# Patient Record
Sex: MALE | Race: WHITE | Employment: OTHER | URBAN - NONMETROPOLITAN AREA
[De-identification: names, ages, dates, MRNs, and addresses within clinical notes are randomized per-mention and may not be internally consistent; named-entity substitution may affect disease eponyms.]

---

## 2017-02-28 ENCOUNTER — OFFICE VISIT (OUTPATIENT)
Dept: CARDIOLOGY | Age: 72
End: 2017-02-28
Payer: MEDICARE

## 2017-02-28 VITALS
DIASTOLIC BLOOD PRESSURE: 74 MMHG | SYSTOLIC BLOOD PRESSURE: 134 MMHG | HEIGHT: 73 IN | HEART RATE: 68 BPM | BODY MASS INDEX: 31.14 KG/M2 | WEIGHT: 235 LBS

## 2017-02-28 DIAGNOSIS — I48.0 PAF (PAROXYSMAL ATRIAL FIBRILLATION) (HCC): ICD-10-CM

## 2017-02-28 DIAGNOSIS — R55 NEAR SYNCOPE: ICD-10-CM

## 2017-02-28 DIAGNOSIS — R07.9 CHEST PAIN, UNSPECIFIED TYPE: ICD-10-CM

## 2017-02-28 DIAGNOSIS — I49.5 SICK SINUS SYNDROME (HCC): ICD-10-CM

## 2017-02-28 DIAGNOSIS — I10 ESSENTIAL HYPERTENSION: Primary | ICD-10-CM

## 2017-02-28 DIAGNOSIS — Z95.0 PACEMAKER: ICD-10-CM

## 2017-02-28 PROCEDURE — G8419 CALC BMI OUT NRM PARAM NOF/U: HCPCS | Performed by: INTERNAL MEDICINE

## 2017-02-28 PROCEDURE — 99212 OFFICE O/P EST SF 10 MIN: CPT | Performed by: INTERNAL MEDICINE

## 2017-02-28 PROCEDURE — 1036F TOBACCO NON-USER: CPT | Performed by: INTERNAL MEDICINE

## 2017-02-28 PROCEDURE — 93280 PM DEVICE PROGR EVAL DUAL: CPT | Performed by: INTERNAL MEDICINE

## 2017-02-28 PROCEDURE — 3017F COLORECTAL CA SCREEN DOC REV: CPT | Performed by: INTERNAL MEDICINE

## 2017-02-28 PROCEDURE — G8598 ASA/ANTIPLAT THER USED: HCPCS | Performed by: INTERNAL MEDICINE

## 2017-02-28 PROCEDURE — 1123F ACP DISCUSS/DSCN MKR DOCD: CPT | Performed by: INTERNAL MEDICINE

## 2017-02-28 PROCEDURE — G8427 DOCREV CUR MEDS BY ELIG CLIN: HCPCS | Performed by: INTERNAL MEDICINE

## 2017-02-28 PROCEDURE — 4040F PNEUMOC VAC/ADMIN/RCVD: CPT | Performed by: INTERNAL MEDICINE

## 2017-02-28 PROCEDURE — G8484 FLU IMMUNIZE NO ADMIN: HCPCS | Performed by: INTERNAL MEDICINE

## 2017-03-07 ENCOUNTER — HOSPITAL ENCOUNTER (OUTPATIENT)
Dept: NON INVASIVE DIAGNOSTICS | Age: 72
Discharge: HOME | End: 2017-03-07
Payer: MEDICARE

## 2017-03-07 ENCOUNTER — HOSPITAL ENCOUNTER (OUTPATIENT)
Dept: NUCLEAR MEDICINE | Age: 72
Discharge: HOME | End: 2017-03-07
Payer: MEDICARE

## 2017-03-07 DIAGNOSIS — R07.9 CHEST PAIN, UNSPECIFIED TYPE: ICD-10-CM

## 2017-03-07 PROCEDURE — 6360000002 HC RX W HCPCS: Performed by: INTERNAL MEDICINE

## 2017-03-07 PROCEDURE — 93017 CV STRESS TEST TRACING ONLY: CPT

## 2017-03-07 PROCEDURE — 3430000000 HC RX DIAGNOSTIC RADIOPHARMACEUTICAL: Performed by: INTERNAL MEDICINE

## 2017-03-07 PROCEDURE — 78452 HT MUSCLE IMAGE SPECT MULT: CPT

## 2017-03-07 PROCEDURE — A9500 TC99M SESTAMIBI: HCPCS | Performed by: INTERNAL MEDICINE

## 2017-03-07 RX ADMIN — TETRAKIS(2-METHOXYISOBUTYLISOCYANIDE)COPPER(I) TETRAFLUOROBORATE 10 MILLICURIE: 1 INJECTION, POWDER, LYOPHILIZED, FOR SOLUTION INTRAVENOUS at 10:00

## 2017-03-07 RX ADMIN — TETRAKIS(2-METHOXYISOBUTYLISOCYANIDE)COPPER(I) TETRAFLUOROBORATE 30 MILLICURIE: 1 INJECTION, POWDER, LYOPHILIZED, FOR SOLUTION INTRAVENOUS at 10:00

## 2017-03-07 RX ADMIN — REGADENOSON 0.4 MG: 0.08 INJECTION, SOLUTION INTRAVENOUS at 10:00

## 2017-03-21 ENCOUNTER — OFFICE VISIT (OUTPATIENT)
Dept: CARDIOLOGY | Age: 72
End: 2017-03-21
Payer: MEDICARE

## 2017-03-21 VITALS
DIASTOLIC BLOOD PRESSURE: 88 MMHG | HEART RATE: 74 BPM | BODY MASS INDEX: 30.16 KG/M2 | HEIGHT: 74 IN | WEIGHT: 235 LBS | SYSTOLIC BLOOD PRESSURE: 138 MMHG

## 2017-03-21 DIAGNOSIS — I10 ESSENTIAL HYPERTENSION: Primary | ICD-10-CM

## 2017-03-21 DIAGNOSIS — R55 NEAR SYNCOPE: ICD-10-CM

## 2017-03-21 DIAGNOSIS — I48.0 PAF (PAROXYSMAL ATRIAL FIBRILLATION) (HCC): ICD-10-CM

## 2017-03-21 DIAGNOSIS — I49.5 SICK SINUS SYNDROME (HCC): ICD-10-CM

## 2017-03-21 PROCEDURE — G8484 FLU IMMUNIZE NO ADMIN: HCPCS | Performed by: INTERNAL MEDICINE

## 2017-03-21 PROCEDURE — 4040F PNEUMOC VAC/ADMIN/RCVD: CPT | Performed by: INTERNAL MEDICINE

## 2017-03-21 PROCEDURE — G8417 CALC BMI ABV UP PARAM F/U: HCPCS | Performed by: INTERNAL MEDICINE

## 2017-03-21 PROCEDURE — G8427 DOCREV CUR MEDS BY ELIG CLIN: HCPCS | Performed by: INTERNAL MEDICINE

## 2017-03-21 PROCEDURE — G8598 ASA/ANTIPLAT THER USED: HCPCS | Performed by: INTERNAL MEDICINE

## 2017-03-21 PROCEDURE — 3017F COLORECTAL CA SCREEN DOC REV: CPT | Performed by: INTERNAL MEDICINE

## 2017-03-21 PROCEDURE — 1123F ACP DISCUSS/DSCN MKR DOCD: CPT | Performed by: INTERNAL MEDICINE

## 2017-03-21 PROCEDURE — 99212 OFFICE O/P EST SF 10 MIN: CPT | Performed by: INTERNAL MEDICINE

## 2017-03-21 PROCEDURE — 1036F TOBACCO NON-USER: CPT | Performed by: INTERNAL MEDICINE

## 2017-05-31 ENCOUNTER — TELEPHONE (OUTPATIENT)
Dept: CARDIOLOGY | Age: 72
End: 2017-05-31

## 2017-05-31 PROCEDURE — 93294 REM INTERROG EVL PM/LDLS PM: CPT | Performed by: CLINICAL NURSE SPECIALIST

## 2017-05-31 PROCEDURE — 93296 REM INTERROG EVL PM/IDS: CPT | Performed by: CLINICAL NURSE SPECIALIST

## 2017-06-01 DIAGNOSIS — I49.5 SICK SINUS SYNDROME (HCC): ICD-10-CM

## 2017-06-01 DIAGNOSIS — Z95.0 PACEMAKER: Primary | ICD-10-CM

## 2017-07-10 DIAGNOSIS — Z95.0 PACEMAKER: ICD-10-CM

## 2017-07-10 DIAGNOSIS — I10 ESSENTIAL HYPERTENSION: ICD-10-CM

## 2017-07-10 DIAGNOSIS — I48.0 PAF (PAROXYSMAL ATRIAL FIBRILLATION) (HCC): ICD-10-CM

## 2017-07-10 RX ORDER — PROPAFENONE HYDROCHLORIDE 150 MG/1
TABLET, FILM COATED ORAL
Qty: 270 TABLET | Refills: 3 | Status: SHIPPED | OUTPATIENT
Start: 2017-07-10 | End: 2018-07-09 | Stop reason: SDUPTHER

## 2017-07-10 RX ORDER — LOSARTAN POTASSIUM 100 MG/1
TABLET ORAL
Qty: 90 TABLET | Refills: 3 | Status: SHIPPED | OUTPATIENT
Start: 2017-07-10 | End: 2018-10-23 | Stop reason: DRUGHIGH

## 2017-09-21 ENCOUNTER — OFFICE VISIT (OUTPATIENT)
Dept: CARDIOLOGY | Age: 72
End: 2017-09-21
Payer: MEDICARE

## 2017-09-21 VITALS
SYSTOLIC BLOOD PRESSURE: 136 MMHG | WEIGHT: 230 LBS | DIASTOLIC BLOOD PRESSURE: 78 MMHG | HEIGHT: 74 IN | HEART RATE: 68 BPM | BODY MASS INDEX: 29.52 KG/M2

## 2017-09-21 DIAGNOSIS — I25.10 CORONARY ARTERY DISEASE INVOLVING NATIVE CORONARY ARTERY OF NATIVE HEART WITHOUT ANGINA PECTORIS: Primary | ICD-10-CM

## 2017-09-21 DIAGNOSIS — I49.5 SICK SINUS SYNDROME (HCC): ICD-10-CM

## 2017-09-21 DIAGNOSIS — Z95.0 PACEMAKER: ICD-10-CM

## 2017-09-21 PROCEDURE — G8427 DOCREV CUR MEDS BY ELIG CLIN: HCPCS | Performed by: CLINICAL NURSE SPECIALIST

## 2017-09-21 PROCEDURE — G8417 CALC BMI ABV UP PARAM F/U: HCPCS | Performed by: CLINICAL NURSE SPECIALIST

## 2017-09-21 PROCEDURE — 1036F TOBACCO NON-USER: CPT | Performed by: CLINICAL NURSE SPECIALIST

## 2017-09-21 PROCEDURE — G8598 ASA/ANTIPLAT THER USED: HCPCS | Performed by: CLINICAL NURSE SPECIALIST

## 2017-09-21 PROCEDURE — 93280 PM DEVICE PROGR EVAL DUAL: CPT | Performed by: CLINICAL NURSE SPECIALIST

## 2017-09-21 PROCEDURE — 3017F COLORECTAL CA SCREEN DOC REV: CPT | Performed by: CLINICAL NURSE SPECIALIST

## 2017-09-21 PROCEDURE — 1123F ACP DISCUSS/DSCN MKR DOCD: CPT | Performed by: CLINICAL NURSE SPECIALIST

## 2017-09-21 PROCEDURE — 4040F PNEUMOC VAC/ADMIN/RCVD: CPT | Performed by: CLINICAL NURSE SPECIALIST

## 2017-09-21 PROCEDURE — 99213 OFFICE O/P EST LOW 20 MIN: CPT | Performed by: CLINICAL NURSE SPECIALIST

## 2017-12-21 ENCOUNTER — TELEPHONE (OUTPATIENT)
Dept: CARDIOLOGY | Age: 72
End: 2017-12-21

## 2017-12-22 DIAGNOSIS — Z95.0 PACEMAKER: Primary | ICD-10-CM

## 2017-12-22 DIAGNOSIS — I49.5 SICK SINUS SYNDROME (HCC): ICD-10-CM

## 2017-12-22 PROCEDURE — 93294 REM INTERROG EVL PM/LDLS PM: CPT | Performed by: INTERNAL MEDICINE

## 2017-12-22 PROCEDURE — 93296 REM INTERROG EVL PM/IDS: CPT | Performed by: INTERNAL MEDICINE

## 2018-03-29 ENCOUNTER — OFFICE VISIT (OUTPATIENT)
Dept: CARDIOLOGY | Age: 73
End: 2018-03-29
Payer: MEDICARE

## 2018-03-29 VITALS
HEIGHT: 74 IN | WEIGHT: 243 LBS | HEART RATE: 82 BPM | BODY MASS INDEX: 31.18 KG/M2 | DIASTOLIC BLOOD PRESSURE: 70 MMHG | SYSTOLIC BLOOD PRESSURE: 124 MMHG

## 2018-03-29 DIAGNOSIS — I49.5 SICK SINUS SYNDROME (HCC): ICD-10-CM

## 2018-03-29 DIAGNOSIS — Z95.0 PACEMAKER: ICD-10-CM

## 2018-03-29 DIAGNOSIS — I48.0 PAF (PAROXYSMAL ATRIAL FIBRILLATION) (HCC): Primary | ICD-10-CM

## 2018-03-29 DIAGNOSIS — I25.10 CORONARY ARTERY DISEASE INVOLVING NATIVE CORONARY ARTERY OF NATIVE HEART WITHOUT ANGINA PECTORIS: ICD-10-CM

## 2018-03-29 PROCEDURE — G8598 ASA/ANTIPLAT THER USED: HCPCS | Performed by: CLINICAL NURSE SPECIALIST

## 2018-03-29 PROCEDURE — G8417 CALC BMI ABV UP PARAM F/U: HCPCS | Performed by: CLINICAL NURSE SPECIALIST

## 2018-03-29 PROCEDURE — 93280 PM DEVICE PROGR EVAL DUAL: CPT | Performed by: CLINICAL NURSE SPECIALIST

## 2018-03-29 PROCEDURE — 3017F COLORECTAL CA SCREEN DOC REV: CPT | Performed by: CLINICAL NURSE SPECIALIST

## 2018-03-29 PROCEDURE — 1036F TOBACCO NON-USER: CPT | Performed by: CLINICAL NURSE SPECIALIST

## 2018-03-29 PROCEDURE — G8484 FLU IMMUNIZE NO ADMIN: HCPCS | Performed by: CLINICAL NURSE SPECIALIST

## 2018-03-29 PROCEDURE — G8427 DOCREV CUR MEDS BY ELIG CLIN: HCPCS | Performed by: CLINICAL NURSE SPECIALIST

## 2018-03-29 PROCEDURE — 99213 OFFICE O/P EST LOW 20 MIN: CPT | Performed by: CLINICAL NURSE SPECIALIST

## 2018-03-29 PROCEDURE — 1123F ACP DISCUSS/DSCN MKR DOCD: CPT | Performed by: CLINICAL NURSE SPECIALIST

## 2018-03-29 PROCEDURE — 4040F PNEUMOC VAC/ADMIN/RCVD: CPT | Performed by: CLINICAL NURSE SPECIALIST

## 2018-03-29 NOTE — PROGRESS NOTES
[facility] [address]  Phone: [phone]  Fax: [phone]    OFFICE VISIT:  3/29/2018    Sandra Clemons - : 1945    Reason For Visit:  Kimmy Regan is a 67 y.o. male who is here for 1 Year Follow Up (no cardiac symptoms) and Coronary Artery Disease  Casandra scan negative for ischemia 2017    Subjective  Kimmy Regan states he occasionally will have some chest tightness when he 1st begins walking. This is relieved after a few minutes. He denies exertional chest pain, shortness of breath, orthopnea, paroxysmal nocturnal dyspnea, syncope, presyncope, arrhythmia, edema and fatigue. The patient denies numbness or weakness to suggest cerebrovascular accident or transient ischemic attack. Joya Pemberton MD is PCP and follows labs including lipids.   Sandra Clemons has the following history as recorded in [facility]:    Patient Active Problem List    Diagnosis Date Noted    History of colon polyps 03/10/2016    Screening for colon cancer 03/10/2016    Heme positive stool 2014    Rectal bleeding 2014    CAD (coronary artery disease) 2012    PAF (paroxysmal atrial fibrillation) ([address] 10/30/2012    Hypertension     Pacemaker 2012    Sick sinus syndrome ([address] 2012    Near syncope 06/10/2012     Past Medical History:   Diagnosis Date    CAD (coronary artery disease) 2012    Hyperlipidemia     Hypertension     Near syncope 6/10/2012    Pacemaker 12    PAF (paroxysmal atrial fibrillation) ([address] [address] 10/30/2012     Past Surgical History:   Procedure Laterality Date    APPENDECTOMY      CARDIAC CATHETERIZATION  12   JDT    EF  60%  with angioplasty    CHOLECYSTECTOMY, LAPAROSCOPIC  11/2/15    COLONOSCOPY  2015    Nancy: mult polyps, 5 were tubular adenomas    COLONOSCOPY N/A 3/10/2016    Dr Don Rivas AP x 3, dysplasia (-), HP x 1, 3 yr recall    FRACTURE SURGERY      Nose    PACEMAKER PLACEMENT       Family History   Problem Relation Age of Onset    Heart Failure Mother       at age 80    Heart Disease Maternal Uncle     Colon Cancer Neg Hx     Colon Polyps Neg Hx     Esophageal Cancer Neg Hx     Liver Disease Neg Hx     Liver Cancer Neg Hx     Rectal Cancer Neg Hx     Stomach Cancer Neg Hx      Social History   Substance Use Topics    Smoking status: Former Smoker     Packs/day: 0.00     Types: Cigarettes    Smokeless tobacco: Never Used    Alcohol use No      Current Outpatient Prescriptions   Medication Sig Dispense Refill    losartan (COZAAR) 100 MG tablet Take 1 tablet by mouth  daily 90 tablet 3    propafenone (RYTHMOL) 150 MG tablet Take 1 tablet by mouth  every 8 hours 270 tablet 3    metoprolol tartrate (LOPRESSOR) 25 MG tablet Take one-half tablet by  mouth twice a day 90 tablet 3    pantoprazole (PROTONIX) 40 MG tablet Take 40 mg by mouth daily       aspirin 81 MG tablet Take 81 mg by mouth daily. No current facility-administered medications for this visit. Allergies: Lisinopril    Review of Systems  Constitutional  no significant activity change, appetite change, or unexpected weight change. No fever, chills or diaphoresis. No fatigue. HEENT  no significant rhinorrhea or epistaxis. No tinnitus or significant hearing loss. Eyes  no sudden vision change or amaurosis. Respiratory  no significant wheezing, stridor, apnea or cough. No dyspnea on exertion or shortness of breath. Cardiovascular  no exertional chest pain, orthopnea or PND. No sensation of arrhythmia or slow heart rate. No claudication or leg edema. Gastrointestinal  no abdominal swelling or pain. No blood in stool. No severe constipation, diarrhea, nausea, or vomiting. Genitourinary  no difficulty urinating, dysuria, frequency, or urgency. No flank pain or hematuria. Musculoskeletal  no back pain, gait disturbance, or myalgia. Skin  no color change or rash.   No

## 2018-06-29 DIAGNOSIS — I49.5 SICK SINUS SYNDROME (HCC): ICD-10-CM

## 2018-06-29 DIAGNOSIS — Z95.0 PACEMAKER: Primary | ICD-10-CM

## 2018-06-29 PROCEDURE — 93296 REM INTERROG EVL PM/IDS: CPT | Performed by: INTERNAL MEDICINE

## 2018-06-29 PROCEDURE — 93294 REM INTERROG EVL PM/LDLS PM: CPT | Performed by: INTERNAL MEDICINE

## 2018-07-02 ENCOUNTER — CLINICAL DOCUMENTATION (OUTPATIENT)
Dept: CARDIOLOGY | Age: 73
End: 2018-07-02

## 2018-07-09 DIAGNOSIS — Z95.0 PACEMAKER: ICD-10-CM

## 2018-07-09 DIAGNOSIS — I48.0 PAF (PAROXYSMAL ATRIAL FIBRILLATION) (HCC): ICD-10-CM

## 2018-07-09 DIAGNOSIS — I10 ESSENTIAL HYPERTENSION: ICD-10-CM

## 2018-07-09 RX ORDER — PROPAFENONE HYDROCHLORIDE 150 MG/1
TABLET, FILM COATED ORAL
Qty: 270 TABLET | Refills: 3 | Status: SHIPPED | OUTPATIENT
Start: 2018-07-09 | End: 2019-08-29 | Stop reason: SDUPTHER

## 2018-07-10 ENCOUNTER — OFFICE VISIT (OUTPATIENT)
Dept: CARDIOLOGY | Age: 73
End: 2018-07-10
Payer: MEDICARE

## 2018-07-10 ENCOUNTER — HOSPITAL ENCOUNTER (OUTPATIENT)
Dept: GENERAL RADIOLOGY | Age: 73
Discharge: HOME | End: 2018-07-10
Payer: MEDICARE

## 2018-07-10 VITALS
HEIGHT: 74 IN | DIASTOLIC BLOOD PRESSURE: 78 MMHG | HEART RATE: 74 BPM | SYSTOLIC BLOOD PRESSURE: 134 MMHG | WEIGHT: 225 LBS | BODY MASS INDEX: 28.88 KG/M2

## 2018-07-10 DIAGNOSIS — Z95.0 PACEMAKER: ICD-10-CM

## 2018-07-10 DIAGNOSIS — R06.02 SOB (SHORTNESS OF BREATH): Primary | ICD-10-CM

## 2018-07-10 DIAGNOSIS — R42 DIZZINESS: ICD-10-CM

## 2018-07-10 DIAGNOSIS — R06.02 SOB (SHORTNESS OF BREATH): ICD-10-CM

## 2018-07-10 DIAGNOSIS — I25.10 CORONARY ARTERY DISEASE INVOLVING NATIVE CORONARY ARTERY OF NATIVE HEART WITHOUT ANGINA PECTORIS: ICD-10-CM

## 2018-07-10 DIAGNOSIS — I49.5 SICK SINUS SYNDROME (HCC): ICD-10-CM

## 2018-07-10 DIAGNOSIS — I48.0 PAF (PAROXYSMAL ATRIAL FIBRILLATION) (HCC): ICD-10-CM

## 2018-07-10 LAB
ANION GAP SERPL CALCULATED.3IONS-SCNC: 11 MMOL/L (ref 7–19)
BUN BLDV-MCNC: 22 MG/DL (ref 8–23)
CALCIUM SERPL-MCNC: 9.6 MG/DL (ref 8.8–10.2)
CHLORIDE BLD-SCNC: 103 MMOL/L (ref 98–111)
CO2: 27 MMOL/L (ref 22–29)
CREAT SERPL-MCNC: 1.1 MG/DL (ref 0.5–1.2)
GFR NON-AFRICAN AMERICAN: >60
GLUCOSE BLD-MCNC: 85 MG/DL (ref 74–109)
HCT VFR BLD CALC: 44.9 % (ref 42–52)
HEMOGLOBIN: 14.4 G/DL (ref 14–18)
MCH RBC QN AUTO: 27.9 PG (ref 27–31)
MCHC RBC AUTO-ENTMCNC: 32.1 G/DL (ref 33–37)
MCV RBC AUTO: 86.8 FL (ref 80–94)
PDW BLD-RTO: 14.6 % (ref 11.5–14.5)
PLATELET # BLD: 206 K/UL (ref 130–400)
PMV BLD AUTO: 11.6 FL (ref 9.4–12.4)
POTASSIUM SERPL-SCNC: 4.3 MMOL/L (ref 3.5–5)
RBC # BLD: 5.17 M/UL (ref 4.7–6.1)
SODIUM BLD-SCNC: 141 MMOL/L (ref 136–145)
WBC # BLD: 7.4 K/UL (ref 4.8–10.8)

## 2018-07-10 PROCEDURE — 3017F COLORECTAL CA SCREEN DOC REV: CPT | Performed by: NURSE PRACTITIONER

## 2018-07-10 PROCEDURE — G8427 DOCREV CUR MEDS BY ELIG CLIN: HCPCS | Performed by: NURSE PRACTITIONER

## 2018-07-10 PROCEDURE — G8417 CALC BMI ABV UP PARAM F/U: HCPCS | Performed by: NURSE PRACTITIONER

## 2018-07-10 PROCEDURE — 1123F ACP DISCUSS/DSCN MKR DOCD: CPT | Performed by: NURSE PRACTITIONER

## 2018-07-10 PROCEDURE — 4040F PNEUMOC VAC/ADMIN/RCVD: CPT | Performed by: NURSE PRACTITIONER

## 2018-07-10 PROCEDURE — 71046 X-RAY EXAM CHEST 2 VIEWS: CPT

## 2018-07-10 PROCEDURE — 1036F TOBACCO NON-USER: CPT | Performed by: NURSE PRACTITIONER

## 2018-07-10 PROCEDURE — 1101F PT FALLS ASSESS-DOCD LE1/YR: CPT | Performed by: NURSE PRACTITIONER

## 2018-07-10 PROCEDURE — G8598 ASA/ANTIPLAT THER USED: HCPCS | Performed by: NURSE PRACTITIONER

## 2018-07-10 PROCEDURE — 99212 OFFICE O/P EST SF 10 MIN: CPT | Performed by: NURSE PRACTITIONER

## 2018-07-10 PROCEDURE — 93280 PM DEVICE PROGR EVAL DUAL: CPT | Performed by: NURSE PRACTITIONER

## 2018-07-10 NOTE — PATIENT INSTRUCTIONS
Try taking the losartan 100 mg 1/2 tab twice daily to see if that helps decrease dizziness. Continue other current medications as prescribed. Continue to follow up with primary care provider for non cardiac medical problems. Call the office with any problems, questions or concerns at [phone]. Follow up as scheduled with your cardiologist - as scheduled after cardiac catheterization. The following educational material has been included in this after visit summary for your review: heart health.     Additional instructions:  Go to first floor outpatient registration today for lab and chest x-ray. Coronary artery disease risk factors you can control: Smoking, high blood pressure, high cholesterol, diabetes, being overweight, lack of exercise and stress. Continue heart healthy diet. Take medications as directed. Exercise as tolerated. Strive for 15 minutes of exercise most days of the week. If asked to keep a blood pressure log, do so for 2 weeks. Call the office to report readings at [phone]. Blood pressure goal is 140/90 or less. If you are a diabetic, the goal is 130/80 or less. If you are taking cholesterol lowering medications, it is recommended that lab work be checked annually. Always keep a current medication list. Bring your medications to every office visit.      Echocardiogram -  No prep. [place] at the [address] [place] [address] and [address] [facility] located on the first floor of [place] [address] through hospital main entrance and turn immediately to your left. Date/Time:   Takes approximately 30 min. An echocardiogram uses sound waves to produce images of your heart. This commonly used test allows your doctor to see how your heart is beating and pumping blood. Your doctor can use the images from an echocardiogram to identify various abnormalities in the heart muscle and valves.   This test has 2 parts:   Ø You will be asked to disrobe from the waist up and given a gown to x-ray machine to assess the heart and its blood supply. Reasons for Procedure   It is used to find the cause of symptoms, like chest pain, that could mean heart problems. Cardiac catheterization helps doctors to: Identify narrowed or clogged arteries of the heart   Measure blood pressure within the heart   Evaluate how well the heart valves and chambers function   Check heart defects   Evaluate an enlarged heart   Decide on an appropriate treatment   Possible Complications   If you are planning to have a cardiac catheterization, your doctor will review a list of possible complications, which may include:   Bleeding at the point of the catheter insertion   Damage to arteries   Heart attack or arrhythmia (abnormal heart beats)   Allergic reaction to x-ray dye   Blood clot formation   Infection   Some factors that may increase the risk of complications include: Allergies to medicines or x-ray dye   Obesity   Smoking   Bleeding disorder   Age: 61 or older   Recent pneumonia   Recent heart attack   Diabetes   Kidney disease   What to Expect Prior to Procedure   Your doctor may order:   Blood and urine tests   Electrocardiogram (ECG, EKG)a test that records the heart's activity by measuring electrical currents through the heart muscle   Chest x-ray   Stress test   Talk to your doctor about your medicines. You may be asked to stop taking some medicines before the procedure, like:   Anti-inflammatory drugs (eg, ibuprofen )   Blood thinners, like or warfarin (Coumadin)   clopidogrel (Plavix)   Metformin (Glucophage) or glyburide and metformin (Glucovance)   Leading up to your procedure:   Arrange for a ride to and from the procedure. The night before, do not eat or drink anything after midnight. Anesthesia   Local anesthesia will be used at the insertion site. A mild sedative may be given one hour before or through IV during the procedure. This will help you relax.      Description of the Procedure   During the

## 2018-07-12 ENCOUNTER — HOSPITAL ENCOUNTER (OUTPATIENT)
Dept: NON INVASIVE DIAGNOSTICS | Age: 73
Discharge: HOME | End: 2018-07-12
Payer: MEDICARE

## 2018-07-12 LAB
LV EF: 55 %
LVEF MODALITY: NORMAL

## 2018-07-12 PROCEDURE — 93306 TTE W/DOPPLER COMPLETE: CPT

## 2018-07-12 PROCEDURE — 93306 TTE W/DOPPLER COMPLETE: CPT | Performed by: INTERNAL MEDICINE

## 2018-07-14 PROBLEM — R06.02 SOB (SHORTNESS OF BREATH): Status: ACTIVE | Noted: 2018-07-14

## 2018-07-14 PROBLEM — R42 DIZZINESS: Status: ACTIVE | Noted: 2018-07-14

## 2018-07-14 NOTE — PROGRESS NOTES
vision change or amaurosis. No corneal arcus, xantholasma, subconjunctival hemorrhage or discharge. Respiratory  no significant wheezing, stridor, apnea or cough. + severe RITTER over the past 3 months. Cardiovascular  no exertional chest pain to suggest myocardial ischemia. No orthopnea or PND. No sensation of sustained arrythmia. No occurrence of slow heart rate. No palpitations. No claudication. No leg edema. Gastrointestinal  no abdominal swelling or pain. No blood in stool. No severe constipation, diarrhea, nausea, or vomiting. Genitourinary  no dysuria, frequency, or urgency. No flank pain or hematuria. Musculoskeletal  no back pain or myalgia. No problems with gait. Extremities - no clubbing, cyanosis or edema. Skin  no color change or rash. No pallor. No new surgical incision. Neurologic  no speech difficulty, facial asymmetry or lateralizing weakness. No seizures, presyncope or syncope.  + dizziness with associated SOA. Hematologic  no easy bruising or excessive bleeding. Psychiatric  no severe anxiety or insomnia. No confusion. All other review of systems are negative. Objective  Vital Signs - /78   Pulse 74   Ht 6' 2\" (1.88 m)   Wt 225 lb (102.1 kg)   BMI 28.89 kg/m²   General - [place] is alert, cooperative, and pleasant. Well groomed. No acute distress. Body habitus - Body mass index is 28.89 kg/m². HEENT  Head is normocephalic. No circumoral cyanosis. Dentition is normal.  EYES -   Lids normal without ptosis. No discharge, edema or subconjunctival hemorrhage. Neck - Symmetrical without apparent mass or lymphadenopathy. Respiratory - Normal respiratory effort without use of accessory muscles. Ausculatation reveals vesicular breath sounds without crackles, wheezes, rub or rhonchi. Cardiovascular  No jugular venous distention. Auscultation reveals regular rate and rhythm. No audible clicks, gallop or rub. No murmur.   No lower extremity varicosities. No carotid bruits. Abdominal -  No visible distention, mass or pulsations. Extremities - No clubbing or cyanosis. No statis dermatitis or ulcers. No edema. Musculoskeletal -   No Osler's nodes. No kyphosis or scoliosis. Gait is even and regular without limp or shuffle. Ambulates without assistance. Skin -  Warm and dry; no rash or pallor. No new surgical wound. Neurological - No focal neurological deficits. Thought processes coherent. No apparent tremor. Oriented to person, place and time. Psychiatric -  Appropriate affect and mood. Assessment:     Diagnosis Orders   1. SOB (shortness of breath)  CBC    Basic Metabolic Panel    XR CHEST STANDARD (2 VW)    Echo complete   2. Dizziness  Echo complete   3. Coronary artery disease involving native coronary artery of native heart without angina pectoris  CBC    Basic Metabolic Panel    XR CHEST STANDARD (2 VW)    Echo complete   4. Pacemaker     5. PAF (paroxysmal atrial fibrillation) ([facility]     6. Sick sinus syndrome [facility])       Pacemaker check showed adequate battery status @ 2.93 V. Mode: AAIR-DDDR. [facility] Lead impedances are stable. Pacing:  AP-VS 96.3%. Reprogramming for sensitivity and threshold testing. Appropriate diagnostics and safety margins noted. Sustained arrythmia:  none. See device chart for in office interrogation results. Next Carelink remote transmission:  3 months. 3/7/17 Lexiscan report reviewed:  Findings: The resting ejection fraction was calculated to be 47   percent. There was no evidence of infarct or reversible ischemia. Conclusions: Aman Aggarwal without evidence of infarct or reversible   ischemia. Dictated on 3/7/2017 3:03 PM EST. Signed by Dr Anna Jose on   3/7/2017 3:04 PM EST   Signed by Dr Leyda Paez 03/07/2017 14:04     6/9/12 carotid ultrasound report reviewed:       IMPRESSION        1.  Bilateral atherosclerotic plaquing causing less than a 50% stenosis        in each internal carotid artery.         2. Bilateral antegrade vertebral artery flow.         Clinical correlation suggested.         Voice Record ID: [id number]                        Reported By: Mira Deleon MD, RVT     12/6/12 cath report reviewed:  LMCA:  Diffuse irregularity  LAD:  Diffuse irregularlity  LCx:  Diffuse irregularlity  RCA: 30% mid stenosis  EF 60%    Severe RITTER with dizziness possibly anginal equivalent. Normal Lexiscan 3/2017. Cath 2012 diffuse coronary irregularities with 30% mid RCA stenosis. Recommended cardiac catheterization. Procedure and risks explained. Patient opted to proceed. L&C scheduled for 7/16/18 at 10:30 am.    No hx iodinated contrast or shellfish allergy. On metformin, will hold 2 days prior to cath. Prework up completed today. Schedule 2D echo prior to cath to assess cardiac structure. BP well controlled on current regimen. Pacemaker interrogation showed adequate battery status, diagnostics and safety margins. No AF on pacer log. Continues on Rythmol. Not currently on oral anticoagulation therapy.   CHADS-VASC 4    BP Readings from Last 3 Encounters:   07/10/18 134/78   03/29/18 124/70   09/21/17 136/78    Pulse Readings from Last 3 Encounters:   07/10/18 74   03/29/18 82   09/21/17 68        Wt Readings from Last 3 Encounters:   07/10/18 225 lb (102.1 kg)   03/29/18 243 lb (110.2 kg)   09/21/17 230 lb (104.3 kg)       Recent Results (from the past 1008 hour(s))   CBC    Collection Time: 07/10/18  3:04 PM   Result Value Ref Range    WBC 7.4 4.8 - 10.8 K/uL    RBC 5.17 4.70 - 6.10 M/uL    Hemoglobin 14.4 14.0 - 18.0 g/dL    Hematocrit 44.9 42.0 - 52.0 %    MCV 86.8 80.0 - 94.0 fL    MCH 27.9 27.0 - 31.0 pg    MCHC 32.1 (L) 33.0 - 37.0 g/dL    RDW 14.6 (H) 11.5 - 14.5 %    Platelets 873 903 - 602 K/uL    MPV 11.6 9.4 - 12.4 fL   Basic Metabolic Panel    Collection Time: 07/10/18  3:04 PM   Result Value Ref Range    Sodium 141 136 - 145 mmol/L    Potassium 4.3 3.5 - 5.0 mmol/L appointment, please contact [facility] Registration, [phone], as soon as possible to reschedule.      Edi Monsivaisite, APRN

## 2018-07-16 PROBLEM — R06.09 DOE (DYSPNEA ON EXERTION): Status: ACTIVE | Noted: 2018-07-14

## 2018-07-23 ENCOUNTER — TELEPHONE (OUTPATIENT)
Dept: CARDIOLOGY | Age: 73
End: 2018-07-23

## 2018-10-23 ENCOUNTER — OFFICE VISIT (OUTPATIENT)
Dept: CARDIOLOGY | Age: 73
End: 2018-10-23
Payer: MEDICARE

## 2018-10-23 VITALS
WEIGHT: 211 LBS | BODY MASS INDEX: 27.08 KG/M2 | HEIGHT: 74 IN | DIASTOLIC BLOOD PRESSURE: 68 MMHG | HEART RATE: 68 BPM | SYSTOLIC BLOOD PRESSURE: 106 MMHG

## 2018-10-23 DIAGNOSIS — I48.0 PAF (PAROXYSMAL ATRIAL FIBRILLATION) (HCC): ICD-10-CM

## 2018-10-23 DIAGNOSIS — Z95.0 PACEMAKER: ICD-10-CM

## 2018-10-23 DIAGNOSIS — I10 ESSENTIAL HYPERTENSION: ICD-10-CM

## 2018-10-23 DIAGNOSIS — I25.10 CORONARY ARTERY DISEASE INVOLVING NATIVE CORONARY ARTERY OF NATIVE HEART WITHOUT ANGINA PECTORIS: Primary | ICD-10-CM

## 2018-10-23 DIAGNOSIS — I49.5 SICK SINUS SYNDROME (HCC): ICD-10-CM

## 2018-10-23 DIAGNOSIS — I42.8 NON-ISCHEMIC CARDIOMYOPATHY (HCC): ICD-10-CM

## 2018-10-23 PROCEDURE — 1101F PT FALLS ASSESS-DOCD LE1/YR: CPT | Performed by: NURSE PRACTITIONER

## 2018-10-23 PROCEDURE — 93280 PM DEVICE PROGR EVAL DUAL: CPT | Performed by: NURSE PRACTITIONER

## 2018-10-23 PROCEDURE — 99214 OFFICE O/P EST MOD 30 MIN: CPT | Performed by: NURSE PRACTITIONER

## 2018-10-23 PROCEDURE — G8484 FLU IMMUNIZE NO ADMIN: HCPCS | Performed by: NURSE PRACTITIONER

## 2018-10-23 PROCEDURE — G8427 DOCREV CUR MEDS BY ELIG CLIN: HCPCS | Performed by: NURSE PRACTITIONER

## 2018-10-23 PROCEDURE — 3017F COLORECTAL CA SCREEN DOC REV: CPT | Performed by: NURSE PRACTITIONER

## 2018-10-23 PROCEDURE — G8417 CALC BMI ABV UP PARAM F/U: HCPCS | Performed by: NURSE PRACTITIONER

## 2018-10-23 RX ORDER — LOSARTAN POTASSIUM 100 MG/1
TABLET ORAL
COMMUNITY

## 2018-10-23 NOTE — PROGRESS NOTES
air.  Cardiovascular - no exertional chest pain to suggest myocardial ischemia. No orthopnea or PND. No sensation of sustained arrythmia. No occurrence of slow heart rate. No palpitations. No claudication. No leg edema. Gastrointestinal - no abdominal swelling or pain. No blood in stool. No severe constipation, diarrhea, nausea, or vomiting. Genitourinary - no dysuria, frequency, or urgency. No flank pain or hematuria. Musculoskeletal - no back pain or myalgia. No problems with gait. Extremities - no clubbing, cyanosis or edema. Skin - no color change or rash. No pallor. No new surgical incision. Neurologic - no speech difficulty, facial asymmetry or lateralizing weakness. No seizures, presyncope or syncope. No significant dizziness. Hematologic - no easy bruising or excessive bleeding. Psychiatric - no severe anxiety or insomnia. No confusion. All other review of systems are negative. Objective  Vital Signs - /68   Pulse 68   Ht 6' 2\" (1.88 m)   Wt 211 lb (95.7 kg)   BMI 27.09 kg/m²   General - [place] is alert, cooperative, and pleasant. Well groomed. No acute distress. Body habitus - Body mass index is 27.09 kg/m². HEENT - Head is normocephalic. No circumoral cyanosis. Dentition is normal.  EYES -   Lids normal without ptosis. No discharge, edema or subconjunctival hemorrhage. Neck - Symmetrical without apparent mass or lymphadenopathy. Respiratory - Normal respiratory effort without use of accessory muscles. Ausculatation reveals vesicular breath sounds without crackles, wheezes, rub or rhonchi. Cardiovascular - No jugular venous distention. Auscultation reveals regular rate and rhythm. No audible clicks, gallop or rub. No murmur. No lower extremity varicosities. No carotid bruits. Abdominal -  No visible distention, mass or pulsations. Extremities - No clubbing or cyanosis. No statis dermatitis or ulcers. No edema.     Musculoskeletal -   No

## 2019-01-23 DIAGNOSIS — Z95.0 PACEMAKER: Primary | ICD-10-CM

## 2019-01-23 DIAGNOSIS — I49.5 SICK SINUS SYNDROME (HCC): ICD-10-CM

## 2019-01-23 PROCEDURE — 93294 REM INTERROG EVL PM/LDLS PM: CPT | Performed by: NURSE PRACTITIONER

## 2019-01-23 PROCEDURE — 93296 REM INTERROG EVL PM/IDS: CPT | Performed by: NURSE PRACTITIONER

## 2019-04-03 ASSESSMENT — ENCOUNTER SYMPTOMS
CONSTIPATION: 0
ABDOMINAL PAIN: 0
CHEST TIGHTNESS: 0
ABDOMINAL DISTENTION: 0
BACK PAIN: 0
COUGH: 0
BLOOD IN STOOL: 0
NAUSEA: 0
RECTAL PAIN: 0
SORE THROAT: 0
SHORTNESS OF BREATH: 0
DIARRHEA: 0
VOICE CHANGE: 0
VOMITING: 0

## 2019-04-03 NOTE — PROGRESS NOTES
Subjective:      Patient ID: Von Hammond is a 75 y.o. male  Keagan Veronica MD  No ref. provider found    HPI  Chief Complaint   Patient presents with    Colon Cancer Screening    Other     history of colon polyps     Patient with history of adenomatous colon polyps due for screening colonoscopy. Last c-scope      The patient denies abdominal or flank pain, anorexia, nausea or vomiting, dysphagia, change in bowel habits or black or bloody stools or weight loss.         Family History   Problem Relation Age of Onset    Heart Failure Mother          at age 80    Heart Disease Maternal Uncle     Colon Cancer Neg Hx     Colon Polyps Neg Hx     Esophageal Cancer Neg Hx     Liver Disease Neg Hx     Liver Cancer Neg Hx     Rectal Cancer Neg Hx     Stomach Cancer Neg Hx        Past Medical History:   Diagnosis Date    CAD (coronary artery disease) 2012    Colon polyp     Diabetes mellitus ([facility]     GERD (gastroesophageal reflux disease)     Hyperlipidemia     Hypertension     Near syncope 6/10/2012    Pacemaker 12    PAF (paroxysmal atrial fibrillation) ([facility] 10/30/2012       Past Surgical History:   Procedure Laterality Date    APPENDECTOMY      CARDIAC CATHETERIZATION  12   JDT    EF  60%  with angioplasty    CHOLECYSTECTOMY, LAPAROSCOPIC  11/2/15    COLONOSCOPY  2015    Nancy: mult polyps, 5 were tubular adenomas    COLONOSCOPY N/A 3/10/2016    Dr Crispin Henry AP x 3, dysplasia (-), HP x 1, 3 yr recall    EYE SURGERY Bilateral     CATARACT    FRACTURE SURGERY      Nose    PACEMAKER PLACEMENT         Current Outpatient Medications   Medication Sig Dispense Refill    famotidine (PEPCID) 20 MG tablet Take 20 mg by mouth every evening      losartan (COZAAR) 100 MG tablet Take 0.5 tablet twice daily      metFORMIN (GLUCOPHAGE) 500 MG tablet Take 500 mg by mouth 2 times daily (with meals)      propafenone (RYTHMOL) 150 MG tablet TAKE 1 TABLET BY MOUTH  EVERY 8 HOURS 270 tablet 3    metoprolol tartrate (LOPRESSOR) 25 MG tablet TAKE ONE-HALF TABLET BY  MOUTH TWICE A DAY 90 tablet 3    aspirin 81 MG tablet Take 81 mg by mouth daily. No current facility-administered medications for this visit. Allergies   Allergen Reactions    Lisinopril      cough        reports that he has quit smoking. His smoking use included cigarettes. He smoked 0.00 packs per day. He has never used smokeless tobacco. He reports that he does not drink alcohol or use drugs. Review of Systems   Constitutional: Negative for appetite change, fever and unexpected weight change. HENT: Negative for sore throat and voice change. Respiratory: Negative for cough, chest tightness and shortness of breath. Cardiovascular: Negative for chest pain, palpitations and leg swelling. Gastrointestinal: Negative for abdominal distention, abdominal pain, blood in stool, constipation, diarrhea, nausea, rectal pain and vomiting. Musculoskeletal: Negative for arthralgias, back pain and gait problem. Skin: Negative for pallor, rash and wound. Neurological: Negative for dizziness, weakness and light-headedness. Hematological: Negative for adenopathy. Does not bruise/bleed easily. All other systems reviewed and are negative. Objective:   Physical Exam   Constitutional: He is oriented to person, place, and time. He appears well-developed and well-nourished. No distress. /86   Pulse 71   Ht 6' 2\" (1.88 m)   Wt 223 lb 9.6 oz (101.4 kg)   SpO2 98%   BMI 28.71 kg/m²      Eyes: Conjunctivae are normal. No scleral icterus. Neck: No tracheal deviation present. Cardiovascular: Normal rate and regular rhythm. Exam reveals no gallop and no friction rub. No murmur heard. Pulmonary/Chest: Effort normal and breath sounds normal. No respiratory distress. He has no wheezes. He has no rhonchi. He has no rales. Abdominal: Soft.  Normal appearance and bowel sounds are normal. He exhibits no distension and no mass. There is no hepatomegaly. There is no tenderness. There is no rebound and no guarding. Musculoskeletal: He exhibits no edema. Neurological: He is alert and oriented to person, place, and time. He has normal strength. Skin: Skin is warm, dry and intact. No cyanosis. No pallor. Psychiatric: He has a normal mood and affect. His behavior is normal. Thought content normal. Cognition and memory are normal.       Assessment:      1. History of adenomatous polyp of colon    2. Screening for colon cancer          Plan:      Schedule colonoscopy screening    Instruct on bowel prep. Nothing to eat or drink after midnight the day of the exam.  Unable to drive for 24 hours after the procedure. No aspirin or nonsteroidal anti-inflammatories for 5 days before procedure. I have discussed the benefits, alternatives, and risks (including bleeding, perforation and death)  for pursuing Endoscopy (EGD/Colonscopy/EUS/ERCP) with the patient and they are willing to continue. We also discussed the need for anesthesia, IV access, proper dietary changes, medication changes if necessary, and need for bowel prep (if ordered) prior to their Endoscopic procedure. They are aware they must have someone accompany them to their scheduled procedure to drive them home - they agree to the above and are willing to continue.      Plan for anesthesia: MAC  no reported complications

## 2019-04-04 ENCOUNTER — OFFICE VISIT (OUTPATIENT)
Dept: GASTROENTEROLOGY | Age: 74
End: 2019-04-04

## 2019-04-04 VITALS
DIASTOLIC BLOOD PRESSURE: 86 MMHG | OXYGEN SATURATION: 98 % | BODY MASS INDEX: 28.7 KG/M2 | HEART RATE: 71 BPM | SYSTOLIC BLOOD PRESSURE: 132 MMHG | WEIGHT: 223.6 LBS | HEIGHT: 74 IN

## 2019-04-04 DIAGNOSIS — Z86.010 HISTORY OF ADENOMATOUS POLYP OF COLON: Primary | ICD-10-CM

## 2019-04-04 DIAGNOSIS — Z12.11 SCREENING FOR COLON CANCER: ICD-10-CM

## 2019-04-04 PROCEDURE — 99999 PR OFFICE/OUTPT VISIT,PROCEDURE ONLY: CPT | Performed by: NURSE PRACTITIONER

## 2019-04-04 RX ORDER — FAMOTIDINE 20 MG/1
20 TABLET, FILM COATED ORAL EVERY EVENING
COMMUNITY

## 2019-04-04 NOTE — PATIENT INSTRUCTIONS
Schedule colonoscopy. No aspirin, ibuprofen, naproxen, fish oil or vitamin E for 5 days before procedure. Do not eat or drink after midnight the day of the procedure. Allowed medications can be taken with a small sip of water. Please review your prep instructions for allowed medications. You will not be able to drive for 24 hours after the procedure due to sedation. Bring a  with you the day of the procedure. If you are on blood thinners, clearance from the prescribing physician will be obtained before your procedure is scheduled. If it is determined it is not safe to hold these medications for a short time an alternative procedure for evaluation may be recommended. Risks of colonoscopy include, but are not limited to, perforation, bleeding, and infection, Risk of perforation and bleeding are increased if there is a polyp removed. Anesthesia risks will be reviewed with you before the procedure by a member of the anesthesia department. Your physician may also schedule a follow up appointment with the nurse practitioner to discuss pathology, symptoms or to check if you have had any problems related to your procedure. If you prefer not to return to the office after your procedure please discuss this with your physician on the day of your colonoscopy. The physician will talk with you and/or your family after the procedure is completed. Final recommendations are based on the pathologist report if biopsies or specimens are taken. For Colonoscopy: You will be given specific directions regarding restrictions to diet and bowel prep instructions including laxatives. Please read these instructions one week prior to your scheduled procedure to ensure that you are prepared. If you have any questions regarding these instructions please call our office Mon through Fri from 8:00 am to 4:00 pm.     Follow prep instructions provided for bowel prep. Take all of the bowel prep as directed.  If you are having problems with nausea, stop your prep for 30-45 min to allow the nausea to subside before resuming your prep. It is important to drink plenty of fluids throughout the day before taking your laxatives. This will help to protect your kidneys, prevent dehydration and maximize the effect of the bowel prep. If polyps are removed during the procedure they will be sent to a pathologist for analysis. Unless you have a follow up appointment scheduled, you will be notified by mail of the pathology results within 4 weeks. If you have not received results after 4 weeks you may call the office to obtain this information. Your diet before a colonoscopy bowel preparation is very important to ensure a successful colon exam. It is recommended to consider certain changes to your diet three to four days prior to the procedure. Remember that your bowels need to be empty for the exam.    What foods are good to eat? Cut down on heavy solid foods three to four days before the procedure and start introducing lighter meals to your diet. The following food suggestions are a good part of your diet before a colonoscopy bowel preparation. Light meat that is easily digestible such as chicken (without the skin)   Potatoes without skin   Cheese   Eggs   A light meal of steamed white fish   Light clear soups    Foods and drinks to avoid  Avoid foods that contain too much fiber. Stay clear of dark colored beverages. They can stick to the walls of the digestive tract and make it difficult to differentiate from blood. Some of these foods are:  Red meat, rice, nuts and vegetables   Milk, other milk based fluids and cream   Most fruit and puddings   Whole grain pasta   Cereals, bran and seeds   Colored beverages, especially those that are red or purple in color   Red colored Jell-O   On the day before the colonoscopy, continue to drink plenty of clear fluids.  It is important   to keep yourself hydrated before the exam. Please follow all instructions as provided for cleansing the bowel. Failure to have an adequately prepped colon may cause you to have incomplete exam with further testing required.      [email or web address]

## 2019-04-24 DIAGNOSIS — Z95.0 PACEMAKER: Primary | ICD-10-CM

## 2019-04-24 DIAGNOSIS — I49.5 SICK SINUS SYNDROME (HCC): ICD-10-CM

## 2019-04-24 PROCEDURE — 93294 REM INTERROG EVL PM/LDLS PM: CPT | Performed by: NURSE PRACTITIONER

## 2019-04-24 PROCEDURE — 93296 REM INTERROG EVL PM/IDS: CPT | Performed by: NURSE PRACTITIONER

## 2019-06-11 ENCOUNTER — OFFICE VISIT (OUTPATIENT)
Dept: CARDIOLOGY | Age: 74
End: 2019-06-11
Payer: MEDICARE

## 2019-06-11 VITALS
BODY MASS INDEX: 29 KG/M2 | DIASTOLIC BLOOD PRESSURE: 72 MMHG | SYSTOLIC BLOOD PRESSURE: 120 MMHG | WEIGHT: 226 LBS | HEIGHT: 74 IN

## 2019-06-11 DIAGNOSIS — I49.5 SICK SINUS SYNDROME (HCC): ICD-10-CM

## 2019-06-11 DIAGNOSIS — I48.0 PAF (PAROXYSMAL ATRIAL FIBRILLATION) (HCC): ICD-10-CM

## 2019-06-11 DIAGNOSIS — Z95.0 PACEMAKER: ICD-10-CM

## 2019-06-11 DIAGNOSIS — I25.10 CORONARY ARTERY DISEASE INVOLVING NATIVE CORONARY ARTERY OF NATIVE HEART WITHOUT ANGINA PECTORIS: Primary | ICD-10-CM

## 2019-06-11 DIAGNOSIS — I42.8 NON-ISCHEMIC CARDIOMYOPATHY (HCC): ICD-10-CM

## 2019-06-11 DIAGNOSIS — I10 ESSENTIAL HYPERTENSION: ICD-10-CM

## 2019-06-11 PROCEDURE — 1036F TOBACCO NON-USER: CPT | Performed by: NURSE PRACTITIONER

## 2019-06-11 PROCEDURE — G8417 CALC BMI ABV UP PARAM F/U: HCPCS | Performed by: NURSE PRACTITIONER

## 2019-06-11 PROCEDURE — 99213 OFFICE O/P EST LOW 20 MIN: CPT | Performed by: NURSE PRACTITIONER

## 2019-06-11 PROCEDURE — G8427 DOCREV CUR MEDS BY ELIG CLIN: HCPCS | Performed by: NURSE PRACTITIONER

## 2019-06-11 PROCEDURE — 1123F ACP DISCUSS/DSCN MKR DOCD: CPT | Performed by: NURSE PRACTITIONER

## 2019-06-11 PROCEDURE — G8598 ASA/ANTIPLAT THER USED: HCPCS | Performed by: NURSE PRACTITIONER

## 2019-06-11 PROCEDURE — 3017F COLORECTAL CA SCREEN DOC REV: CPT | Performed by: NURSE PRACTITIONER

## 2019-06-11 PROCEDURE — 4040F PNEUMOC VAC/ADMIN/RCVD: CPT | Performed by: NURSE PRACTITIONER

## 2019-06-11 NOTE — PROGRESS NOTES
[facility], [place]. [facility] [address] [place] [address], [facility] [address] [facility]  [phone] office  [phone] fax      OFFICE VISIT:  2019    Jovany Santana - : 1945    Reason For Visit:  Zachery Johnson is a 75 y.o. male who is here for 6 month follow up and pacer check. No cardiac problems reported. Patient followed for:  Coronary artery disease involving native coronary artery of native heart without angina pectoris    PAF (paroxysmal atrial fibrillation) (HCC)    Essential hypertension    Non-ischemic cardiomyopathy ([facility] [address]    Sick sinus syndrome [facility])    Pacemaker      The patient presents today for cardiology follow up. Overall, the patient is doing well from a cardiac standpoint without symptoms to suggest myocardial ischemia. Reports some RITTER which is stable. No problem with fluid retention, edema or orthopnea. BP is well controlled on current regimen. No recurrent AF on Rythmol. The patient's PCP monitors cholesterol. Subjective  Glendal denies exertional chest pain, orthopnea, paroxysmal nocturnal dyspnea, syncope, presyncope, sustained arrythmia, edema and fatigue. The patient denies numbness or weakness to suggest cerebrovascular accident or transient ischemic attack. + stable RITTER.     Jovany Santana has the following history as recorded in [facility]:    Patient Active Problem List   Diagnosis Code    Near syncope R55    Pacemaker Z95.0    Hypertension I10    PAF (paroxysmal atrial fibrillation) (HCC) I48.0    CAD (coronary artery disease) I25.10    Heme positive stool R19.5    Rectal bleeding K62.5    History of adenomatous polyp of colon Z86.010    Sick sinus syndrome (HCC) I49.5    Dizziness R42    RITTER (dyspnea on exertion) R06.09    Non-ischemic cardiomyopathy (HCC) I42.8     Past Medical History:   Diagnosis Date    CAD (coronary artery disease) 2012    Colon polyp     Diabetes mellitus (HCC)     GERD (gastroesophageal reflux disease)     Hyperlipidemia     Hypertension     Near syncope 6/10/2012    Pacemaker 12    PAF (paroxysmal atrial fibrillation) ([facility] [address] 10/30/2012     Past Surgical History:   Procedure Laterality Date    APPENDECTOMY      CARDIAC CATHETERIZATION  12   JDT    EF  60%  with angioplasty    CHOLECYSTECTOMY, LAPAROSCOPIC  11/2/15    COLONOSCOPY  2015    Nancy: mult polyps, 5 were tubular adenomas    COLONOSCOPY N/A 3/10/2016    Dr Rukhsana Wright AP x 3, dysplasia (-), HP x 1, 3 yr recall    EYE SURGERY Bilateral     CATARACT    FRACTURE SURGERY      Nose    PACEMAKER PLACEMENT       Family History   Problem Relation Age of Onset    Heart Failure Mother          at age 80    Heart Disease Maternal Uncle     Colon Cancer Neg Hx     Colon Polyps Neg Hx     Esophageal Cancer Neg Hx     Liver Disease Neg Hx     Liver Cancer Neg Hx     Rectal Cancer Neg Hx     Stomach Cancer Neg Hx      Social History     Tobacco Use    Smoking status: Former Smoker     Packs/day: 0.00     Types: Cigarettes    Smokeless tobacco: Never Used   Substance Use Topics    Alcohol use: No      Current Outpatient Medications   Medication Sig Dispense Refill    famotidine (PEPCID) 20 MG tablet Take 20 mg by mouth every evening      losartan (COZAAR) 100 MG tablet Take 0.5 tablet twice daily      metFORMIN (GLUCOPHAGE) 500 MG tablet Take 500 mg by mouth 2 times daily (with meals)      propafenone (RYTHMOL) 150 MG tablet TAKE 1 TABLET BY MOUTH  EVERY 8 HOURS 270 tablet 3    metoprolol tartrate (LOPRESSOR) 25 MG tablet TAKE ONE-HALF TABLET BY  MOUTH TWICE A DAY 90 tablet 3    aspirin 81 MG tablet Take 81 mg by mouth daily. No current facility-administered medications for this visit. Allergies: Lisinopril    Review of Systems  Constitutional - no appetite change, or unexpected weight change. No fever, chills or diaphoresis.   No significant change in activity level or new onset of fatigue. HEENT - no significant rhinorrhea or epistaxis. No tinnitus or significant hearing loss. Eyes - no sudden vision change or amaurosis. No corneal arcus, xantholasma, subconjunctival hemorrhage or discharge. Respiratory - no significant wheezing, stridor, apnea or cough. + stable RITTER. Cardiovascular - no exertional chest pain to suggest myocardial ischemia. No orthopnea or PND. No sensation of sustained arrythmia. No occurrence of slow heart rate. No palpitations. No claudication. No leg edema. Gastrointestinal - no abdominal swelling or pain. No blood in stool. No severe constipation, diarrhea, nausea, or vomiting. Genitourinary - no dysuria, frequency, or urgency. No flank pain or hematuria. Musculoskeletal - no back pain or myalgia. No problems with gait. Extremities - no clubbing, cyanosis or edema. Skin - no color change or rash. No pallor. No new surgical incision. Neurologic - no speech difficulty, facial asymmetry or lateralizing weakness. No seizures, presyncope or syncope. No significant dizziness. Hematologic - no easy bruising or excessive bleeding. Psychiatric - no severe anxiety or insomnia. No confusion. All other review of systems are negative. Objective  Vital Signs - /72   Ht 6' 2\" (1.88 m)   Wt 226 lb (102.5 kg)   BMI 29.02 kg/m²   General - Glendal is alert, cooperative, and pleasant. Well groomed. No acute distress. Body habitus - Body mass index is 29.02 kg/m². HEENT - Head is normocephalic. No circumoral cyanosis. Dentition is normal.  EYES -   Lids normal without ptosis. No discharge, edema or subconjunctival hemorrhage. Neck - Symmetrical without apparent mass or lymphadenopathy. Respiratory - Normal respiratory effort without use of accessory muscles. Ausculatation reveals vesicular breath sounds without crackles, wheezes, rub or rhonchi. Cardiovascular - No jugular venous distention.   Auscultation reveals regular vessel.      Left Anterior Coronary Artery:  The LAD is a moderate sized vessel with several diagonal branches. There is mild diffuse disease throughout the entire length of the vessel.      Left Circumflex Coronary Artery:  The LCx is a moderate sized vessel with several marginal branches. There is mild diffuse disease throughout the entire length of the vessel.      Right Coronary Artery:  The RCA is a moderate sized dominant vessel which arises from the right sinus of Valsalva. There is mild diffuse disease throughout the entire length of the vessel.       Left Ventriculogram:  The left ventriculogram is obtained in the right oblique projection. There is mild global left ventricular hypokinesis. The estimated visual ejection fraction is > 45%. There is no mitral regurgitation nor is there a pull back gradient seen across the aortic valve.   Summary:     1. Successful femoral artery ultrasound  2. Successful femoral artery arteriogram  3. Mild coronary artery disease  4. Left ventricular function is normal  Electronically signed by Kassandra Parson MD on 7/16/18 7/12/18 echo   Conclusions    Summary   Left atrial enlargement (4.4 cm.) with remaining chambers normal size. LV   size and systolic function within normal limits EF 55%. Mild (Grade I) LV   diastolic dysfunction evidenced by E/A ratio of 0.7%. Mitral valve   structurally and functionally normal. Tricuspid aortic valve structurally   and functionally normal. Right ventricle size and function normal. Normal   RVSP estimate of circ 20 mmHg. No significant pericardial effusion.    Signature    ----------------------------------------------------------------   Electronically signed by Mary Frankel MD(Interpreting physician)  Kacy Cox 07/12/2018 08:07 AM   ----------------------------------------------------------------    Pacemaker check showed adequate battery status @ 2.92 V . Mode:  AAIR-DDDR. Lead impedances are stable.   Pacing:  AP-VS 98.4%. Reprogramming for sensitivity and threshold testing. Appropriate diagnostics and safety margins noted. A output 1.00 V at 0.40 ms: P wave 2.3 mV  V output 0.50 V at 0.40 ms; R wave 15.7 mV  Sustained arrythmia:  none. Next Carelink remote transmission:  9/16/19. Stable CV status without symptoms of overt heart failure, arrhythmia or angina. No recurrent AF on Ryhmol. BP well controlled. PCP follows lipids. Patient is compliant with medication regimen. BP Readings from Last 3 Encounters:   06/11/19 120/72   04/04/19 132/86   10/23/18 106/68    Pulse Readings from Last 3 Encounters:   04/04/19 71   10/23/18 68   07/16/18 62        Wt Readings from Last 3 Encounters:   06/11/19 226 lb (102.5 kg)   04/04/19 223 lb 9.6 oz (101.4 kg)   10/23/18 211 lb (95.7 kg)     Plan  Previous cardiac history and records reviewed. Continue current medications as prescribed. Continue to follow up with primary care provider for non cardiac medical problems. Call the office with any problems, questions or concerns at [phone]. Follow up as scheduled with your cardiologist. Dr. Jhonatan Mejias and pacer check 6 months. Next Carelink 9/16/19. The following educational material has been included in this after visit summary for your review: Life simple 7. Heart health.     Additional instructions: If you feel that your heart is out of rhythm and the irregularity lasts for more than 4 to 6 hours, notify the office. If the office is closed, go to the ER. Coronary artery disease risk factors you can control: Smoking, high blood pressure, high cholesterol, diabetes, being overweight, lack of exercise and stress. Continue heart healthy diet. Take medications as directed. Exercise as tolerated. Strive for 15 minutes of exercise most days of the week. If asked to keep a blood pressure log, do so for 2 weeks. Call the office to report readings at [phone]. Blood pressure goal  is less than 120/70.     Elevated blood pressure at 120-129/80 or less. High blood pressure at 130-139/80-89. If you are taking cholesterol lowering medications, it is recommended that lab work be checked annually. Always keep a current medication list. Bring your medications to every office visit.       Chandra Zuniga, APRN

## 2019-06-11 NOTE — PATIENT INSTRUCTIONS
Continue current medications as prescribed. Continue to follow up with primary care provider for non cardiac medical problems. Call the office with any problems, questions or concerns at [phone]. Follow up as scheduled with your cardiologist. Dr. Kiya Meraz and tesha check 6 months. The following educational material has been included in this after visit summary for your review: Life simple 7. Heart health.     Additional instructions: If you feel that your heart is out of rhythm and the irregularity lasts for more than 4 to 6 hours, notify the office. If the office is closed, go to the ER. Coronary artery disease risk factors you can control: Smoking, high blood pressure, high cholesterol, diabetes, being overweight, lack of exercise and stress. Continue heart healthy diet. Take medications as directed. Exercise as tolerated. Strive for 15 minutes of exercise most days of the week. If asked to keep a blood pressure log, do so for 2 weeks. Call the office to report readings at [phone]. Blood pressure goal  is less than 120/70. Elevated blood pressure at 120-129/80 or less. High blood pressure at 130-139/80-89. If you are taking cholesterol lowering medications, it is recommended that lab work be checked annually. Always keep a current medication list. Bring your medications to every office visit. Life simple 7  1) Manage blood pressure - high blood pressure is a major risk factor for heart disease and stroke. Keeping blood pressure in health range reduces strain on your heart, arteries and kidneys. 2) Control cholesterol - contributes to plaque, which can clog arteries and lead to heart disease and stroke. When you control your cholesterol you are giving your arteries their best chance to remain clear. 3) Reduce blood sugar - most of the food we eat is turning into glucose or blood sugar that our body uses for energy.   Over time, high levels of blood sugar can damage your heart, kidneys, eyes and nerves. 4) Get active - living an active life is one of the most rewarding gifts you can give yourself and those you love. Simply put, daily physical activity increases your length and quality of life. 5)  Eat better - A healthy diet is one of your best weapons for fighting cardiovascular disease. When you eat a heart healthy diet, you improve your chances for feeling good and staying healthy for life. 6)  Lose weight - when you shed extra fat an unnecessary pounds, you reduce the burden on your hear, lungs, blood vessels and skeleton. You give yourself the gift of active living, you lower your blood pressure and help yourself feel better. 7) Stop smoking - cigarette smokers have a higher risk of developing cardiovascular disease. If  You smoke, quitting is the best thing you can do for your health. Check American Heart Association on line for more information on Life's Simple 7 and tips for healthy living.     Carelink pacemaker transmission:  1)  Your next scheduled transmission from home is 9/16/19. The pacemaker nurse will call you after the report has been reviewed. Follow the steps for sending your first transmission every time you are scheduled to send information to the clinic. 2) If you have questions about your monitor, call the office during hours of operation at [phone]. You can also call [employer] [facility] at [phone], Monday - Friday 7AM-6PM Central Time. 3)  If you have a new pacemaker, you should receive the monitor within a few weeks. If you have not received the box within 30 days, notify the office. Instructions for use will be included in the box. Patient Education        A Healthy Heart: Care Instructions  Your Care Instructions    Heart disease occurs when a substance called plaque builds up in the vessels that supply oxygen-rich blood to your heart. This can narrow the blood vessels and reduce blood flow.  A heart attack happens when blood flow is completely blocked. A high-fat diet, smoking, and other factors increase the risk of heart disease. Your doctor has found that you have a chance of having heart disease. You can do lots of things to keep your heart healthy. It may not be easy, but you can change your diet, exercise more, and quit smoking. These steps really work to lower your chance of heart disease. Follow-up care is a key part of your treatment and safety. Be sure to make and go to all appointments, and call your doctor if you are having problems. It's also a good idea to know your test results and keep a list of the medicines you take. How can you care for yourself at home? Diet    · Use less salt when you cook and eat. This helps lower your blood pressure. Taste food before salting. Add only a little salt when you think you need it. With time, your taste buds will adjust to less salt.     · Eat fewer snack items, fast foods, canned soups, and other high-salt, high-fat, processed foods.     · Read food labels and try to avoid saturated and trans fats. They increase your risk of heart disease by raising cholesterol levels.     · Limit the amount of solid fat-butter, margarine, and shortening-you eat. Use olive, peanut, or canola oil when you cook. Bake, broil, and steam foods instead of frying them.     · Eating fish can lower your risk for heart disease. Eat at least 2 servings of fish a week. [place], mackerel, herring, sardines, and chunk light tuna are very good choices. These fish contain omega-3 fatty acids.     · Eat a variety of fruit and vegetables every day. Dark green, deep orange, red, or yellow fruits and vegetables are especially good for you. Examples include spinach, carrots, peaches, and berries.     · Foods high in fiber can reduce your cholesterol and provide important vitamins and minerals.  High-fiber foods include whole-grain cereals and breads, oatmeal, beans, brown rice, citrus fruits, and apples.     [email or web address] and sign in to your [employer] account. Enter D188 in the [facility] box to learn more about \"A Healthy Heart: Care Instructions. \"     If you do not have an account, please click on the \"Sign Up Now\" link. Current as of: July 22, 2018  Content Version: 12.0  © 2211-9927 Healthwise, [facility]. Care instructions adapted under license by [facility] [place] ([facility]). If you have questions about a medical condition or this instruction, always ask your healthcare professional. [address] any warranty or liability for your use of this information.

## 2019-08-15 ENCOUNTER — ANESTHESIA (OUTPATIENT)
Dept: ENDOSCOPY | Age: 74
End: 2019-08-15
Payer: MEDICARE

## 2019-08-15 ENCOUNTER — ANESTHESIA EVENT (OUTPATIENT)
Dept: ENDOSCOPY | Age: 74
End: 2019-08-15
Payer: MEDICARE

## 2019-08-15 ENCOUNTER — HOSPITAL ENCOUNTER (OUTPATIENT)
Age: 74
Setting detail: OUTPATIENT SURGERY
Discharge: HOME | End: 2019-08-15
Attending: INTERNAL MEDICINE | Admitting: INTERNAL MEDICINE
Payer: MEDICARE

## 2019-08-15 VITALS
OXYGEN SATURATION: 100 % | HEART RATE: 60 BPM | SYSTOLIC BLOOD PRESSURE: 133 MMHG | WEIGHT: 215 LBS | HEIGHT: 74 IN | BODY MASS INDEX: 27.59 KG/M2 | TEMPERATURE: 98.2 F | RESPIRATION RATE: 18 BRPM | DIASTOLIC BLOOD PRESSURE: 68 MMHG

## 2019-08-15 VITALS
RESPIRATION RATE: 13 BRPM | OXYGEN SATURATION: 99 % | DIASTOLIC BLOOD PRESSURE: 63 MMHG | SYSTOLIC BLOOD PRESSURE: 116 MMHG

## 2019-08-15 LAB
GLUCOSE BLD-MCNC: 100 MG/DL (ref 70–99)
PERFORMED ON: ABNORMAL

## 2019-08-15 PROCEDURE — 7100000010 HC PHASE II RECOVERY - FIRST 15 MIN: Performed by: INTERNAL MEDICINE

## 2019-08-15 PROCEDURE — 2500000003 HC RX 250 WO HCPCS

## 2019-08-15 PROCEDURE — 2500000003 HC RX 250 WO HCPCS: Performed by: INTERNAL MEDICINE

## 2019-08-15 PROCEDURE — 2709999900 HC NON-CHARGEABLE SUPPLY: Performed by: INTERNAL MEDICINE

## 2019-08-15 PROCEDURE — 3700000000 HC ANESTHESIA ATTENDED CARE: Performed by: INTERNAL MEDICINE

## 2019-08-15 PROCEDURE — 88305 TISSUE EXAM BY PATHOLOGIST: CPT

## 2019-08-15 PROCEDURE — 6360000002 HC RX W HCPCS

## 2019-08-15 PROCEDURE — 7100000011 HC PHASE II RECOVERY - ADDTL 15 MIN: Performed by: INTERNAL MEDICINE

## 2019-08-15 PROCEDURE — 82948 REAGENT STRIP/BLOOD GLUCOSE: CPT

## 2019-08-15 PROCEDURE — 2580000003 HC RX 258: Performed by: INTERNAL MEDICINE

## 2019-08-15 PROCEDURE — 3609010600 HC COLONOSCOPY POLYPECTOMY SNARE/COLD BIOPSY: Performed by: INTERNAL MEDICINE

## 2019-08-15 PROCEDURE — 3700000001 HC ADD 15 MINUTES (ANESTHESIA): Performed by: INTERNAL MEDICINE

## 2019-08-15 RX ORDER — PROPOFOL 10 MG/ML
INJECTION, EMULSION INTRAVENOUS PRN
Status: DISCONTINUED | OUTPATIENT
Start: 2019-08-15 | End: 2019-08-15 | Stop reason: SDUPTHER

## 2019-08-15 RX ORDER — LIDOCAINE HYDROCHLORIDE 10 MG/ML
INJECTION, SOLUTION EPIDURAL; INFILTRATION; INTRACAUDAL; PERINEURAL PRN
Status: DISCONTINUED | OUTPATIENT
Start: 2019-08-15 | End: 2019-08-15 | Stop reason: SDUPTHER

## 2019-08-15 RX ORDER — SODIUM CHLORIDE, SODIUM LACTATE, POTASSIUM CHLORIDE, CALCIUM CHLORIDE 600; 310; 30; 20 MG/100ML; MG/100ML; MG/100ML; MG/100ML
INJECTION, SOLUTION INTRAVENOUS CONTINUOUS
Status: DISCONTINUED | OUTPATIENT
Start: 2019-08-15 | End: 2019-08-15 | Stop reason: HOSPADM

## 2019-08-15 RX ORDER — LIDOCAINE HYDROCHLORIDE 10 MG/ML
1 INJECTION, SOLUTION EPIDURAL; INFILTRATION; INTRACAUDAL; PERINEURAL ONCE
Status: COMPLETED | OUTPATIENT
Start: 2019-08-15 | End: 2019-08-15

## 2019-08-15 RX ADMIN — PROPOFOL 30 MG: 10 INJECTION, EMULSION INTRAVENOUS at 08:32

## 2019-08-15 RX ADMIN — PROPOFOL 50 MG: 10 INJECTION, EMULSION INTRAVENOUS at 08:22

## 2019-08-15 RX ADMIN — SODIUM CHLORIDE, POTASSIUM CHLORIDE, SODIUM LACTATE AND CALCIUM CHLORIDE: 600; 310; 30; 20 INJECTION, SOLUTION INTRAVENOUS at 07:34

## 2019-08-15 RX ADMIN — PROPOFOL 50 MG: 10 INJECTION, EMULSION INTRAVENOUS at 08:24

## 2019-08-15 RX ADMIN — PROPOFOL 50 MG: 10 INJECTION, EMULSION INTRAVENOUS at 08:17

## 2019-08-15 RX ADMIN — LIDOCAINE HYDROCHLORIDE 50 MG: 10 INJECTION, SOLUTION EPIDURAL; INFILTRATION; INTRACAUDAL; PERINEURAL at 08:17

## 2019-08-15 RX ADMIN — PROPOFOL 50 MG: 10 INJECTION, EMULSION INTRAVENOUS at 08:18

## 2019-08-15 RX ADMIN — LIDOCAINE HYDROCHLORIDE 1 ML: 10 INJECTION, SOLUTION EPIDURAL; INFILTRATION; INTRACAUDAL; PERINEURAL at 07:34

## 2019-08-15 RX ADMIN — PROPOFOL 50 MG: 10 INJECTION, EMULSION INTRAVENOUS at 08:19

## 2019-08-15 ASSESSMENT — PAIN - FUNCTIONAL ASSESSMENT: PAIN_FUNCTIONAL_ASSESSMENT: 0-10

## 2019-08-15 ASSESSMENT — ENCOUNTER SYMPTOMS: SHORTNESS OF BREATH: 1

## 2019-08-15 ASSESSMENT — PAIN SCALES - GENERAL
PAINLEVEL_OUTOF10: 0
PAINLEVEL_OUTOF10: 0

## 2019-08-29 DIAGNOSIS — Z95.0 PACEMAKER: ICD-10-CM

## 2019-08-29 DIAGNOSIS — I10 ESSENTIAL HYPERTENSION: ICD-10-CM

## 2019-08-29 DIAGNOSIS — I48.0 PAF (PAROXYSMAL ATRIAL FIBRILLATION) (HCC): ICD-10-CM

## 2019-08-29 RX ORDER — PROPAFENONE HYDROCHLORIDE 150 MG/1
TABLET, FILM COATED ORAL
Qty: 270 TABLET | Refills: 3 | Status: SHIPPED | OUTPATIENT
Start: 2019-08-29 | End: 2020-07-16

## 2019-09-26 ENCOUNTER — TELEPHONE (OUTPATIENT)
Dept: CARDIOLOGY | Age: 74
End: 2019-09-26

## 2019-09-27 ENCOUNTER — TELEPHONE (OUTPATIENT)
Dept: CARDIOLOGY | Age: 74
End: 2019-09-27

## 2019-09-27 DIAGNOSIS — I49.5 SICK SINUS SYNDROME (HCC): ICD-10-CM

## 2019-09-27 DIAGNOSIS — Z95.0 PACEMAKER: Primary | ICD-10-CM

## 2019-09-27 PROCEDURE — 93296 REM INTERROG EVL PM/IDS: CPT | Performed by: NURSE PRACTITIONER

## 2019-09-27 PROCEDURE — 93294 REM INTERROG EVL PM/LDLS PM: CPT | Performed by: NURSE PRACTITIONER

## 2019-12-31 PROCEDURE — 93294 REM INTERROG EVL PM/LDLS PM: CPT | Performed by: CLINICAL NURSE SPECIALIST

## 2019-12-31 PROCEDURE — 93296 REM INTERROG EVL PM/IDS: CPT | Performed by: CLINICAL NURSE SPECIALIST

## 2020-01-13 ENCOUNTER — HOSPITAL ENCOUNTER (OUTPATIENT)
Dept: GENERAL RADIOLOGY | Age: 75
Discharge: HOME | End: 2020-01-13
Payer: MEDICARE

## 2020-01-13 PROCEDURE — 74018 RADEX ABDOMEN 1 VIEW: CPT

## 2020-01-16 ENCOUNTER — HOSPITAL ENCOUNTER (OUTPATIENT)
Dept: CT IMAGING | Age: 75
Discharge: HOME | End: 2020-01-16
Payer: MEDICARE

## 2020-01-16 PROCEDURE — 74150 CT ABDOMEN W/O CONTRAST: CPT

## 2020-02-07 ENCOUNTER — HOSPITAL ENCOUNTER (OUTPATIENT)
Dept: ULTRASOUND IMAGING | Age: 75
Discharge: HOME | End: 2020-02-07
Payer: MEDICARE

## 2020-02-07 PROCEDURE — 76770 US EXAM ABDO BACK WALL COMP: CPT

## 2020-02-24 ENCOUNTER — OFFICE VISIT (OUTPATIENT)
Dept: UROLOGY | Age: 75
End: 2020-02-24
Payer: MEDICARE

## 2020-02-24 VITALS — HEIGHT: 74 IN | WEIGHT: 235 LBS | BODY MASS INDEX: 30.16 KG/M2 | TEMPERATURE: 97.1 F

## 2020-02-24 LAB
BACTERIA URINE, POC: ABNORMAL
BILIRUBIN URINE: 0 MG/DL
BLOOD, URINE: NEGATIVE
CASTS URINE, POC: ABNORMAL
CLARITY: CLEAR
COLOR: YELLOW
CRYSTALS URINE, POC: 0
EPI CELLS URINE, POC: 0
GLUCOSE URINE: ABNORMAL
KETONES, URINE: NEGATIVE
LEUKOCYTE EST, POC: ABNORMAL
NITRITE, URINE: POSITIVE
PH UA: 5.5 (ref 4.5–8)
PROTEIN UA: NEGATIVE
RBC URINE, POC: 0
SPECIFIC GRAVITY UA: 1.03 (ref 1–1.03)
UROBILINOGEN, URINE: NORMAL
WBC URINE, POC: 10
YEAST URINE, POC: 0

## 2020-02-24 PROCEDURE — 1123F ACP DISCUSS/DSCN MKR DOCD: CPT | Performed by: UROLOGY

## 2020-02-24 PROCEDURE — 4040F PNEUMOC VAC/ADMIN/RCVD: CPT | Performed by: UROLOGY

## 2020-02-24 PROCEDURE — 3017F COLORECTAL CA SCREEN DOC REV: CPT | Performed by: UROLOGY

## 2020-02-24 PROCEDURE — 99204 OFFICE O/P NEW MOD 45 MIN: CPT | Performed by: UROLOGY

## 2020-02-24 PROCEDURE — G8427 DOCREV CUR MEDS BY ELIG CLIN: HCPCS | Performed by: UROLOGY

## 2020-02-24 PROCEDURE — G8484 FLU IMMUNIZE NO ADMIN: HCPCS | Performed by: UROLOGY

## 2020-02-24 PROCEDURE — G8417 CALC BMI ABV UP PARAM F/U: HCPCS | Performed by: UROLOGY

## 2020-02-24 PROCEDURE — 81001 URINALYSIS AUTO W/SCOPE: CPT | Performed by: UROLOGY

## 2020-02-24 PROCEDURE — 1036F TOBACCO NON-USER: CPT | Performed by: UROLOGY

## 2020-02-24 ASSESSMENT — ENCOUNTER SYMPTOMS
CONSTIPATION: 0
DIARRHEA: 0
ABDOMINAL PAIN: 0
WHEEZING: 0
BACK PAIN: 0
EYE REDNESS: 0
SHORTNESS OF BREATH: 0
EYE DISCHARGE: 0
COUGH: 0

## 2020-02-24 NOTE — PROGRESS NOTES
Jerrod Narvaez is a 75 y.o. male who presents today   Chief Complaint   Patient presents with    New Patient     I am here through referral for a renal cyst      Renal mass:  The patient is here for evaluation of Right Renal Mass(es) that was diagnosed 1  month(s). The mass is  solid measuring 2.9 cm right lower pole. There is an adjacent 2.9 cm  mass is dated cystic of the right kidney. The mass is not fat containing. The mass is not simple. The mass is  complex. The mass cannot be determined whether it does enhance with contrast on imaging studies. A contrast study has not been performed. The mass can be described as Bosniak Class 4, and class 2. The Patient has has not had a previous biopsy showing this mass is   Malignant . The patient does not have  associated symptoms of flank pain,  The patient  does not have gross hematuria. The patient  does not have weight loss, and  does not have  bone pain. The  Patient has back pain which he describes he thinks was arthritis. He thought maybe he was having kidney stones was prompted him to get the x-ray studies which incidentally showed the right lower pole cystic and solid mass as described.          Past Medical History:   Diagnosis Date    CAD (coronary artery disease) 12/6/2012    Colon polyp     Diabetes mellitus ([facility]     GERD (gastroesophageal reflux disease)     Hyperlipidemia     Hypertension     Near syncope 6/10/2012    Pacemaker 6/11/12    PAF (paroxysmal atrial fibrillation) ([facility] 10/30/2012       Past Surgical History:   Procedure Laterality Date    APPENDECTOMY      CARDIAC CATHETERIZATION  12/6/12   JDT    EF  60%  with angioplasty    CHOLECYSTECTOMY, LAPAROSCOPIC  11/2/15    COLONOSCOPY  1/2015    Nancy: mult polyps, 5 were tubular adenomas    COLONOSCOPY N/A 3/10/2016    Dr Ted Weeks AP x 3, dysplasia (-), HP x 1, 3 yr recall    COLONOSCOPY N/A 8/15/2019    Dr Radha Rand hemorrhoids-Grade 2, diverticulosis, AP-5 yr Social History Narrative    None       Family History   Problem Relation Age of Onset    Heart Failure Mother          at age 80    Heart Disease Maternal Uncle     Colon Cancer Neg Hx     Colon Polyps Neg Hx     Esophageal Cancer Neg Hx     Liver Disease Neg Hx     Liver Cancer Neg Hx     Rectal Cancer Neg Hx     Stomach Cancer Neg Hx        REVIEW OF SYSTEMS:  Review of Systems   Constitutional: Negative for chills and fever. HENT: Negative for congestion and hearing loss. Eyes: Negative for discharge and redness. Respiratory: Negative for cough, shortness of breath and wheezing. Cardiovascular: Negative for leg swelling. Gastrointestinal: Negative for abdominal pain, constipation and diarrhea. Endocrine: Negative for cold intolerance and heat intolerance. Genitourinary: Negative for decreased urine volume, difficulty urinating, dysuria, enuresis, flank pain, frequency, genital sores, hematuria and urgency. Musculoskeletal: Negative for back pain and gait problem. Skin: Negative for rash. Allergic/Immunologic: Negative for environmental allergies. Neurological: Negative for dizziness, weakness and headaches. Hematological: Does not bruise/bleed easily. Psychiatric/Behavioral: Negative for behavioral problems, confusion and sleep disturbance. PHYSICAL EXAM:  Temp 97.1 °F (36.2 °C) (Temporal)   Ht 6' 2\" (1.88 m)   Wt 235 lb (106.6 kg)   BMI 30.17 kg/m²   Physical Exam  Vitals signs and nursing note reviewed. Constitutional:       Appearance: He is well-developed. HENT:      Head: Normocephalic and atraumatic. Eyes:      General: No scleral icterus. Conjunctiva/sclera: Conjunctivae normal.   Neck:      Musculoskeletal: Normal range of motion. Cardiovascular:      Rate and Rhythm: Normal rate and regular rhythm. Pulmonary:      Effort: Pulmonary effort is normal. No respiratory distress. Breath sounds: Normal breath sounds.    Abdominal:

## 2020-03-09 ENCOUNTER — HOSPITAL ENCOUNTER (OUTPATIENT)
Dept: CT IMAGING | Age: 75
Discharge: HOME | End: 2020-03-09
Payer: MEDICARE

## 2020-03-09 ENCOUNTER — OFFICE VISIT (OUTPATIENT)
Dept: UROLOGY | Age: 75
End: 2020-03-09
Payer: MEDICARE

## 2020-03-09 VITALS — HEIGHT: 74 IN | TEMPERATURE: 98.7 F | BODY MASS INDEX: 30.03 KG/M2 | WEIGHT: 234 LBS

## 2020-03-09 PROBLEM — N28.89 RENAL MASS, RIGHT: Status: ACTIVE | Noted: 2020-03-09

## 2020-03-09 LAB
BACTERIA URINE, POC: ABNORMAL
BILIRUBIN URINE: 0 MG/DL
BLOOD, URINE: POSITIVE
CASTS URINE, POC: ABNORMAL
CLARITY: CLEAR
COLOR: YELLOW
CRYSTALS URINE, POC: ABNORMAL
EPI CELLS URINE, POC: ABNORMAL
GLUCOSE URINE: ABNORMAL
KETONES, URINE: NEGATIVE
LEUKOCYTE EST, POC: ABNORMAL
NITRITE, URINE: NEGATIVE
PH UA: 5.5 (ref 4.5–8)
PROTEIN UA: NEGATIVE
RBC URINE, POC: ABNORMAL
SPECIFIC GRAVITY UA: 1.01 (ref 1–1.03)
UROBILINOGEN, URINE: NORMAL
WBC URINE, POC: ABNORMAL
YEAST URINE, POC: ABNORMAL

## 2020-03-09 PROCEDURE — 81001 URINALYSIS AUTO W/SCOPE: CPT | Performed by: UROLOGY

## 2020-03-09 PROCEDURE — 3017F COLORECTAL CA SCREEN DOC REV: CPT | Performed by: UROLOGY

## 2020-03-09 PROCEDURE — 74178 CT ABD&PLV WO CNTR FLWD CNTR: CPT

## 2020-03-09 PROCEDURE — G8417 CALC BMI ABV UP PARAM F/U: HCPCS | Performed by: UROLOGY

## 2020-03-09 PROCEDURE — 4040F PNEUMOC VAC/ADMIN/RCVD: CPT | Performed by: UROLOGY

## 2020-03-09 PROCEDURE — 6360000004 HC RX CONTRAST MEDICATION: Performed by: UROLOGY

## 2020-03-09 PROCEDURE — G8427 DOCREV CUR MEDS BY ELIG CLIN: HCPCS | Performed by: UROLOGY

## 2020-03-09 PROCEDURE — 99214 OFFICE O/P EST MOD 30 MIN: CPT | Performed by: UROLOGY

## 2020-03-09 PROCEDURE — 1036F TOBACCO NON-USER: CPT | Performed by: UROLOGY

## 2020-03-09 PROCEDURE — G8484 FLU IMMUNIZE NO ADMIN: HCPCS | Performed by: UROLOGY

## 2020-03-09 PROCEDURE — 1123F ACP DISCUSS/DSCN MKR DOCD: CPT | Performed by: UROLOGY

## 2020-03-09 RX ADMIN — IOPAMIDOL 75 ML: 755 INJECTION, SOLUTION INTRAVENOUS at 11:05

## 2020-03-09 ASSESSMENT — ENCOUNTER SYMPTOMS
COLOR CHANGE: 0
ABDOMINAL DISTENTION: 0
BLOOD IN STOOL: 0
WHEEZING: 0
EYE PAIN: 0
FACIAL SWELLING: 0
RHINORRHEA: 0
BACK PAIN: 0
DIARRHEA: 0
VOMITING: 0
EYE DISCHARGE: 0
SORE THROAT: 0
NAUSEA: 0
CONSTIPATION: 0
EYE REDNESS: 0
SHORTNESS OF BREATH: 0
ABDOMINAL PAIN: 0
SINUS PRESSURE: 0
COUGH: 0

## 2020-03-09 NOTE — PROGRESS NOTES
3, dysplasia (-), HP x 1, 3 yr recall    COLONOSCOPY N/A 8/15/2019    Dr Jaquez Ringgold hemorrhoids-Grade 2, diverticulosis, AP-5 yr recall    EYE SURGERY Bilateral     CATARACT    FRACTURE SURGERY      Nose    PACEMAKER PLACEMENT         Current Outpatient Medications   Medication Sig Dispense Refill    metoprolol tartrate (LOPRESSOR) 25 MG tablet TAKE ONE-HALF TABLET BY  MOUTH TWICE A DAY 90 tablet 3    propafenone (RYTHMOL) 150 MG tablet TAKE 1 TABLET BY MOUTH  EVERY 8 HOURS 270 tablet 3    famotidine (PEPCID) 20 MG tablet Take 20 mg by mouth every evening      losartan (COZAAR) 100 MG tablet Take 0.5 tablet twice daily      metFORMIN (GLUCOPHAGE) 500 MG tablet Take 500 mg by mouth 2 times daily (with meals)      aspirin 81 MG tablet Take 81 mg by mouth daily. No current facility-administered medications for this visit.         Allergies   Allergen Reactions    Lisinopril      cough       Social History     Socioeconomic History    Marital status:      Spouse name: None    Number of children: None    Years of education: None    Highest education level: None   Occupational History    None   Social Needs    Financial resource strain: None    Food insecurity     Worry: None     Inability: None    Transportation needs     Medical: None     Non-medical: None   Tobacco Use    Smoking status: Former Smoker     Packs/day: 0.00     Types: Cigarettes    Smokeless tobacco: Never Used   Substance and Sexual Activity    Alcohol use: No    Drug use: No    Sexual activity: None   Lifestyle    Physical activity     Days per week: None     Minutes per session: None    Stress: None   Relationships    Social connections     Talks on phone: None     Gets together: None     Attends [employer] service: None     Active member of club or organization: None     Attends meetings of clubs or organizations: None     Relationship status: None    Intimate partner violence     Fear of current or BMI 30.04 kg/m²   Physical Exam  Constitutional:       General: He is not in acute distress. Appearance: Normal appearance. He is well-developed. HENT:      Head: Normocephalic and atraumatic. Nose: Nose normal.   Eyes:      General: No scleral icterus. Conjunctiva/sclera: Conjunctivae normal.      Pupils: Pupils are equal, round, and reactive to light. Neck:      Musculoskeletal: Normal range of motion and neck supple. Trachea: No tracheal deviation. Cardiovascular:      Rate and Rhythm: Normal rate and regular rhythm. Pulmonary:      Effort: Pulmonary effort is normal. No respiratory distress. Breath sounds: No stridor. Abdominal:      General: There is no distension. Palpations: Abdomen is soft. There is no mass. Tenderness: There is no abdominal tenderness. Comments: Appendectomy scar   Musculoskeletal: Normal range of motion. General: No tenderness. Lymphadenopathy:      Cervical: No cervical adenopathy. Skin:     General: Skin is warm and dry. Findings: No erythema. Neurological:      Mental Status: He is alert and oriented to person, place, and time.    Psychiatric:         Behavior: Behavior normal.         Judgment: Judgment normal.             DATA:  CBC:   Lab Results   Component Value Date    WBC 7.4 07/10/2018    RBC 5.17 07/10/2018    HGB 14.4 07/10/2018    HCT 44.9 07/10/2018    MCV 86.8 07/10/2018    MCH 27.9 07/10/2018    MCHC 32.1 07/10/2018    RDW 14.6 07/10/2018     07/10/2018    MPV 11.6 07/10/2018     CMP:    Lab Results   Component Value Date     07/10/2018    K 4.3 07/10/2018     07/10/2018    CO2 27 07/10/2018    BUN 22 07/10/2018    CREATININE 1.1 07/10/2018    LABGLOM >60 07/10/2018    GLUCOSE 85 07/10/2018    PROT 7.2 10/11/2015    PROT 6.7 12/06/2012    LABALBU 4.3 10/11/2015    CALCIUM 9.6 07/10/2018    ALKPHOS 44 10/11/2015    AST 15 10/11/2015    ALT 21 10/11/2015     Results for orders placed or performed in visit on 03/09/20   POCT Urinalysis Dipstick w/ Micro (Auto)   Result Value Ref Range    Color, UA Yellow     Clarity, UA Clear Clear    Glucose, Ur neg     Bilirubin Urine 0 mg/dL    Ketones, Urine Negative     Specific Gravity, UA 1.015 1.005 - 1.030    Blood, Urine Positive     pH, UA 5.5 4.5 - 8.0    Protein, UA Negative Negative    Nitrite, Urine Negative     Leukocytes, UA small     Urobilinogen, Urine Normal     rbc urine, poc      wbc urine, poc      bacteria urine, poc      yeast urine, poc      casts urine, poc      epi cells urine, poc      crystals urine, poc       No results found for: PSA  No results found for: PSAFREEPCT          IMAGING:  CT scan done today 3/9/2020 with and without contrast for renal mass protocol shows a 2.9 cm solid enhancing mass off the anterior lower pole the right kidney. This is exophytic. Though it does extend to the hilar fat and may be abuts along the lower pole calyx. But does appear to be amenable to partial nephrectomy. There is a 3 cm benign-appearing cyst in the mid interpolar region of the right kidney as well. There are 2 right renal veins. Single right renal artery. There is no hilar or retroperitoneal adenopathy or evidence of abdominal metastasis. The left kidney appears to be normal with some small cortical cyst but otherwise no hydronephrosis no stones normal-appearing contralateral left kidney. 1. Renal mass, right  Solid enhancing right lower pole exophytic renal mass. 2.9cm  This appears to be amenable to partial nephrectomy. We discussed this in comparison to do a laparoscopic complete nephrectomy. Discussing the pros and cons of each approach.   He does understand the primary goal for partial nephrectomy is to preserve remaining kidney and therefore overall renal function but there are increased risk of immediate postop complications such as bleeding urine leak urinoma etc. versus nephrectomy which could lead to chronic kidney

## 2020-03-10 ENCOUNTER — OFFICE VISIT (OUTPATIENT)
Dept: CARDIOLOGY | Age: 75
End: 2020-03-10
Payer: MEDICARE

## 2020-03-10 VITALS
DIASTOLIC BLOOD PRESSURE: 84 MMHG | HEART RATE: 77 BPM | HEIGHT: 74 IN | BODY MASS INDEX: 30.16 KG/M2 | WEIGHT: 235 LBS | SYSTOLIC BLOOD PRESSURE: 162 MMHG

## 2020-03-10 PROCEDURE — 4040F PNEUMOC VAC/ADMIN/RCVD: CPT | Performed by: NURSE PRACTITIONER

## 2020-03-10 PROCEDURE — 1036F TOBACCO NON-USER: CPT | Performed by: NURSE PRACTITIONER

## 2020-03-10 PROCEDURE — G8484 FLU IMMUNIZE NO ADMIN: HCPCS | Performed by: NURSE PRACTITIONER

## 2020-03-10 PROCEDURE — 99214 OFFICE O/P EST MOD 30 MIN: CPT | Performed by: NURSE PRACTITIONER

## 2020-03-10 PROCEDURE — 1123F ACP DISCUSS/DSCN MKR DOCD: CPT | Performed by: NURSE PRACTITIONER

## 2020-03-10 PROCEDURE — 93288 INTERROG EVL PM/LDLS PM IP: CPT | Performed by: NURSE PRACTITIONER

## 2020-03-10 PROCEDURE — 3017F COLORECTAL CA SCREEN DOC REV: CPT | Performed by: NURSE PRACTITIONER

## 2020-03-10 PROCEDURE — G8417 CALC BMI ABV UP PARAM F/U: HCPCS | Performed by: NURSE PRACTITIONER

## 2020-03-10 PROCEDURE — G8427 DOCREV CUR MEDS BY ELIG CLIN: HCPCS | Performed by: NURSE PRACTITIONER

## 2020-03-10 RX ORDER — AMLODIPINE BESYLATE 5 MG/1
5 TABLET ORAL DAILY
Qty: 90 TABLET | Refills: 1 | Status: SHIPPED | OUTPATIENT
Start: 2020-03-10 | End: 2020-07-16 | Stop reason: SDUPTHER

## 2020-03-10 NOTE — PATIENT INSTRUCTIONS
Start Norvasc 5 mg daily    Your next [address] home  check is scheduled for 6/11/20. Completing a Blood Pressure Log    Your doctor has recommended completing a blood pressure log to see what your blood pressure runs at home. Managing your blood pressure can be very beneficial in your overall health. Listed below are a few tips and instructions on how to check your blood pressure correctly. · Always check your blood pressure AFTER taking all blood pressure medications. Waiting about 2 hours gives the best results on how your medication is working. · Before checking your blood pressure come inside, sit comfortably for 5 to 10 minutes and then check your blood pressure. Your arm should rest comfortably, sit up straight with your back against the chair, legs uncrossed and feet touching the floor. · Your blood pressure will typically run higher when you have recently been excercising, smoking, or drinking caffiene and can give inaccurate readings, try to wait 30 minutes before checking your blood pressure. · Check your blood pressure in the mornings and at night for  3 days a week and write it down. After 2 to 3 weeks of gathering readings you can call, e-mail, or fax in your results. Our office number is [phone], our fax number is [phone], and you can always e-mail us via [employer] to send your results directly to your Doctor. Atrial Fibrillation     Definition   Atrial fibrillation is an abnormal heart rhythm. The heart's electrical system normally sends regularly spaced signals telling the heart muscle to beat. The heart has two upper chambers, called atria, and two lower chambers, called ventricles. Each signal starts in the atria and travels to the rest of the heart. In atrial fibrillation, the electrical signals from the atria are fast and irregular. The atria quiver, rather than contract.  Some signals do not reach the ventricles and the ventricles continue pumping, usually irregularly and sometimes rapidly. This uncoordinated rhythm can reduce the heart's efficiency at pumping blood out to the body. Blood left in the heart chambers can form clots. These clots may sometimes break away, travel to the brain, and cause a stroke . Atrial Fibrillation        [facility].   Causes   In most cases, atrial fibrillation is due to an existing heart condition. But atrial fibrillation can occur in people with no structural heart problems. A thyroid disorder or other condition may cause the abnormal rhythm. In some cases, the cause is unknown. Risk Factors   Risk factors include:   · Cardiovascular diseases:   ¨ High blood pressure   ¨ Coronary artery disease   ¨ Congestive heart failure   ¨ Heart attack   ¨ Heart valve disease   ¨ Endocarditis (infection of a heart valve)   ¨ Cardiomyopathy (disease of the heart muscle)   ¨ Congenital heart disease   ¨ Prior episode of atrial fibrillation   · Lung diseases:   ¨ Emphysema   ¨ Asthma   ¨ Blood clots in the lungs   · Age: 54 or older   · Smoking   · Chronic conditions:   ¨ Overactive thyroid   ¨ Diabetes   · Excessive alcohol intake   · Use of stimulant drugs, including caffeine   · Sex: male   · Undergoing general anesthesia   · Stress, either emotional or physical   · Family history of atrial fibrillation     Symptoms   Symptoms can vary from mild to severe, depending on your heart function and overall health. Some people may not notice any symptoms.    Symptoms include:   · Irregular or rapid pulse or heart beat   · Racing feeling in the chest   · Palpitations, or a pounding feeling in the chest   · Dizziness, lightheadedness, or fainting   · Sweating   · Pain or pressure in the chest   · Shortness of breath   · Fatigue or weakness   · Exercise intolerance     Treatment   The goals of treatment are to:   · Restore a regular rhythm, if possible   · Keep heart rate as close to normal   ¨ Your doctor will tell you what your target heart rate is. In general, the your resting rate should be between 60-80 beats per minute, and  beats per minute during moderate exercise. · Prevent blood clots from forming   If an underlying cause of atrial fibrillation is found, it may be treated. Some patients return to a normal rhythm without treatment. Treatments include:   Medication   · Drugs to slow the heart rate, such as:   ¨ Digitalis   ¨ Verapamil   ¨ Diltiazem   ¨ Metoprolol   ¨ Atenolol   · Drugs to keep the heart in a regular rhythm, such as:   ¨ Sotalol (Betapace)  ¨ Propafenone (Rythmol)  ¨ Amiodarone   ¨ Multaq  · Drugs to prevent clot formation, such as warfarin (Coumadin) or Pradaxa    If you are started on a blood thinners, you will need at least 6 weeks prior to trying to get your heart back into a normal rhythm. If you are on Coumadin, you will need weekly blood checks to monitor your INR. You will need to keep your INR between 2.0-2.5 or 2.0 and 3.0 as your doctor's directions. Cardioversion   Cardioversion is a procedure that uses an electrical current or drugs to help normalize the heart rhythm. You will be in the hospital approximately 2-3 days to start antiarrhythmics (to keep your heart in rhythm) and shock to get you back in a normal rhythm. Hypertension  (High Blood Pressure)  Most people with high blood pressure (hypertension) have no symptoms. High blood pressure can be a dangerous problem. Hypertension is dangerous because you may have it and not know it. High blood pressure may mean that your heart needs to work harder to pump blood. Your blood pressure is measured with 2 numbers. The first number is when your heart flexes (contracts), and the second number is when your heart relaxes. The higher the numbers are, the more you are at risk for problems. Write down your blood pressure today. The best blood pressure for adults is 120/80 (mmHg) or lower.  It is likely that your blood pressure was recorded at least 2 times today. It is important for you to give these numbers to your doctor. If you do not have a doctor, try to get follow-up care at a hospital or community clinic. You may need to start high blood pressure medicine. You may also need to adjust your medicines as told by your doctor. Even mild high blood pressure increases long-term health risks. One high reading does not mean you have hypertension. · Your blood pressure should be taken when you are relaxed. It is also important to sit for about 10 minutes before being tested. · Things that can increase your blood pressure are:  Injury, Illness, Stress, Caffeine, and some medicines (like decongestants). · High blood pressure does not usually need emergency treatment. HOME CARE  · Lifestyle and medicine changes may be needed, including:   · Weight loss. · Exercise. · Limit the use of salt. · Stop smoking. · If using decongestants or birth control pills, talk to your doctor. These medicines might make blood pressure higher. · Do not use street drugs. · Females should not drink more than 1 alcoholic drink per day. Males should not drink more than 2 alcoholic drinks per day. · Take your blood pressure medicine. You will need to take it every day. If you do not get treated, there are risks, including:   · Heart disease. · Stroke. · Kidney failure. · See your doctor as told. GET HELP RIGHT AWAY IF:  · You get a very bad headache. · You get blurred or changing vision. · You feel confused. · You feel weak, numb, or faint. · You get chest or belly (abdominal) pain. · You throw up (vomit). · You cannot breathe very well. If you have a blood pressure reading with a top number of 180 or higher, you need to see your doctor right away. This is especially true if you are having any of the problems listed above.

## 2020-03-10 NOTE — PROGRESS NOTES
Dear Dr. Orville Davis MD,    Thank you for allowing me to participate in the care of Mr. Duyen Barrientos. He presents today at [place]. As you know, Mr. Alix Lopez is a 75 y.o. male with history of hypertension, hyperlipidemia, diabetes, GERD, SSS, Pacemaker, PAF, and CAD who presents with the chief complaint of follow-up for chronic cardiac conditions. He is a patient of Dr. Roosevelt Robbins. Since last seen, he has been stable from a cardiac standpoint. He denies any exertional chest pain. He does have RITTER which is chronic and stable. He denies any fast or slow heart rhythms. His BP runs on average systolic [id number] at home and diastolic 54B. he is sleeping on 1 pillow at night. he is not waking gasping for air. he denies any swelling. he has been compliant with his medications. PCP follows labs and lipids. He otherwise denies chest pain, SOA, RITTER, PND, orthopnea, syncope or near syncope. He has no other complaints. Review of Systems   Constitutional: Negative for fever, chills, diaphoresis, activity change, appetite change, fatigue and unexpected weight change. Eyes: Negative for photophobia, pain, redness and visual disturbance. Respiratory: Negative for apnea, cough, chest tightness, shortness of breath, wheezing and stridor. Cardiovascular: Negative for chest pain, palpitations and leg swelling. Gastrointestinal: Negative for abdominal distention. Genitourinary: Negative for dysuria, urgency and frequency. Musculoskeletal: Negative for myalgias, arthralgias and gait problem. Skin: Negative for color change, pallor, rash and wound. Neurological: Negative for dizziness, tremors, speech difficulty, weakness and numbness. Hematological: Does not bruise/bleed easily. Psychiatric/Behavioral: Negative.         Past Medical History:   Diagnosis Date    CAD (coronary artery disease) 12/6/2012    Colon polyp     Diabetes mellitus (HCC)     GERD (gastroesophageal reflux disease)     Hyperlipidemia Nondenominational service: Not on file     Active member of club or organization: Not on file     Attends meetings of clubs or organizations: Not on file     Relationship status: Not on file    Intimate partner violence     Fear of current or ex partner: Not on file     Emotionally abused: Not on file     Physically abused: Not on file     Forced sexual activity: Not on file   Other Topics Concern    Not on file   Social History Narrative    Not on file       Allergies   Allergen Reactions    Lisinopril      cough         Current Outpatient Medications:     metoprolol tartrate (LOPRESSOR) 25 MG tablet, TAKE ONE-HALF TABLET BY  MOUTH TWICE A DAY, Disp: 90 tablet, Rfl: 3    propafenone (RYTHMOL) 150 MG tablet, TAKE 1 TABLET BY MOUTH  EVERY 8 HOURS, Disp: 270 tablet, Rfl: 3    famotidine (PEPCID) 20 MG tablet, Take 20 mg by mouth every evening, Disp: , Rfl:     losartan (COZAAR) 100 MG tablet, Take 0.5 tablet twice daily, Disp: , Rfl:     metFORMIN (GLUCOPHAGE) 500 MG tablet, Take 500 mg by mouth 2 times daily (with meals), Disp: , Rfl:     aspirin 81 MG tablet, Take 81 mg by mouth daily. , Disp: , Rfl:     PE:  Vitals:    03/10/20 1152   BP: (!) 162/84   Pulse: 77       Estimated body mass index is 30.17 kg/m² as calculated from the following:    Height as of this encounter: 6' 2\" (1.88 m). Weight as of this encounter: 235 lb (106.6 kg). Constitutional: He is oriented to person, place, and time. He appears well-developed and well-nourished in no acute distress. Neck:  Neck supple without JVD present. No trachea deviation present. No thyromegaly present. Eyes:Conjunctivae and EOM are normal. Pupils equal and reactive to light. ENT:Hearing appears normal.conjunctiva and lids are normal, ears and nose appear normal.  Cardiovascular: Normal rate, normal rhythm, normal heart sounds. No murmur ascultated. No gallop and no friction rub. No carotid bruits. No peripheral edema.     Pulmonary/Chest:  Lungs clear

## 2020-04-02 ENCOUNTER — TELEPHONE (OUTPATIENT)
Dept: CARDIOLOGY | Age: 75
End: 2020-04-02

## 2020-05-20 ENCOUNTER — TELEPHONE (OUTPATIENT)
Dept: CARDIOLOGY | Age: 75
End: 2020-05-20

## 2020-05-20 NOTE — TELEPHONE ENCOUNTER
Date: 6/12/20    Cardiologist: ANASTASIA    Procedure: partial nephrectomy    Surgeon: Rudy Pretty    Last Office Visit: 3/10/20  Reason for office visit and medical concerns addressed at this office visit: cad, dm, hyperlipidemia, htn, cardiomyopathy, pacer, sss, afib    Testing Performed and Date of Service:  7/16/18 Cath mild CAD    RCRI = 6.6   METs 3    Current Medications: assa, amlodipine, famotidine, losartan, metformin, lopressor, rythmol    Is the patient currently taking an anticoagulant?  If so, what is the diagnosis the patient has been given to warrant the need for the anticoagulant? aspirin    Additional Notes: requesting cardiac clearance and to hodl asa 7 days prior to procedure

## 2020-06-09 ENCOUNTER — OFFICE VISIT (OUTPATIENT)
Age: 75
End: 2020-06-09

## 2020-06-09 VITALS — TEMPERATURE: 97.5 F | OXYGEN SATURATION: 96 %

## 2020-06-09 NOTE — PATIENT INSTRUCTIONS
Preventing the Spread of Coronavirus Disease 2019 in Homes and Residential Communities   For the most recent information go to [email or web address]    Prevention steps for People with confirmed or suspected COVID-19 (including persons under investigation) who do not need to be hospitalized  and   People with confirmed COVID-19 who were hospitalized and determined to be medically stable to go home    Your healthcare provider and public health staff will evaluate whether you can be cared for at home. If it is determined that you do not need to be hospitalized and can be isolated at home, you will be monitored by staff from your local or state health department. You should follow the prevention steps below until a healthcare provider or local or state health department says you can return to your normal activities. Stay home except to get medical care  People who are mildly ill with COVID-19 are able to isolate at home during their illness. You should restrict activities outside your home, except for getting medical care. Do not go to work, school, or public areas. Avoid using public transportation, ride-sharing, or taxis. Separate yourself from other people and animals in your home  People: As much as possible, you should stay in a specific room and away from other people in your home. Also, you should use a separate bathroom, if available. Animals: You should restrict contact with pets and other animals while you are sick with COVID-19, just like you would around other people. Although there have not been reports of pets or other animals becoming sick with COVID-19, it is still recommended that people sick with COVID-19 limit contact with animals until more information is known about the virus. When possible, have another member of your household care for your animals while you are sick.  If you are sick with COVID-19, avoid contact with your pet, including departments. Mc4 allows you to send messages to your doctor, view your test results, renew your prescriptions, schedule appointments, view visit notes, and more. How Do I Sign Up? 1. In your Internet browser, go to [email or web address]  2. Click on the Sign Up Now link in the Sign In box. You will see the New Member Sign Up page. 3. Enter your Mc4 Access Code exactly as it appears below. You will not need to use this code after youve completed the sign-up process. If you do not sign up before the expiration date, you must request a new code. aitainmentt Access Code: Activation code not generated  Current Mc4 Status: Active    4. Enter your Social Security Number ([id number]) and Date of Birth (mm/dd/yyyy) as indicated and click Submit. You will be taken to the next sign-up page. 5. Create a Mc4 ID. This will be your Mc4 login ID and cannot be changed, so think of one that is secure and easy to remember. 6. Create a Mc4 password. You can change your password at any time. 7. Enter your Password Reset Question and Answer. This can be used at a later time if you forget your password. 8. Enter your e-mail address. You will receive e-mail notification when new information is available in [address] 9. Click Sign Up. You can now view your medical record. Additional Information  If you have questions, please contact the physician practice where you receive care. Remember, Mc4 is NOT to be used for urgent needs. For medical emergencies, dial 911. For questions regarding your Mc4 account call [phone]. If you have a clinical question, please call your doctor's office.

## 2020-06-10 LAB
REPORT: NORMAL
SARS-COV-2: NOT DETECTED
THIS TEST SENT TO: NORMAL

## 2020-06-12 ENCOUNTER — TELEPHONE (OUTPATIENT)
Dept: CARDIOLOGY | Age: 75
End: 2020-06-12

## 2020-06-22 ENCOUNTER — TELEPHONE (OUTPATIENT)
Dept: CARDIOLOGY | Age: 75
End: 2020-06-22

## 2020-06-22 PROCEDURE — 93294 REM INTERROG EVL PM/LDLS PM: CPT | Performed by: CLINICAL NURSE SPECIALIST

## 2020-06-22 PROCEDURE — 93296 REM INTERROG EVL PM/IDS: CPT | Performed by: CLINICAL NURSE SPECIALIST

## 2020-06-29 RX ORDER — PROPAFENONE HYDROCHLORIDE 150 MG/1
TABLET, FILM COATED ORAL
Qty: 270 TABLET | Refills: 3 | OUTPATIENT
Start: 2020-06-29

## 2020-07-16 RX ORDER — PROPAFENONE HYDROCHLORIDE 150 MG/1
TABLET, FILM COATED ORAL
Qty: 270 TABLET | Refills: 3 | Status: SHIPPED | OUTPATIENT
Start: 2020-07-16 | End: 2021-08-04

## 2020-07-16 RX ORDER — AMLODIPINE BESYLATE 5 MG/1
5 TABLET ORAL DAILY
Qty: 90 TABLET | Refills: 1 | Status: SHIPPED | OUTPATIENT
Start: 2020-07-16 | End: 2020-12-28 | Stop reason: SDUPTHER

## 2020-10-07 ENCOUNTER — OFFICE VISIT (OUTPATIENT)
Dept: CARDIOLOGY | Age: 75
End: 2020-10-07
Payer: MEDICARE

## 2020-10-07 VITALS
HEIGHT: 74 IN | DIASTOLIC BLOOD PRESSURE: 82 MMHG | HEART RATE: 67 BPM | WEIGHT: 236 LBS | SYSTOLIC BLOOD PRESSURE: 138 MMHG | BODY MASS INDEX: 30.29 KG/M2

## 2020-10-07 PROCEDURE — 93280 PM DEVICE PROGR EVAL DUAL: CPT | Performed by: CLINICAL NURSE SPECIALIST

## 2020-10-07 NOTE — PROGRESS NOTES
Diagnosis Orders   1. Sick sinus syndrome ([facility]     2. Pacemaker     3. PAF (paroxysmal atrial fibrillation) ([facility]       Pacemaker transmission sent 10/5/2020 showed episode of GUERO on atrial lead. Patient was brought in to assess. [address] [place] [address] in office to evaluate as well. Provocative maneuvers show no recurrence of GUERO. P waves quite small and atrially paces 99%. Capped atrial sensitivity at 0.3 mV so GUERO would not inhibit any necessary pacing.   Will check device in 1 month for battery status as he is now at 2.85 V as well as for any further GUERO  Other testing and thresholds adequate

## 2020-12-08 PROCEDURE — 93294 REM INTERROG EVL PM/LDLS PM: CPT | Performed by: CLINICAL NURSE SPECIALIST

## 2020-12-08 PROCEDURE — 93296 REM INTERROG EVL PM/IDS: CPT | Performed by: CLINICAL NURSE SPECIALIST

## 2020-12-28 RX ORDER — AMLODIPINE BESYLATE 5 MG/1
5 TABLET ORAL DAILY
Qty: 90 TABLET | Refills: 3 | Status: SHIPPED | OUTPATIENT
Start: 2020-12-28 | End: 2021-10-28

## 2021-01-11 DIAGNOSIS — I49.5 SICK SINUS SYNDROME (HCC): ICD-10-CM

## 2021-01-11 DIAGNOSIS — Z95.0 PACEMAKER: Primary | ICD-10-CM

## 2021-02-10 DIAGNOSIS — I49.5 SICK SINUS SYNDROME (HCC): ICD-10-CM

## 2021-02-10 DIAGNOSIS — Z95.0 PACEMAKER: Primary | ICD-10-CM

## 2021-02-10 PROCEDURE — 93294 REM INTERROG EVL PM/LDLS PM: CPT | Performed by: CLINICAL NURSE SPECIALIST

## 2021-02-10 PROCEDURE — 93296 REM INTERROG EVL PM/IDS: CPT | Performed by: CLINICAL NURSE SPECIALIST

## 2021-02-25 LAB
ALBUMIN SERPL-MCNC: 4.1 G/DL (ref 3.5–5.2)
ALP BLD-CCNC: 35 U/L (ref 40–130)
ALT SERPL-CCNC: 24 U/L (ref 5–41)
ANION GAP SERPL CALCULATED.3IONS-SCNC: 10 MMOL/L (ref 7–19)
AST SERPL-CCNC: 15 U/L (ref 5–40)
BILIRUB SERPL-MCNC: 0.6 MG/DL (ref 0.2–1.2)
BUN BLDV-MCNC: 27 MG/DL (ref 8–23)
CALCIUM SERPL-MCNC: 8.9 MG/DL (ref 8.8–10.2)
CHLORIDE BLD-SCNC: 106 MMOL/L (ref 98–111)
CO2: 26 MMOL/L (ref 22–29)
CREAT SERPL-MCNC: 1.3 MG/DL (ref 0.5–1.2)
GFR AFRICAN AMERICAN: >59
GFR NON-AFRICAN AMERICAN: 54
GLUCOSE BLD-MCNC: 124 MG/DL (ref 74–109)
HBA1C MFR BLD: 6.2 % (ref 4–6)
POTASSIUM SERPL-SCNC: 4.5 MMOL/L (ref 3.5–5)
PRO-BNP: 3694 PG/ML (ref 0–1800)
SODIUM BLD-SCNC: 142 MMOL/L (ref 136–145)
TOTAL PROTEIN: 7 G/DL (ref 6.6–8.7)

## 2021-03-15 DIAGNOSIS — Z95.0 PACEMAKER: Primary | ICD-10-CM

## 2021-03-15 DIAGNOSIS — I48.0 PAF (PAROXYSMAL ATRIAL FIBRILLATION) (HCC): ICD-10-CM

## 2021-03-15 DIAGNOSIS — I49.5 SICK SINUS SYNDROME (HCC): ICD-10-CM

## 2021-03-15 DIAGNOSIS — Z45.010 PACEMAKER BATTERY DEPLETION: ICD-10-CM

## 2021-03-15 PROCEDURE — 93294 REM INTERROG EVL PM/LDLS PM: CPT | Performed by: NURSE PRACTITIONER

## 2021-03-15 PROCEDURE — 93296 REM INTERROG EVL PM/IDS: CPT | Performed by: NURSE PRACTITIONER

## 2021-03-16 ENCOUNTER — OFFICE VISIT (OUTPATIENT)
Dept: CARDIOLOGY CLINIC | Age: 76
End: 2021-03-16
Payer: MEDICARE

## 2021-03-16 VITALS
SYSTOLIC BLOOD PRESSURE: 122 MMHG | DIASTOLIC BLOOD PRESSURE: 82 MMHG | BODY MASS INDEX: 31.32 KG/M2 | HEART RATE: 70 BPM | HEIGHT: 74 IN | WEIGHT: 244 LBS

## 2021-03-16 DIAGNOSIS — Z95.0 PACEMAKER: ICD-10-CM

## 2021-03-16 DIAGNOSIS — I25.10 CORONARY ARTERY DISEASE INVOLVING NATIVE CORONARY ARTERY OF NATIVE HEART WITHOUT ANGINA PECTORIS: ICD-10-CM

## 2021-03-16 DIAGNOSIS — Z45.010 PACEMAKER BATTERY DEPLETION: ICD-10-CM

## 2021-03-16 DIAGNOSIS — I48.0 PAF (PAROXYSMAL ATRIAL FIBRILLATION) (HCC): ICD-10-CM

## 2021-03-16 DIAGNOSIS — I49.5 SICK SINUS SYNDROME (HCC): Primary | ICD-10-CM

## 2021-03-16 PROCEDURE — G8427 DOCREV CUR MEDS BY ELIG CLIN: HCPCS | Performed by: CLINICAL NURSE SPECIALIST

## 2021-03-16 PROCEDURE — 93280 PM DEVICE PROGR EVAL DUAL: CPT | Performed by: CLINICAL NURSE SPECIALIST

## 2021-03-16 PROCEDURE — 1036F TOBACCO NON-USER: CPT | Performed by: CLINICAL NURSE SPECIALIST

## 2021-03-16 PROCEDURE — G8417 CALC BMI ABV UP PARAM F/U: HCPCS | Performed by: CLINICAL NURSE SPECIALIST

## 2021-03-16 PROCEDURE — 99214 OFFICE O/P EST MOD 30 MIN: CPT | Performed by: CLINICAL NURSE SPECIALIST

## 2021-03-16 PROCEDURE — G8484 FLU IMMUNIZE NO ADMIN: HCPCS | Performed by: CLINICAL NURSE SPECIALIST

## 2021-03-16 PROCEDURE — 1123F ACP DISCUSS/DSCN MKR DOCD: CPT | Performed by: CLINICAL NURSE SPECIALIST

## 2021-03-16 PROCEDURE — 4040F PNEUMOC VAC/ADMIN/RCVD: CPT | Performed by: CLINICAL NURSE SPECIALIST

## 2021-03-16 NOTE — PROGRESS NOTES
[facility] Cardiology  [facility] [address]  [address]  Phone: [phone]  Fax: [phone]    OFFICE VISIT:  3/16/2021    Andi Yepez - : 1945    Reason For Visit:  Elisa Mirza is a 67 y.o. male who is here for Follow-up (patient has soa), Atrial Fibrillation (pacemaker), and Coronary Artery Disease  History of nonocclusive coronary disease with last heart catheterization in 2018 EF 45%. Pacemaker placed in  for sick sinus syndrome with paroxysmal atrial fibrillation controlled on Rythmol  Been watching device for battery status. Patient reached IRVING on 2/15/2021  With that mode switched from AAIR-DDDR over to VVIR to preserve battery function    Patient has been worsening RITTER for the last several months but seem to get much worse over the last 3 weeks. Patient is here to have pacemaker no change in schedule generator replacement  He states he is scheduled to have a 2D echo on Thursday. This was ordered by his PCP for worsening shortness of breath with activity      Subjective  Glendal denies exertional chest pain, resting shortness of breath, orthopnea, paroxysmal nocturnal dyspnea, syncope, presyncope, arrhythmia, edema and fatigue. The patient denies numbness or weakness to suggest cerebrovascular accident or transient ischemic attack. Jet Montalvo MD is PCP and follows labs .   Andi Yepez has the following history as recorded in EpicCare:    Patient Active Problem List    Diagnosis Date Noted    Non-ischemic cardiomyopathy [facility])      Priority: High    Renal mass, right 2020    Dizziness 2018    RITTER (dyspnea on exertion) 2018    History of adenomatous polyp of colon 03/10/2016    Heme positive stool 2014    Rectal bleeding 2014    CAD (coronary artery disease) 2012    PAF (paroxysmal atrial fibrillation) ([facility] 10/30/2012    Hypertension     Pacemaker 2012    Sick sinus syndrome ([facility] 2012    Near syncope 06/10/2012 Past Medical History:   Diagnosis Date    CAD (coronary artery disease) 2012    Colon polyp     Diabetes mellitus ([facility]     GERD (gastroesophageal reflux disease)     Hyperlipidemia     Hypertension     Near syncope 6/10/2012    Pacemaker 12    PAF (paroxysmal atrial fibrillation) ([facility] 10/30/2012     Past Surgical History:   Procedure Laterality Date    APPENDECTOMY      CARDIAC CATHETERIZATION  12   JDT    EF  60%  with angioplasty    CHOLECYSTECTOMY, LAPAROSCOPIC  11/2/15    COLONOSCOPY  2015    Nancy: mult polyps, 5 were tubular adenomas    COLONOSCOPY N/A 3/10/2016    Dr Henry snf AP x 3, dysplasia (-), HP x 1, 3 yr recall    COLONOSCOPY N/A 8/15/2019    Dr Jimbo Bahena hemorrhoids-Grade 2, diverticulosis, AP-5 yr recall    EYE SURGERY Bilateral     CATARACT    FRACTURE SURGERY      Nose    KIDNEY SURGERY      PACEMAKER PLACEMENT       Family History   Problem Relation Age of Onset    Heart Failure Mother          at age 80    Heart Disease Maternal Uncle     Colon Cancer Neg Hx     Colon Polyps Neg Hx     Esophageal Cancer Neg Hx     Liver Disease Neg Hx     Liver Cancer Neg Hx     Rectal Cancer Neg Hx     Stomach Cancer Neg Hx      Social History     Tobacco Use    Smoking status: Former Smoker     Packs/day: 0.00     Types: Cigarettes    Smokeless tobacco: Never Used   Substance Use Topics    Alcohol use: No      Current Outpatient Medications   Medication Sig Dispense Refill    amLODIPine (NORVASC) 5 MG tablet Take 1 tablet by mouth daily 90 tablet 3    metoprolol tartrate (LOPRESSOR) 25 MG tablet TAKE ONE-HALF TABLET BY  MOUTH TWICE A DAY 90 tablet 3    propafenone (RYTHMOL) 150 MG tablet TAKE 1 TABLET BY MOUTH  EVERY 8 HOURS 270 tablet 3    famotidine (PEPCID) 20 MG tablet Take 20 mg by mouth every evening      losartan (COZAAR) 100 MG tablet Take 0.5 tablet twice daily      metFORMIN (GLUCOPHAGE) 500 MG tablet Take 500 mg by mouth 2 times daily (with meals)      aspirin 81 MG tablet Take 81 mg by mouth daily. No current facility-administered medications for this visit. Allergies: Lisinopril    Review of Systems  Constitutional - no significant activity change, appetite change, or unexpected weight change. No fever, chills or diaphoresis. No fatigue. HEENT - no significant rhinorrhea or epistaxis. No tinnitus or significant hearing loss. Eyes - no sudden vision change or amaurosis. Respiratory - no significant wheezing, stridor, apnea or cough. + dyspnea on exertion  No resting  shortness of breath. Cardiovascular - no exertional chest pain, orthopnea or PND. No sensation of arrhythmia or slow heart rate. No claudication or leg edema. Gastrointestinal - no abdominal swelling or pain. No blood in stool. No severe constipation, diarrhea, nausea, or vomiting. Genitourinary - no difficulty urinating, dysuria, frequency, or urgency. No flank pain or hematuria. Musculoskeletal - no back pain, gait disturbance, or myalgia. Skin - no color change or rash. No pallor. No new surgical incision. Neurologic - no speech difficulty, facial asymmetry or lateralizing weakness. No seizures, presyncope, syncope, or significant dizziness. Hematologic - no easy bruising or excessive bleeding. Psychiatric - no severe anxiety or insomnia. No confusion. All other review of systems are negative. Objective  Vital Signs - /82   Pulse 70   Ht 6' 2\" (1.88 m)   Wt 244 lb (110.7 kg)   BMI 31.33 kg/m²   General - Glendal is alert, cooperative, and pleasant. Well groomed. No acute distress. Body habitus is overweight. HEENT - The head is normocephalic. No circumoral cyanosis. Dentition is normal.   EYES -  No Xanthelasma, no arcus senilis, no conjunctival hemorrhages or discharge. Neck - Supple, without increased jugular venous pressures. No carotid bruits. No mass.    Respiratory - Lungs are clear

## 2021-03-18 ENCOUNTER — HOSPITAL ENCOUNTER (OUTPATIENT)
Dept: NON INVASIVE DIAGNOSTICS | Age: 76
Discharge: HOME | End: 2021-03-18
Payer: MEDICARE

## 2021-03-18 DIAGNOSIS — I42.8 OTHER CARDIOMYOPATHIES (HCC): ICD-10-CM

## 2021-03-18 LAB
LV EF: 55 %
LVEF MODALITY: NORMAL

## 2021-03-18 PROCEDURE — 93306 TTE W/DOPPLER COMPLETE: CPT

## 2021-03-20 ENCOUNTER — OFFICE VISIT (OUTPATIENT)
Age: 76
End: 2021-03-20

## 2021-03-20 DIAGNOSIS — Z11.59 SCREENING FOR VIRAL DISEASE: Primary | ICD-10-CM

## 2021-03-20 PROCEDURE — 99999 PR OFFICE/OUTPT VISIT,PROCEDURE ONLY: CPT | Performed by: NURSE PRACTITIONER

## 2021-03-21 LAB — SARS-COV-2, PCR: NOT DETECTED

## 2021-03-25 ENCOUNTER — HOSPITAL ENCOUNTER (OUTPATIENT)
Dept: CARDIAC CATH/INVASIVE PROCEDURES | Age: 76
Discharge: HOME | End: 2021-03-25
Attending: INTERNAL MEDICINE | Admitting: INTERNAL MEDICINE
Payer: MEDICARE

## 2021-03-25 VITALS
WEIGHT: 240 LBS | BODY MASS INDEX: 30.8 KG/M2 | RESPIRATION RATE: 21 BRPM | TEMPERATURE: 96.9 F | DIASTOLIC BLOOD PRESSURE: 103 MMHG | OXYGEN SATURATION: 94 % | SYSTOLIC BLOOD PRESSURE: 149 MMHG | HEART RATE: 61 BPM | HEIGHT: 74 IN

## 2021-03-25 LAB
ANION GAP SERPL CALCULATED.3IONS-SCNC: 8 MMOL/L (ref 7–19)
BASOPHILS ABSOLUTE: 0.1 K/UL (ref 0–0.2)
BASOPHILS RELATIVE PERCENT: 0.8 % (ref 0–1)
BUN BLDV-MCNC: 23 MG/DL (ref 8–23)
CALCIUM SERPL-MCNC: 8.9 MG/DL (ref 8.8–10.2)
CHLORIDE BLD-SCNC: 106 MMOL/L (ref 98–111)
CO2: 26 MMOL/L (ref 22–29)
CREAT SERPL-MCNC: 1.2 MG/DL (ref 0.5–1.2)
EOSINOPHILS ABSOLUTE: 0.2 K/UL (ref 0–0.6)
EOSINOPHILS RELATIVE PERCENT: 3.2 % (ref 0–5)
GFR AFRICAN AMERICAN: >59
GFR NON-AFRICAN AMERICAN: 59
GLUCOSE BLD-MCNC: 105 MG/DL (ref 74–109)
HCT VFR BLD CALC: 44.4 % (ref 42–52)
HEMOGLOBIN: 14.3 G/DL (ref 14–18)
IMMATURE GRANULOCYTES #: 0 K/UL
LYMPHOCYTES ABSOLUTE: 1.8 K/UL (ref 1.1–4.5)
LYMPHOCYTES RELATIVE PERCENT: 29.6 % (ref 20–40)
MCH RBC QN AUTO: 28.3 PG (ref 27–31)
MCHC RBC AUTO-ENTMCNC: 32.2 G/DL (ref 33–37)
MCV RBC AUTO: 87.7 FL (ref 80–94)
MONOCYTES ABSOLUTE: 0.4 K/UL (ref 0–0.9)
MONOCYTES RELATIVE PERCENT: 6.2 % (ref 0–10)
NEUTROPHILS ABSOLUTE: 3.6 K/UL (ref 1.5–7.5)
NEUTROPHILS RELATIVE PERCENT: 60 % (ref 50–65)
PDW BLD-RTO: 14.9 % (ref 11.5–14.5)
PLATELET # BLD: 176 K/UL (ref 130–400)
PMV BLD AUTO: 11.5 FL (ref 9.4–12.4)
POTASSIUM REFLEX MAGNESIUM: 5 MMOL/L (ref 3.5–5)
RBC # BLD: 5.06 M/UL (ref 4.7–6.1)
SODIUM BLD-SCNC: 140 MMOL/L (ref 136–145)
WBC # BLD: 5.9 K/UL (ref 4.8–10.8)

## 2021-03-25 PROCEDURE — 2500000003 HC RX 250 WO HCPCS

## 2021-03-25 PROCEDURE — 2780000010 HC IMPLANT OTHER

## 2021-03-25 PROCEDURE — 36415 COLL VENOUS BLD VENIPUNCTURE: CPT

## 2021-03-25 PROCEDURE — 85025 COMPLETE CBC W/AUTO DIFF WBC: CPT

## 2021-03-25 PROCEDURE — 6370000000 HC RX 637 (ALT 250 FOR IP): Performed by: INTERNAL MEDICINE

## 2021-03-25 PROCEDURE — 2709999900 HC NON-CHARGEABLE SUPPLY

## 2021-03-25 PROCEDURE — 2720000010 HC SURG SUPPLY STERILE

## 2021-03-25 PROCEDURE — 99152 MOD SED SAME PHYS/QHP 5/>YRS: CPT | Performed by: INTERNAL MEDICINE

## 2021-03-25 PROCEDURE — 99152 MOD SED SAME PHYS/QHP 5/>YRS: CPT

## 2021-03-25 PROCEDURE — C1785 PMKR, DUAL, RATE-RESP: HCPCS

## 2021-03-25 PROCEDURE — 80048 BASIC METABOLIC PNL TOTAL CA: CPT

## 2021-03-25 PROCEDURE — 93005 ELECTROCARDIOGRAM TRACING: CPT | Performed by: INTERNAL MEDICINE

## 2021-03-25 PROCEDURE — 2580000003 HC RX 258: Performed by: INTERNAL MEDICINE

## 2021-03-25 PROCEDURE — 33228 REMV&REPLC PM GEN DUAL LEAD: CPT | Performed by: INTERNAL MEDICINE

## 2021-03-25 PROCEDURE — 99153 MOD SED SAME PHYS/QHP EA: CPT

## 2021-03-25 PROCEDURE — 6360000002 HC RX W HCPCS

## 2021-03-25 PROCEDURE — 33228 REMV&REPLC PM GEN DUAL LEAD: CPT

## 2021-03-25 RX ORDER — SODIUM CHLORIDE 0.9 % (FLUSH) 0.9 %
10 SYRINGE (ML) INJECTION PRN
Status: CANCELLED | OUTPATIENT
Start: 2021-03-25

## 2021-03-25 RX ORDER — SODIUM CHLORIDE 0.9 % (FLUSH) 0.9 %
10 SYRINGE (ML) INJECTION PRN
Status: DISCONTINUED | OUTPATIENT
Start: 2021-03-25 | End: 2021-03-25 | Stop reason: HOSPADM

## 2021-03-25 RX ORDER — SODIUM CHLORIDE 0.9 % (FLUSH) 0.9 %
10 SYRINGE (ML) INJECTION EVERY 12 HOURS SCHEDULED
Status: CANCELLED | OUTPATIENT
Start: 2021-03-25

## 2021-03-25 RX ORDER — CHLORHEXIDINE GLUCONATE 4 G/100ML
SOLUTION TOPICAL ONCE
Status: COMPLETED | OUTPATIENT
Start: 2021-03-25 | End: 2021-03-25

## 2021-03-25 RX ORDER — SODIUM CHLORIDE 9 MG/ML
INJECTION, SOLUTION INTRAVENOUS CONTINUOUS
Status: DISCONTINUED | OUTPATIENT
Start: 2021-03-25 | End: 2021-03-25 | Stop reason: HOSPADM

## 2021-03-25 RX ORDER — SODIUM CHLORIDE 0.9 % (FLUSH) 0.9 %
10 SYRINGE (ML) INJECTION EVERY 12 HOURS SCHEDULED
Status: DISCONTINUED | OUTPATIENT
Start: 2021-03-25 | End: 2021-03-25 | Stop reason: HOSPADM

## 2021-03-25 RX ADMIN — SODIUM CHLORIDE: 9 INJECTION, SOLUTION INTRAVENOUS at 13:29

## 2021-03-25 RX ADMIN — CHLORHEXIDINE GLUCONATE: 213 SOLUTION TOPICAL at 13:30

## 2021-03-25 ASSESSMENT — ENCOUNTER SYMPTOMS
BACK PAIN: 0
VOMITING: 0
COUGH: 0
DIARRHEA: 0
ABDOMINAL DISTENTION: 0
ABDOMINAL PAIN: 0
BLOOD IN STOOL: 0
SHORTNESS OF BREATH: 1
WHEEZING: 0

## 2021-03-25 NOTE — OP NOTE
Operative Note      Patient: Azul Iniguez  YOB: 1945  MRN: [id number]    Date of Procedure: 3/25/2021    Pre-Op Diagnosis: Sick sinus syndrome with pacemaker at [facility]    Post-Op Diagnosis: Same       Procedure: Pacemaker generator change    : Willis Farrell MD    Anesthesia: Moderate conscious sedation  Anesthesia: Lidocaine LA  Sedation: Versed 1 mg; Fentanyl 50 mg  Start time: 172  Stop time: 174  ASA Class: 3  Estimated blood loss: Less than 20 mL  An independent trained observer pushed medications at my direction. The patient was monitored continuously with the ECG, pulse oximetry, blood pressure monitoring, and direct observation for level of consciousness. Complications: None    Detailed Description of Procedure:     After obtaining informed written consent, the patient was brought to the catheterization laboratory where the left chest area was prepared in the usual sterile fashion. The patient was monitored continuously with ECG, pulse oximetry, blood pressure monitoring and direct observation. Additionally, please review the \"Marlee Hemodynamic Procedure Report\", which is generated electronically through the [employer]. This report includes additional details regarding this procedure including, but not limited to:     1. Patient Data,   2. Admission,   3. Procedure,   4. Hemodynamics,   5. Vital Signs,   6. Medications, including conscious sedation medications given during the   procedure (as note above),   7. Procedure Log,   8. Device Usage,   9. Signature Audit [place], and,   10. Signatures. It should be noted, that I sign the \"Signatures\" line electronically through the \"HPF Def Portals\" tab on my computer. The 's hands were scrubbed in a betadine solution for 5 minutes. Utilizing local anesthesia and the plasma blade, the pacemaker pocket was entered. The generator was removed from the pocket and the pocket was revised.   The leads were freed up from the floor of the pocket. The thresholds of the leads were tested and found to be appropriate. The pacemaker pocket was copiously irrigated with antibiotic solution. The leads were attached to the generator, coiled in the pocket, and the subcutaneous and cutaneous tissues were approximated, and a sterile dressing applied. He was then taken to his room in stable and satisfactory condition. Complications:  none      Technical Information:       Model # Serial # Implant Date   Left Ventricle      Right Atrial Lead (Medtronic)  5086 MRI 45 [id number] [id number]  6/11/2012   Right Ventricular Lead (Medtronic)  5086 MRI 52  [id number] [id number]  6/11/2012          Pulse Width (ms) Voltage (V) Current (mA) Impedance (Ohms) P / R Wave (mV)    Left Ventricle        Right Atrial Lead 0.4  1.0   418  2.4   Right Ventricular Lead 0.4  0.5   627  12.1         Generator Product Name - Removed Product # Serial # Implant Date:         REVO MRI SURESCAN US MKT IS-1  RV  01  PTN [id number]  6/11/2012       Generator Product Name - New Model # Serial # Implant Date         ADINA XT  MRI [id number] RNB [id number] 3/25/21         IMPRESSION:    1. Successful pacemaker generator removal  2. Successful pacemaker generator replacement  3. Successful pacemaker lead threshold testing  4. Successful pacemaker pocket revision  5.   Supervision of the administration of moderate conscious sedation       Electronically signed by Loretta Mendoza MD on 3/25/21        Electronically signed by Loretta Mendoza MD on 3/25/2021 at 5:53 PM

## 2021-03-25 NOTE — H&P
fatigue. HENT: Negative for hearing loss, nosebleeds and tinnitus. Eyes: Negative for visual disturbance. Respiratory: Positive for shortness of breath. Negative for cough and wheezing. Cardiovascular: Negative for chest pain, palpitations and leg swelling. Gastrointestinal: Negative for abdominal distention, abdominal pain, blood in stool, diarrhea and vomiting. Endocrine: Negative for cold intolerance, heat intolerance, polydipsia, polyphagia and polyuria. Genitourinary: Negative for difficulty urinating, flank pain and hematuria. Musculoskeletal: Negative for arthralgias, back pain, joint swelling and myalgias. Skin: Negative for pallor and rash. Neurological: Negative for dizziness, seizures, syncope and headaches. Psychiatric/Behavioral: Negative for behavioral problems and dysphoric mood. The patient is not nervous/anxious. Past Medical History:      Diagnosis Date    CAD (coronary artery disease) 12/6/2012    Colon polyp     Diabetes mellitus ([facility]     GERD (gastroesophageal reflux disease)     Hyperlipidemia     Hypertension     Near syncope 6/10/2012    Pacemaker 6/11/12    PAF (paroxysmal atrial fibrillation) ([facility] 10/30/2012       Past Surgical History:      Procedure Laterality Date    APPENDECTOMY      CARDIAC CATHETERIZATION  12/6/12   JDT    EF  60%  with angioplasty    CHOLECYSTECTOMY, LAPAROSCOPIC  11/2/15    COLONOSCOPY  1/2015    Nancy: mult polyps, 5 were tubular adenomas    COLONOSCOPY N/A 3/10/2016    Dr Deepthi Pitts AP x 3, dysplasia (-), HP x 1, 3 yr recall    COLONOSCOPY N/A 8/15/2019    Dr Franchester Bence hemorrhoids-Grade 2, diverticulosis, AP-5 yr recall    EYE SURGERY Bilateral     CATARACT    FRACTURE SURGERY      Nose    KIDNEY SURGERY      PACEMAKER PLACEMENT         Medications Prior to Admission:    Prior to Admission medications    Medication Sig Start Date End Date Taking?  Authorizing Provider   amLODIPine (NORVASC) 5 MG tablet Take 1 tablet by mouth daily 12/28/20   LESA Olivas   metoprolol tartrate (LOPRESSOR) 25 MG tablet TAKE ONE-HALF TABLET BY  MOUTH TWICE A DAY 7/16/20   LESA Shaw CNP   propafenone (RYTHMOL) 150 MG tablet TAKE 1 TABLET BY MOUTH  EVERY 8 HOURS 7/16/20   LESA Shaw CNP   famotidine (PEPCID) 20 MG tablet Take 20 mg by mouth every evening    Historical Provider, MD   losartan (COZAAR) 100 MG tablet Take 0.5 tablet twice daily    Historical Provider, MD   metFORMIN (GLUCOPHAGE) 500 MG tablet Take 500 mg by mouth 2 times daily (with meals)    Historical Provider, MD   aspirin 81 MG tablet Take 81 mg by mouth daily.     Historical Provider, MD       Allergies:  Lisinopril    Past Social History:  Social History     Socioeconomic History    Marital status:      Spouse name: Not on file    Number of children: Not on file    Years of education: Not on file    Highest education level: Not on file   Occupational History    Not on file   Social Needs    Financial resource strain: Not on file    Food insecurity     Worry: Not on file     Inability: Not on file    Transportation needs     Medical: Not on file     Non-medical: Not on file   Tobacco Use    Smoking status: Former Smoker     Packs/day: 0.00     Types: Cigarettes    Smokeless tobacco: Never Used   Substance and Sexual Activity    Alcohol use: No    Drug use: No    Sexual activity: Not on file   Lifestyle    Physical activity     Days per week: Not on file     Minutes per session: Not on file    Stress: Not on file   Relationships    Social connections     Talks on phone: Not on file     Gets together: Not on file     Attends Jewish service: Not on file     Active member of club or organization: Not on file     Attends meetings of clubs or organizations: Not on file     Relationship status: Not on file    Intimate partner violence     Fear of current or ex partner: Not on file     Emotionally abused: Not on file     Physically abused: Not on file     Forced sexual activity: Not on file   Other Topics Concern    Not on file   Social History Narrative    Not on file       Family History:       Problem Relation Age of Onset    Heart Failure Mother          at age 80    Heart Disease Maternal Uncle     Colon Cancer Neg Hx     Colon Polyps Neg Hx     Esophageal Cancer Neg Hx     Liver Disease Neg Hx     Liver Cancer Neg Hx     Rectal Cancer Neg Hx     Stomach Cancer Neg Hx      Physical Exam:    Vitals: There were no vitals taken for this visit. 24HR INTAKE/OUTPUT:  No intake or output data in the 24 hours ending 21 1023    Physical Exam  Constitutional:       Appearance: He is well-developed. HENT:      Mouth/Throat:      Pharynx: No oropharyngeal exudate. Eyes:      General: No scleral icterus. Right eye: No discharge. Left eye: No discharge. Neck:      Thyroid: No thyromegaly. Vascular: No JVD. Cardiovascular:      Rate and Rhythm: Normal rate and regular rhythm. Heart sounds: No murmur. No friction rub. No gallop. Pulmonary:      Effort: No respiratory distress. Breath sounds: No stridor. No wheezing or rales. Abdominal:      General: Bowel sounds are normal. There is no distension. Palpations: Abdomen is soft. There is no mass. Tenderness: There is no abdominal tenderness. There is no guarding or rebound. Musculoskeletal:         General: No deformity. Skin:     General: Skin is warm. Coloration: Skin is not pale. Findings: No erythema or rash. Neurological:      Mental Status: He is alert and oriented to person, place, and time. Motor: No abnormal muscle tone. Coordination: Coordination normal.      Deep Tendon Reflexes: Reflexes normal.         LAB DATA:  CBC: No results for input(s): WBC, HGB, PLT in the last 72 hours.   BMP:  No results for input(s): NA, K, CL, CO2, BUN, CREATININE, GLUCOSE in the last 72 hours. Hepatic: No results for input(s): AST, ALT, ALB, BILITOT, ALKPHOS in the last 72 hours. CK, CKMB, Troponin: @LABRCNT (CKTOTAL:3, CKMB:3, TROPONINI:3)@  Pro-BNP: No results for input(s): BNP in the last 72 hours. Lipids: No results for input(s): CHOL, HDL in the last 72 hours. Invalid input(s): LDL  ABGs: No results for input(s): PHART, QYK4UXQ, PO2ART, MTN3LLW, BEART, HGBAE, U2FLOXVL, CARBOXHGBART, 02THERAPY in the last 72 hours. INR: No results for input(s): INR in the last 72 hours. A1c:Invalid input(s): HEMOGLOBIN A1C  URINALYSIS:   Lab Results   Component Value Date    NITRU Negative 03/09/2020    BACTERIA 2+ 02/24/2020    RBCUA 0 02/24/2020    BLOODU Positive 03/09/2020    SPECGRAV 1.015 03/09/2020    GLUCOSEU neg 03/09/2020     -----------------------------------------------------------------  IMAGING:  No orders to display         Assessment and Recommendations: This is a 67y.o. year old male with past medical history of paroxysmal atrial fibrillation, prior pacemaker placement 2012, on Rythmol, not on anticoagulation, patent coronary arteries with normal LV systolic function here for generator change with pacemaker at [facility]. Risks, benefits, alternatives of pacemaker generator change discussed with the patient and full informed consent obtained.   Acceptable Mallampati score  Consent for moderate conscious sedation  ASA 3            Electronically signed by Carlos Abbott MD on 3/25/2021 at 10:23 AM

## 2021-03-26 LAB
EKG P AXIS: 53 DEGREES
EKG P-R INTERVAL: 280 MS
EKG Q-T INTERVAL: 426 MS
EKG QRS DURATION: 138 MS
EKG QTC CALCULATION (BAZETT): 440 MS
EKG T AXIS: 38 DEGREES

## 2021-03-26 PROCEDURE — 93010 ELECTROCARDIOGRAM REPORT: CPT | Performed by: INTERNAL MEDICINE

## 2021-03-26 NOTE — FLOWSHEET NOTE
Pt waited several hours to go to cath lab for Pacemaker gen change. Was taken to cath lab per Aldo Avendano RN. Returned at 18:00 with left subcl site clear of bleeding with new generator in place and dressing clear of bleeding. Connected to BSMonitor with Atrial pacing noted on monitor. Pt alert and cooperative. C/o being hungry. A turkey sandwich and 7-UP was served to patient. Wife at [facility]. Pt recovered without problems. Discharge instructions were explained to patient with voiced understanding, and the Pacemaker booklet and pamphlet was given to patient to take home and put in safe place.

## 2021-04-09 ENCOUNTER — NURSE ONLY (OUTPATIENT)
Dept: CARDIOLOGY CLINIC | Age: 76
End: 2021-04-09

## 2021-04-09 DIAGNOSIS — Z51.89 VISIT FOR WOUND CHECK: Primary | ICD-10-CM

## 2021-04-09 NOTE — PROGRESS NOTES
Patient here for a wound check visit status post Pacemaker implant. Incision dry and intact. No redness, swelling, or drainage noted  Edges well approximated  Instructed patient to wash area with antibacterial soap and keep it clean and dry. Reviewed discharged instructions and questions answered.   Reviewed [facility] home monitor and patient verbalized understanding  Follow up as scheduled

## 2021-04-14 DIAGNOSIS — I49.5 SICK SINUS SYNDROME (HCC): ICD-10-CM

## 2021-04-14 DIAGNOSIS — Z95.0 PACEMAKER: Primary | ICD-10-CM

## 2021-05-06 ENCOUNTER — TELEPHONE (OUTPATIENT)
Dept: CARDIOLOGY CLINIC | Age: 76
End: 2021-05-06

## 2021-05-06 ENCOUNTER — OFFICE VISIT (OUTPATIENT)
Dept: CARDIOLOGY CLINIC | Age: 76
End: 2021-05-06
Payer: MEDICARE

## 2021-05-06 VITALS
HEART RATE: 76 BPM | DIASTOLIC BLOOD PRESSURE: 68 MMHG | BODY MASS INDEX: 30.8 KG/M2 | SYSTOLIC BLOOD PRESSURE: 106 MMHG | WEIGHT: 240 LBS | HEIGHT: 74 IN

## 2021-05-06 DIAGNOSIS — I25.10 CORONARY ARTERY DISEASE INVOLVING NATIVE CORONARY ARTERY OF NATIVE HEART WITHOUT ANGINA PECTORIS: ICD-10-CM

## 2021-05-06 DIAGNOSIS — I48.0 PAF (PAROXYSMAL ATRIAL FIBRILLATION) (HCC): ICD-10-CM

## 2021-05-06 DIAGNOSIS — I10 ESSENTIAL HYPERTENSION: ICD-10-CM

## 2021-05-06 DIAGNOSIS — I49.5 SICK SINUS SYNDROME (HCC): Primary | ICD-10-CM

## 2021-05-06 DIAGNOSIS — Z95.0 PACEMAKER: ICD-10-CM

## 2021-05-06 PROCEDURE — 99024 POSTOP FOLLOW-UP VISIT: CPT | Performed by: CLINICAL NURSE SPECIALIST

## 2021-05-06 PROCEDURE — 93280 PM DEVICE PROGR EVAL DUAL: CPT | Performed by: CLINICAL NURSE SPECIALIST

## 2021-05-06 ASSESSMENT — ENCOUNTER SYMPTOMS
COUGH: 0
NAUSEA: 0
FACIAL SWELLING: 0
WHEEZING: 0
ABDOMINAL PAIN: 0
SHORTNESS OF BREATH: 0
EYE REDNESS: 0
VOMITING: 0
CHEST TIGHTNESS: 0

## 2021-05-06 NOTE — TELEPHONE ENCOUNTER
Per sofie patient given 4 boxes of eliquis 5mg. I did not have a 30-day card to give him. I informed him to call back and check with us before his samples run out and check for a card or more boxes of samples until we know if patient assistance is needed or not.

## 2021-05-06 NOTE — PATIENT INSTRUCTIONS
Return in about 6 months (around 11/6/2021) for APRN. Start Eliquis 5mg twice a day  Check carelink in 3 mo      Patient Education        Atrial Fibrillation: Care Instructions  Your Care Instructions     Atrial fibrillation is an irregular and often fast heartbeat. Treating this condition is important for several reasons. It can cause blood clots, which can travel from your heart to your brain and cause a stroke. If you have a fast heartbeat, you may feel lightheaded, dizzy, and weak. An irregular heartbeat can also increase your risk for heart failure. Atrial fibrillation is often the result of another heart condition, such as high blood pressure or coronary artery disease. Making changes to improve your heart condition will help you stay healthy and active. Follow-up care is a key part of your treatment and safety. Be sure to make and go to all appointments, and call your doctor if you are having problems. It's also a good idea to know your test results and keep a list of the medicines you take. How can you care for yourself at home? Medicines    · Take your medicines exactly as prescribed. Call your doctor if you think you are having a problem with your medicine. You will get more details on the specific medicines your doctor prescribes.     · If your doctor has given you a blood thinner to prevent a stroke, be sure you get instructions about how to take your medicine safely. Blood thinners can cause serious bleeding problems.     · Do not take any vitamins, over-the-counter drugs, or herbal products without talking to your doctor first.   Lifestyle changes    · Do not smoke. Smoking can increase your chance of a stroke and heart attack. If you need help quitting, talk to your doctor about stop-smoking programs and medicines. These can increase your chances of quitting for good.     · Eat a heart-healthy diet.     · Stay at a healthy weight.  Lose weight if you need to.     · Limit alcohol to 2 drinks a day for men and 1 drink a day for women. Too much alcohol can cause health problems.     · Avoid colds and flu. Get a pneumococcal vaccine shot. If you have had one before, ask your doctor whether you need another dose. Get a flu shot every year. If you must be around people with colds or flu, wash your hands often. Activity    · If your doctor recommends it, get more exercise. Walking is a good choice. Bit by bit, increase the amount you walk every day. Try for at least 30 minutes on most days of the week. You also may want to swim, bike, or do other activities. Your doctor may suggest that you join a cardiac rehabilitation program so that you can have help increasing your physical activity safely.     · Start light exercise if your doctor says it is okay. Even a small amount will help you get stronger, have more energy, and manage stress. Walking is an easy way to get exercise. Start out by walking a little more than you did in the hospital. Gradually increase the amount you walk.     · When you exercise, watch for signs that your heart is working too hard. You are pushing too hard if you cannot talk while you are exercising. If you become short of breath or dizzy or have chest pain, sit down and rest immediately.     · Check your pulse regularly. Place two fingers on the artery at the palm side of your wrist, in line with your thumb. If your heartbeat seems uneven or fast, talk to your doctor. When should you call for help? Call 911 anytime you think you may need emergency care. For example, call if:    · You have symptoms of a heart attack. These may include:  ? Chest pain or pressure, or a strange feeling in the chest.  ? Sweating. ? Shortness of breath. ? Nausea or vomiting. ? Pain, pressure, or a strange feeling in the back, neck, jaw, or upper belly or in one or both shoulders or arms. ? Lightheadedness or sudden weakness. ? A fast or irregular heartbeat.   After you call 911, the  may tell you to chew 1 adult-strength or 2 to 4 low-dose aspirin. Wait for an ambulance. Do not try to drive yourself.     · You have symptoms of a stroke. These may include:  ? Sudden numbness, tingling, weakness, or loss of movement in your face, arm, or leg, especially on only one side of your body. ? Sudden vision changes. ? Sudden trouble speaking. ? Sudden confusion or trouble understanding simple statements. ? Sudden problems with walking or balance. ? A sudden, severe headache that is different from past headaches.     · You passed out (lost consciousness). Call your doctor now or seek immediate medical care if:    · You have new or increased shortness of breath.     · You feel dizzy or lightheaded, or you feel like you may faint.     · Your heart rate becomes irregular.     · You can feel your heart flutter in your chest or skip heartbeats. Tell your doctor if these symptoms are new or worse. Watch closely for changes in your health, and be sure to contact your doctor if you have any problems. Where can you learn more? Go to [email or web address] org and sign in to your [employer] account. Enter U020 in the iPipeline box to learn more about \"Atrial Fibrillation: Care Instructions. \"     If you do not have an account, please click on the \"Sign Up Now\" link. Current as of: August 31, 2020               Content Version: 12.8  © 9780-2353 [employer]wise, Incorporated. Care instructions adapted under license by [facility] ([facility]). If you have questions about a medical condition or this instruction, always ask your healthcare professional. [place] [address] any warranty or liability for your use of this information.

## 2021-05-06 NOTE — PROGRESS NOTES
[facility] [address]  [address]  Phone: [phone]  Fax: [phone]    OFFICE VISIT:  2021    Usha Mckeon - : 1945    Reason For Visit:  Marcia Peterson is a 67 y.o. male who is here for Follow-up (No cardiac sx today to discuss), Hypertension, and Coronary Artery Disease       Diagnosis Orders   1. Sick sinus syndrome ([address]     2. Pacemaker     3. PAF (paroxysmal atrial fibrillation) ([address]     4. Coronary artery disease involving native coronary artery of native heart without angina pectoris     5. Essential hypertension           HPI  Patient is here for follow-up status post pacemaker generator change. He has been having more paroxysmal atrial fibrillation recently and is currently not anticoagulated. He is asymptomatic PAF    His device site is well-healed without sign of infection. He denies chest pain, unusual dyspnea, orthopnea, PND, edema, palpitations     Boby Samuel MD is PCP.   Usha Mckeon has the following history as recorded in [facility]:    Patient Active Problem List    Diagnosis Date Noted    Non-ischemic cardiomyopathy [facility])      Priority: High    Renal mass, right 2020    Dizziness 2018    RITTER (dyspnea on exertion) 2018    History of adenomatous polyp of colon 03/10/2016    Heme positive stool 2014    Rectal bleeding 2014    CAD (coronary artery disease) 2012    PAF (paroxysmal atrial fibrillation) ([address] 10/30/2012    Hypertension     Pacemaker 2012    Sick sinus syndrome ([address] 2012    Near syncope 06/10/2012     Past Medical History:   Diagnosis Date    CAD (coronary artery disease) 2012    Colon polyp     Diabetes mellitus ([address]     GERD (gastroesophageal reflux disease)     Hyperlipidemia     Hypertension     Near syncope 6/10/2012    Pacemaker 12    PAF (paroxysmal atrial fibrillation) ([address] 10/30/2012     Past Surgical History:   Procedure Tenderness: There is no abdominal tenderness. Musculoskeletal:         General: No swelling or deformity. Right lower leg: No edema. Left lower leg: No edema. Comments: Normal gait and station   Skin:     General: Skin is warm and dry. Findings: No rash. Comments: Left chest pacemaker site well-healed without sign of infection   Neurological:      General: No focal deficit present. Mental Status: He is alert and oriented to person, place, and time. Cranial Nerves: No cranial nerve deficit.    Psychiatric:         Mood and Affect: Mood normal.         Behavior: Behavior normal.         Judgment: Judgment normal.         Data:   Echo 3/18/21  Conclusions      Summary   Normal left ventricular size and systolic function EF approximately 55%   Mild concentric left ventricular hypertrophy with transmitral and tissue   Dopplers consistent with normal diastolic function   Mild to moderate left atrial enlargement   Tricuspid aortic valve with adequate cusp separation and neither stenosis   or insufficiency   Normally mobile mitral valve with no demonstrable regurgitation   Nonvisualization of the pulmonic valve   Normal right-sided chambers with preserved RV systolic function   Mild tricuspid regurgitation with RVSP estimate 33 mmHg   Normal IVC consistent with normal right atrial filling pressures   No significant pericardial effusion   Dilated ascending aorta measuring 3.6 cm   Pacer or ICD lead seen traversing right sided chambers     [id number] Score for Atrial Fibrillation Stroke Risk   Risk   Factors  Component Value   C CHF No 0   H HTN Yes 1   A2 Age >= 76 Yes,  (76 y.o.) 2   D DM No 0   S2 Prior Stroke/TIA No 0   V Vascular Disease No 0   A Age 74-69 No,  (76 y.o.) 0   Sc Sex male 0    [id number]  Score  3   Score last updated 5/6/21 3:08 PM CDT    Click here for a link to the UpToDate guideline \"Atrial Fibrillation: Anticoagulation therapy to prevent embolization    Disclaimer: Risk Score calculation is dependent on accuracy of patient problem list and past encounter diagnosis. Lab Results   Component Value Date    WBC 5.9 03/25/2021    RBC 5.06 03/25/2021    HGB 14.3 03/25/2021    HCT 44.4 03/25/2021     03/25/2021      No results found for: CHOL, TRIG, HDL, LDLCALC  Lab Results   Component Value Date     03/25/2021    K 5.0 03/25/2021     03/25/2021    CO2 26 03/25/2021    GLUCOSE 105 03/25/2021    BUN 23 03/25/2021    CREATININE 1.2 03/25/2021    CALCIUM 8.9 03/25/2021    ALT 24 02/25/2021    AST 15 02/25/2021     Lab Results   Component Value Date    TSH 1.070 06/08/2012     Assessment:     Diagnosis Orders   1. Sick sinus syndrome ([address]     2. Pacemaker     3. PAF (paroxysmal atrial fibrillation) ([address]     4. Coronary artery disease involving native coronary artery of native heart without angina pectoris     5. Essential hypertension         Sick sinus syndrome/pacemaker  Pacemaker check showed adequate battery status @10.9 years estimated  Mode: AAIR-DDDR. Lead impedances are stable  Pacing: AP 99.1%,  2.5%. Appropriate diagnostics and safety margins noted. Sustained arrythmia: PAF 1.2%-increased recently. Reprogramming done for sensitivity and threshold testing. Please see the scanned interrogation report    PAF-recent increase per device check. Patient has never been anticoagulated. He continues on propafenone. He is asymptomatic. Discussed stroke risk and reviewed CHADS2 score which is elevated at 3. Begin anticoagulation with Eliquis 5 mg twice a day. Discussed fall precautions, report any bleeding issues    CAD-stable without symptoms of angina. Remote history of CAD with angioplasty in 2012. May consider stress testing in the future    Hypertension-stable on current regimen      Plan    Return in about 6 months (around 11/6/2021) for APRN.    Start Eliquis 5mg twice a day  Check carelink in 3 mo    Call with any questionsor concerns  Follow up with Deni Casarez MD for non cardiac problems  Report any new problems  Cardiovascular Fitness-Exercise as tolerated. Strive for 15 minutes of exercise most days of the week. Cardiac / HealthyDiet  Continue current medications as directed  Continue plan of treatment  It is always recommended that you bring your medicationsbottles with you to each visit - this is for your safety!        Jl Augustin, APRN

## 2021-05-24 DIAGNOSIS — I10 ESSENTIAL HYPERTENSION: ICD-10-CM

## 2021-06-25 ENCOUNTER — OFFICE VISIT (OUTPATIENT)
Dept: GASTROENTEROLOGY | Age: 76
End: 2021-06-25
Payer: MEDICARE

## 2021-06-25 VITALS
HEIGHT: 74 IN | SYSTOLIC BLOOD PRESSURE: 146 MMHG | DIASTOLIC BLOOD PRESSURE: 82 MMHG | HEART RATE: 84 BPM | OXYGEN SATURATION: 98 % | WEIGHT: 238.4 LBS | BODY MASS INDEX: 30.6 KG/M2

## 2021-06-25 DIAGNOSIS — R19.5 HEME POSITIVE STOOL: Primary | ICD-10-CM

## 2021-06-25 DIAGNOSIS — R19.8 ALTERNATING CONSTIPATION AND DIARRHEA: ICD-10-CM

## 2021-06-25 PROCEDURE — 1036F TOBACCO NON-USER: CPT | Performed by: NURSE PRACTITIONER

## 2021-06-25 PROCEDURE — 4040F PNEUMOC VAC/ADMIN/RCVD: CPT | Performed by: NURSE PRACTITIONER

## 2021-06-25 PROCEDURE — G8427 DOCREV CUR MEDS BY ELIG CLIN: HCPCS | Performed by: NURSE PRACTITIONER

## 2021-06-25 PROCEDURE — 99213 OFFICE O/P EST LOW 20 MIN: CPT | Performed by: NURSE PRACTITIONER

## 2021-06-25 PROCEDURE — G8417 CALC BMI ABV UP PARAM F/U: HCPCS | Performed by: NURSE PRACTITIONER

## 2021-06-25 PROCEDURE — 1123F ACP DISCUSS/DSCN MKR DOCD: CPT | Performed by: NURSE PRACTITIONER

## 2021-06-25 ASSESSMENT — ENCOUNTER SYMPTOMS
ANAL BLEEDING: 0
ABDOMINAL PAIN: 0
BACK PAIN: 0
ABDOMINAL DISTENTION: 0
TROUBLE SWALLOWING: 0
RECTAL PAIN: 0
VOICE CHANGE: 0
DIARRHEA: 0
NAUSEA: 0
BLOOD IN STOOL: 0
CONSTIPATION: 0
COUGH: 0
VOMITING: 0
SHORTNESS OF BREATH: 0

## 2021-06-25 NOTE — PROGRESS NOTES
Subjective:      Tona Culp is a71 y.o. male  Chief Complaint   Patient presents with   [place] Other       HPI  PCP: Danielle Del Real MD  Referring Provider: Tammy Oropeza MD  Pt is referred here from PCP for a colonoscopy for OB + stool. Previous pt of LESA Reynolds  New pt to me. I reviewed the referral notes, stool OB x1 card (+), and CBC (noting normal H/H). Pt had a colonoscopy in 2019 that revealed a polyp that was removed and internal hemorrhoids and was given a 5 yr recall. Pt denies any visible blood loss in stools. He has no new GI complaints at all. He reports he goes a few days with no BM, then will have a loose stool, then will have diarrhea stool for the next day or two, then this recycles, however this all started after his GB was removed in 5139 and certainly nothing new for him, and really not bothersome. Otherwise no GI complaints at all. We discussed a colonoscopy/EGD for eval of OB + stool, he defers. Family HX:    Pt denies family hx of colon polyps, colon CA, inflammatory bowel dx, gastric CA and esophageal CA.     Past Medical History:   Diagnosis Date    CAD (coronary artery disease) 12/6/2012    Colon polyp     [facility]     GERD (gastroesophageal reflux disease)     Hyperlipidemia     Hypertension     Near syncope 6/10/2012    Pacemaker 6/11/12    PAF (paroxysmal atrial fibrillation) ([facility] 10/30/2012          Past Surgical History:   Procedure Laterality Date    APPENDECTOMY      CARDIAC CATHETERIZATION  12/6/12   JDT    EF  60%  with angioplasty    CHOLECYSTECTOMY, LAPAROSCOPIC  11/2/15    COLONOSCOPY  1/2015    Nancy: mult polyps, 5 were tubular adenomas    COLONOSCOPY N/A 3/10/2016    Dr Tiki Pickett AP x 3, dysplasia (-), HP x 1, 3 yr recall    COLONOSCOPY N/A 8/15/2019    Dr Mistry Tiffani hemorrhoids-Grade 2, diverticulosis, AP-5 yr recall    EYE SURGERY Bilateral     CATARACT    FRACTURE SURGERY      Nose    KIDNEY SURGERY  PACEMAKER PLACEMENT         Social History     Socioeconomic History    Marital status:      Spouse name: None    Number of children: None    Years of education: None    Highest education level: None   Occupational History    None   Tobacco Use    Smoking status: Former Smoker     Packs/day: 0.00     Types: Cigarettes     Quit date: 2001     Years since quittin.0    Smokeless tobacco: Never Used   Vaping Use    Vaping Use: Never used   Substance and Sexual Activity    Alcohol use: No    Drug use: No    Sexual activity: None   Other Topics Concern    None   Social History Narrative    None     Social Determinants of Health     Financial Resource Strain:     Difficulty of Paying Living Expenses:    Food Insecurity:     Worried About Running Out of Food in the Last Year:     Ran Out of Food in the Last Year:    Transportation Needs:     Lack of Transportation (Medical):  Lack of Transportation (Non-Medical):    Physical Activity:     Days of Exercise per Week:     Minutes of Exercise per Session:    Stress:     Feeling of Stress :    Social Connections:     Frequency of Communication with Friends and Family:     Frequency of Social Gatherings with Friends and Family:     Attends Anabaptist Services:     Active Member of Clubs or Organizations:     Attends Club or Organization Meetings:     Marital Status:    Intimate Partner Violence:     Fear of Current or Ex-Partner:     Emotionally Abused:     Physically Abused:     Sexually Abused:         Allergies   Allergen Reactions    Lisinopril      cough       Current Outpatient Medications   Medication Sig Dispense Refill    apixaban (ELIQUIS) 5 MG TABS tablet 2 times daily      metoprolol tartrate (LOPRESSOR) 25 MG tablet TAKE ONE-HALF TABLET BY  MOUTH TWICE DAILY 90 tablet 1    amLODIPine (NORVASC) 5 MG tablet Take 1 tablet by mouth daily 90 tablet 3    propafenone (RYTHMOL) 150 MG tablet TAKE 1 TABLET BY MOUTH EVERY 8 HOURS 270 tablet 3    famotidine (PEPCID) 20 MG tablet Take 20 mg by mouth every evening      losartan (COZAAR) 100 MG tablet Take 0.5 tablet twice daily      metFORMIN (GLUCOPHAGE) 500 MG tablet Take 500 mg by mouth 2 times daily (with meals)       No current facility-administered medications for this visit. Review of Systems   Constitutional: Negative for fatigue and unexpected weight change. HENT: Negative for trouble swallowing and voice change. Respiratory: Negative for cough and shortness of breath. Cardiovascular: Negative for chest pain and palpitations. Gastrointestinal: Negative for abdominal distention, abdominal pain, anal bleeding, blood in stool, constipation, diarrhea, nausea, rectal pain and vomiting. Genitourinary: Negative for hematuria. Musculoskeletal: Negative for arthralgias, back pain and neck pain. Neurological: Negative for weakness and headaches. Psychiatric/Behavioral: Negative for dysphoric mood. The patient is not nervous/anxious. Objective:     Physical Exam  Vitals and nursing note reviewed. Constitutional:       Appearance: He is well-developed. Comments: BP (!) 146/82   Pulse 84   Ht 6' 2\" (1.88 m)   Wt 238 lb 6.4 oz (108.1 kg)   SpO2 98%   BMI 30.61 kg/m²    Eyes:      General: No scleral icterus. Conjunctiva/sclera: Conjunctivae normal.      Pupils: Pupils are equal, round, and reactive to light. Cardiovascular:      Rate and Rhythm: Normal rate and regular rhythm. Heart sounds: No murmur heard. No friction rub. No gallop. Pulmonary:      Effort: Pulmonary effort is normal. No respiratory distress. Breath sounds: Normal breath sounds. Abdominal:      General: Bowel sounds are normal. There is no distension. Palpations: Abdomen is soft. Tenderness: There is no abdominal tenderness. There is no rebound. Neurological:      Mental Status: He is alert and oriented to person, place, and time. Cranial Nerves: No cranial nerve deficit. Psychiatric:         Judgment: Judgment normal.           Assessment:       Diagnosis Orders   1. Heme positive stool     2. Alternating constipation and diarrhea           Plan:      1. F/u when next colonoscopy is due, or sooner if needed  2.  Can use fiber supplement to help with bowel regularity if needed

## 2021-08-04 DIAGNOSIS — I48.0 PAF (PAROXYSMAL ATRIAL FIBRILLATION) (HCC): ICD-10-CM

## 2021-08-04 DIAGNOSIS — Z95.0 PACEMAKER: ICD-10-CM

## 2021-08-04 RX ORDER — PROPAFENONE HYDROCHLORIDE 150 MG/1
TABLET, FILM COATED ORAL
Qty: 270 TABLET | Refills: 3 | Status: SHIPPED | OUTPATIENT
Start: 2021-08-04

## 2021-08-06 DIAGNOSIS — I48.0 PAF (PAROXYSMAL ATRIAL FIBRILLATION) (HCC): ICD-10-CM

## 2021-08-06 DIAGNOSIS — Z95.0 PACEMAKER: Primary | ICD-10-CM

## 2021-08-06 DIAGNOSIS — I49.5 SICK SINUS SYNDROME (HCC): ICD-10-CM

## 2021-08-06 PROCEDURE — 93298 REM INTERROG DEV EVAL SCRMS: CPT | Performed by: CLINICAL NURSE SPECIALIST

## 2021-08-06 PROCEDURE — G2066 INTER DEVC REMOTE 30D: HCPCS | Performed by: CLINICAL NURSE SPECIALIST

## 2021-08-23 ENCOUNTER — OFFICE VISIT (OUTPATIENT)
Dept: URGENT CARE | Age: 76
End: 2021-08-23
Payer: MEDICARE

## 2021-08-23 ENCOUNTER — OFFICE VISIT (OUTPATIENT)
Age: 76
End: 2021-08-23

## 2021-08-23 VITALS
DIASTOLIC BLOOD PRESSURE: 91 MMHG | HEART RATE: 86 BPM | SYSTOLIC BLOOD PRESSURE: 170 MMHG | OXYGEN SATURATION: 99 % | TEMPERATURE: 97.3 F

## 2021-08-23 DIAGNOSIS — Z11.59 SCREENING FOR VIRAL DISEASE: Primary | ICD-10-CM

## 2021-08-23 DIAGNOSIS — J06.9 URI WITH COUGH AND CONGESTION: Primary | ICD-10-CM

## 2021-08-23 PROCEDURE — G8427 DOCREV CUR MEDS BY ELIG CLIN: HCPCS | Performed by: NURSE PRACTITIONER

## 2021-08-23 PROCEDURE — G8417 CALC BMI ABV UP PARAM F/U: HCPCS | Performed by: NURSE PRACTITIONER

## 2021-08-23 PROCEDURE — 99999 PR OFFICE/OUTPT VISIT,PROCEDURE ONLY: CPT | Performed by: NURSE PRACTITIONER

## 2021-08-23 PROCEDURE — 1036F TOBACCO NON-USER: CPT | Performed by: NURSE PRACTITIONER

## 2021-08-23 PROCEDURE — 99213 OFFICE O/P EST LOW 20 MIN: CPT | Performed by: NURSE PRACTITIONER

## 2021-08-23 PROCEDURE — 4040F PNEUMOC VAC/ADMIN/RCVD: CPT | Performed by: NURSE PRACTITIONER

## 2021-08-23 PROCEDURE — 1123F ACP DISCUSS/DSCN MKR DOCD: CPT | Performed by: NURSE PRACTITIONER

## 2021-08-23 RX ORDER — METHYLPREDNISOLONE 4 MG/1
TABLET ORAL
Qty: 1 KIT | Refills: 0 | Status: SHIPPED | OUTPATIENT
Start: 2021-08-23 | End: 2021-11-09 | Stop reason: ALTCHOICE

## 2021-08-23 RX ORDER — CEFDINIR 300 MG/1
300 CAPSULE ORAL 2 TIMES DAILY
Qty: 20 CAPSULE | Refills: 0 | Status: SHIPPED | OUTPATIENT
Start: 2021-08-23 | End: 2021-09-02

## 2021-08-23 RX ORDER — BENZONATATE 100 MG/1
100 CAPSULE ORAL 3 TIMES DAILY PRN
Qty: 21 CAPSULE | Refills: 0 | Status: SHIPPED | OUTPATIENT
Start: 2021-08-23 | End: 2021-08-30

## 2021-08-23 ASSESSMENT — ENCOUNTER SYMPTOMS
SORE THROAT: 0
COUGH: 1
WHEEZING: 1

## 2021-08-23 NOTE — PROGRESS NOTES
[address] [facility]  [address] [address] [place] [address]  Dept: [phone]  Dept Fax: [phone]  Loc: [phone]    Christiano De Paz is a 67 y.o. male who presents today for his medical conditions/complaintsas noted below. Christiano De Paz is c/o of Cough (worse at night, ongoing since wednesday ) and Fatigue        HPI:     Cough  This is a new problem. The current episode started in the past 7 days. The problem has been unchanged. The problem occurs constantly. The cough is non-productive. Associated symptoms include nasal congestion and wheezing (at night). Pertinent negatives include no fever or sore throat. Nothing aggravates the symptoms. Fatigue  Associated symptoms include coughing and fatigue. Pertinent negatives include no fever or sore throat. No known exposure to covid-19. Is vaccinated for covid-19.    Past Medical History:   Diagnosis Date    CAD (coronary artery disease) 2012    Colon polyp     Diabetes mellitus ([facility] [address]     GERD (gastroesophageal reflux disease)     Hyperlipidemia     Hypertension     Near syncope 6/10/2012    Pacemaker 12    PAF (paroxysmal atrial fibrillation) ([facility] [address] 10/30/2012     Past Surgical History:   Procedure Laterality Date    APPENDECTOMY      CARDIAC CATHETERIZATION  12   JDT    EF  60%  with angioplasty    CHOLECYSTECTOMY, LAPAROSCOPIC  11/2/15    COLONOSCOPY  2015    Nancy: mult polyps, 5 were tubular adenomas    COLONOSCOPY N/A 3/10/2016    Dr Hellen Celaya AP x 3, dysplasia (-), HP x 1, 3 yr recall    COLONOSCOPY N/A 8/15/2019    Dr Fung Means hemorrhoids-Grade 2, diverticulosis, AP-5 yr recall    EYE SURGERY Bilateral     CATARACT    FRACTURE SURGERY      Nose    KIDNEY SURGERY      PACEMAKER PLACEMENT         Family History   Problem Relation Age of Onset    Heart Failure Mother          at age 80    Heart Disease Maternal Uncle     Colon Cancer Neg Hx     Colon Polyps Neg Hx     Esophageal Cancer Neg Hx     Liver Disease Neg Hx     Liver Cancer Neg Hx     Rectal Cancer Neg Hx     Stomach Cancer Neg Hx        Social History     Tobacco Use    Smoking status: Former Smoker     Packs/day: 0.00     Types: Cigarettes     Quit date: 2001     Years since quittin.1    Smokeless tobacco: Never Used   Substance Use Topics    Alcohol use: No      Current Outpatient Medications   Medication Sig Dispense Refill    cefdinir (OMNICEF) 300 MG capsule Take 1 capsule by mouth 2 times daily for 10 days 20 capsule 0    methylPREDNISolone (MEDROL DOSEPACK) 4 MG tablet Take by mouth. 1 kit 0    benzonatate (TESSALON) 100 MG capsule Take 1 capsule by mouth 3 times daily as needed for Cough 21 capsule 0    propafenone (RYTHMOL) 150 MG tablet TAKE 1 TABLET BY MOUTH  EVERY 8 HOURS 270 tablet 3    apixaban (ELIQUIS) 5 MG TABS tablet 2 times daily      metoprolol tartrate (LOPRESSOR) 25 MG tablet TAKE ONE-HALF TABLET BY  MOUTH TWICE DAILY 90 tablet 1    amLODIPine (NORVASC) 5 MG tablet Take 1 tablet by mouth daily 90 tablet 3    famotidine (PEPCID) 20 MG tablet Take 20 mg by mouth every evening      losartan (COZAAR) 100 MG tablet Take 0.5 tablet twice daily      metFORMIN (GLUCOPHAGE) 500 MG tablet Take 500 mg by mouth 2 times daily (with meals)       No current facility-administered medications for this visit.      Allergies   Allergen Reactions    Lisinopril      cough       Health Maintenance   Topic Date Due    Hepatitis C screen  Never done    DTaP/Tdap/Td vaccine (1 - Tdap) Never done    Shingles Vaccine (1 of 2) Never done    Pneumococcal 65+ years Vaccine (1 of 1 - PPSV23) 09/15/2009    Annual Wellness Visit (AWV)  Never done    Flu vaccine (1) 2021    Potassium monitoring  2022    Creatinine monitoring  2022    COVID-19 Vaccine  Completed    Hepatitis A vaccine  Aged Out    Hepatitis B vaccine  Aged Out    Hib vaccine  Aged C/ Dudley Child 19 Meningococcal (ACWY) vaccine  Aged Out       Subjective:     Review of Systems   Constitutional: Positive for fatigue. Negative for fever. HENT: Negative for sore throat. Respiratory: Positive for cough and wheezing (at night). All other systems reviewed and are negative.      :Objective      Physical Exam  Vitals and nursing note reviewed. Constitutional:       General: He is not in acute distress. Appearance: Normal appearance. He is well-developed. He is ill-appearing. He is not diaphoretic. HENT:      Head: Normocephalic and atraumatic. Right Ear: Tympanic membrane, ear canal and external ear normal.      Left Ear: Tympanic membrane, ear canal and external ear normal.      Nose: Congestion present. Mouth/Throat:      Mouth: Mucous membranes are moist.      Pharynx: Oropharynx is clear. No posterior oropharyngeal erythema. Eyes:      Conjunctiva/sclera: Conjunctivae normal.      Pupils: Pupils are equal, round, and reactive to light. Cardiovascular:      Rate and Rhythm: Normal rate and regular rhythm. Heart sounds: Normal heart sounds. No murmur heard. Pulmonary:      Effort: Pulmonary effort is normal. No respiratory distress. Breath sounds: Normal breath sounds. No wheezing. Skin:     General: Skin is warm and dry. Findings: No rash. Neurological:      Mental Status: He is alert and oriented to person, place, and time. Psychiatric:         Mood and Affect: Mood normal.         Behavior: Behavior normal.       BP (!) 170/91   Pulse 86   Temp 97.3 °F (36.3 °C) (Temporal)   SpO2 99%     :Assessment       Diagnosis Orders   1. URI with cough and congestion  cefdinir (OMNICEF) 300 MG capsule    methylPREDNISolone (MEDROL DOSEPACK) 4 MG tablet    benzonatate (TESSALON) 100 MG capsule       :Plan   1. Rest and increase fluid intake. Antibiotic and steroid as prescribed. Tessalon as needed for cough. 2. Covid-19 testing.  Quarantine at home until results are and go to all appointments, and call your doctor if you are having problems. It's also a good idea to know your test results and keep a list of the medicines you take. How can you care for yourself at home? · To prevent dehydration, drink plenty of fluids. Choose water and other caffeine-free clear liquids until you feel better. If you have kidney, heart, or liver disease and have to limit fluids, talk with your doctor before you increase the amount of fluids you drink. · Take an over-the-counter pain medicine, such as acetaminophen (Tylenol), ibuprofen (Advil, Motrin), or naproxen (Aleve). Read and follow all instructions on the label. · Before you use cough and cold medicines, check the label. These medicines may not be safe for young children or for people with certain health problems. · Be careful when taking over-the-counter cold or flu medicines and Tylenol at the same time. Many of these medicines have acetaminophen, which is Tylenol. Read the labels to make sure that you are not taking more than the recommended dose. Too much acetaminophen (Tylenol) can be harmful. · Get plenty of rest.  · Do not smoke or allow others to smoke around you. If you need help quitting, talk to your doctor about stop-smoking programs and medicines. These can increase your chances of quitting for good. When should you call for help? Call 911 anytime you think you may need emergency care. For example, call if:    · You have severe trouble breathing. Call your doctor now or seek immediate medical care if:    · You seem to be getting much sicker.     · You have new or worse trouble breathing.     · You have a new or higher fever.     · You have a new rash.    Watch closely for changes in your health, and be sure to contact your doctor if:    · You have a new symptom, such as a sore throat, an earache, or sinus pain.     · You cough more deeply or more often, especially if you notice more mucus or a change in the color of your mucus.     · You do not get better as expected. Where can you learn more? Go to https://chpepiceweb.hint. org and sign in to your SofGenie account. Enter I584 in the [facility] box to learn more about \"Upper Respiratory Infection (Cold): Care Instructions. \"     If you do not have an account, please click on the \"Sign Up Now\" link. Current as of: October 26, 2020               Content Version: 12.9  © 2006-2021 [employer]. Care instructions adapted under license by [facility] ([facility]). If you have questions about a medical condition or this instruction, always ask your healthcare professional. [place] [address] any warranty or liability for your use of this information. Patient Education        Learning About Coronavirus [phone])  What is coronavirus (COVID-19)? COVID-19 is a disease caused by a new type of coronavirus. This illness was first found in December 2019. It has since spread worldwide. Coronaviruses are a large group of viruses. They cause the common cold. They also cause more serious illnesses like [place] respiratory syndrome (MERS) and severe acute respiratory syndrome (SARS). COVID-19 is caused by a novel coronavirus. That means it's a new type that has not been seen in people before. What are the symptoms? Coronavirus (COVID-19) symptoms may include:  · Fever. · Cough. · Trouble breathing. · Chills or repeated shaking with chills. · Muscle pain. · Headache. · Sore throat. · New loss of taste or smell. · Vomiting. · Diarrhea. In severe cases, COVID-19 can cause pneumonia and make it hard to breathe without help from a machine. It can cause death. How is it diagnosed? COVID-19 is diagnosed with a viral test. This may also be called a PCR test or antigen test. It looks for evidence of the virus in your breathing passages or lungs (respiratory system).   The test is most often done on a sample from the nose, throat, or lungs. It's sometimes done on a sample of saliva. One way a sample is collected is by putting a long swab into the back of your nose. How is it treated? Mild cases of COVID-19 can be treated at home. Serious cases need treatment in the hospital. Treatment may include medicines to reduce symptoms, plus breathing support such as oxygen therapy or a ventilator. Some people may be placed on their belly to help their oxygen levels. Treatments that may help people who have COVID-19 include:  Antiviral medicines. These medicines treat viral infections. Remdesivir is an example. Immune-based therapy. These medicines help the immune system fight COVID-19. One example is bamlanivimab. It's a monoclonal antibody. Blood thinners. These medicines help prevent blood clots. People with severe illness are at risk for blood clots. How can you protect yourself and others? The best way to protect yourself from getting sick is to:  · Avoid areas where there is an outbreak. · Avoid contact with people who may be infected. · Avoid crowds and try to stay at least 6 feet away from other people. · Wash your hands often, especially after you cough or sneeze. Use soap and water, and scrub for at least 20 seconds. If soap and water aren't available, use an alcohol-based hand . · Avoid touching your mouth, nose, and eyes. To help avoid spreading the virus to others:  · Stay home if you are sick or have been exposed to the virus. Don't go to school, work, or public areas. And don't use public transportation, ride-shares, or taxis unless you have no choice. · Wear a cloth face cover if you have to go to public areas. · Cover your mouth with a tissue when you cough or sneeze. Then throw the tissue in the trash and wash your hands right away. · If you're sick:  ? Leave your home only if you need to get medical care. But call the doctor's office first so they know you're coming. And wear a face cover. ?  Wear the face cover whenever you're around other people. It can help stop the spread of the virus. ? Limit contact with pets and people in your home. If possible, stay in a separate bedroom and use a separate bathroom. ? Clean and disinfect your home every day. Use household  and disinfectant wipes or sprays. Take special care to clean things that you grab with your hands. These include doorknobs, remote controls, phones, and handles on your refrigerator and microwave. And don't forget countertops, tabletops, bathrooms, and computer keyboards. When should you call for help? Call 911 anytime you think you may need emergency care. For example, call if you have life-threatening symptoms, such as:    · You have severe trouble breathing. (You can't talk at all.)     · You have constant chest pain or pressure.     · You are severely dizzy or lightheaded.     · You are confused or can't think clearly.     · Your face and lips have a blue color.     · You pass out (lose consciousness) or are very hard to wake up. Call your doctor now or seek immediate medical care if:    · You have moderate trouble breathing. (You can't speak a full sentence.)     · You are coughing up blood (more than about 1 teaspoon).     · You have signs of low blood pressure. These include feeling lightheaded; being too weak to stand; and having cold, pale, clammy skin. Watch closely for changes in your health, and be sure to contact your doctor if:    · Your symptoms get worse.     · You are not getting better as expected. Call before you go to the doctor's office. Follow their instructions. And wear a cloth face cover. Current as of: March 26, 2021               Content Version: 12.9  © 2006-2021 Healthwise, [employer]. Care instructions adapted under license by [facility] ([facility]).  If you have questions about a medical condition or this instruction, always ask your healthcare professional. Chuckie Foster disclaims any warranty or liability for your use of this information. 1. Rest and increase fluid intake. Antibiotic and steroid as prescribed. Tessalon as needed for cough. 2. Covid-19 testing. Quarantine at home until results are called to you. If positive you will have to quarantine for 14days. If positive the health dept will also contact you. 3. Monitor for fever and treat as needed with tylenol or ibuprofen  4. If patient is not improving or developing any new/worsening symptoms then return to clinic as needed or go to ER. Patient is to follow up with PCP as needed.          Electronically signed by LESA Reyes on 8/23/2021 at 12:28 PM

## 2021-08-23 NOTE — PATIENT INSTRUCTIONS
Patient Education        Upper Respiratory Infection (Cold): Care Instructions  Your Care Instructions     An upper respiratory infection, or URI, is an infection of the nose, sinuses, or throat. URIs are spread by coughs, sneezes, and direct contact. The common cold is the most frequent kind of URI. The flu and sinus infections are other kinds of URIs. Almost all URIs are caused by viruses. Antibiotics won't cure them. But you can treat most infections with home care. This may include drinking lots of fluids and taking over-the-counter pain medicine. You will probably feel better in 4 to 10 days. The doctor has checked you carefully, but problems can develop later. If you notice any problems or new symptoms, get medical treatment right away. Follow-up care is a key part of your treatment and safety. Be sure to make and go to all appointments, and call your doctor if you are having problems. It's also a good idea to know your test results and keep a list of the medicines you take. How can you care for yourself at home? · To prevent dehydration, drink plenty of fluids. Choose water and other caffeine-free clear liquids until you feel better. If you have kidney, heart, or liver disease and have to limit fluids, talk with your doctor before you increase the amount of fluids you drink. · Take an over-the-counter pain medicine, such as acetaminophen (Tylenol), ibuprofen (Advil, Motrin), or naproxen (Aleve). Read and follow all instructions on the label. · Before you use cough and cold medicines, check the label. These medicines may not be safe for young children or for people with certain health problems. · Be careful when taking over-the-counter cold or flu medicines and Tylenol at the same time. Many of these medicines have acetaminophen, which is Tylenol. Read the labels to make sure that you are not taking more than the recommended dose. Too much acetaminophen (Tylenol) can be harmful.   · Get plenty of rest.  · Do not smoke or allow others to smoke around you. If you need help quitting, talk to your doctor about stop-smoking programs and medicines. These can increase your chances of quitting for good. When should you call for help? Call 911 anytime you think you may need emergency care. For example, call if:    · You have severe trouble breathing. Call your doctor now or seek immediate medical care if:    · You seem to be getting much sicker.     · You have new or worse trouble breathing.     · You have a new or higher fever.     · You have a new rash. Watch closely for changes in your health, and be sure to contact your doctor if:    · You have a new symptom, such as a sore throat, an earache, or sinus pain.     · You cough more deeply or more often, especially if you notice more mucus or a change in the color of your mucus.     · You do not get better as expected. Where can you learn more? Go to https://[employer].Ematic Solutions. org and sign in to your Saisei account. Enter X016 in the v2tel box to learn more about \"Upper Respiratory Infection (Cold): Care Instructions. \"     If you do not have an account, please click on the \"Sign Up Now\" link. Current as of: October 26, 2020               Content Version: 12.9  © 2006-2021 [employer]. Care instructions adapted under license by [employer]. If you have questions about a medical condition or this instruction, always ask your healthcare professional. [place] [address] any warranty or liability for your use of this information. Patient Education        Learning About Coronavirus [phone])  What is coronavirus (COVID-19)? COVID-19 is a disease caused by a new type of coronavirus. This illness was first found in December 2019. It has since spread worldwide. Coronaviruses are a large group of viruses. They cause the common cold.  They also cause more serious illnesses like [place] respiratory syndrome (MERS) and severe acute respiratory syndrome (SARS). COVID-19 is caused by a novel coronavirus. That means it's a new type that has not been seen in people before. What are the symptoms? Coronavirus (COVID-19) symptoms may include:  · Fever. · Cough. · Trouble breathing. · Chills or repeated shaking with chills. · Muscle pain. · Headache. · Sore throat. · New loss of taste or smell. · Vomiting. · Diarrhea. In severe cases, COVID-19 can cause pneumonia and make it hard to breathe without help from a machine. It can cause death. How is it diagnosed? COVID-19 is diagnosed with a viral test. This may also be called a PCR test or antigen test. It looks for evidence of the virus in your breathing passages or lungs (respiratory system). The test is most often done on a sample from the nose, throat, or lungs. It's sometimes done on a sample of saliva. One way a sample is collected is by putting a long swab into the back of your nose. How is it treated? Mild cases of COVID-19 can be treated at home. Serious cases need treatment in the hospital. Treatment may include medicines to reduce symptoms, plus breathing support such as oxygen therapy or a ventilator. Some people may be placed on their belly to help their oxygen levels. Treatments that may help people who have COVID-19 include:  Antiviral medicines. These medicines treat viral infections. Remdesivir is an example. Immune-based therapy. These medicines help the immune system fight COVID-19. One example is bamlanivimab. It's a monoclonal antibody. Blood thinners. These medicines help prevent blood clots. People with severe illness are at risk for blood clots. How can you protect yourself and others? The best way to protect yourself from getting sick is to:  · Avoid areas where there is an outbreak. · Avoid contact with people who may be infected.   · Avoid crowds and try to stay at least 6 feet away from other people. · Wash your hands often, especially after you cough or sneeze. Use soap and water, and scrub for at least 20 seconds. If soap and water aren't available, use an alcohol-based hand . · Avoid touching your mouth, nose, and eyes. To help avoid spreading the virus to others:  · Stay home if you are sick or have been exposed to the virus. Don't go to school, work, or public areas. And don't use public transportation, ride-shares, or taxis unless you have no choice. · Wear a cloth face cover if you have to go to public areas. · Cover your mouth with a tissue when you cough or sneeze. Then throw the tissue in the trash and wash your hands right away. · If you're sick:  ? Leave your home only if you need to get medical care. But call the doctor's office first so they know you're coming. And wear a face cover. ? Wear the face cover whenever you're around other people. It can help stop the spread of the virus. ? Limit contact with pets and people in your home. If possible, stay in a separate bedroom and use a separate bathroom. ? Clean and disinfect your home every day. Use household  and disinfectant wipes or sprays. Take special care to clean things that you grab with your hands. These include doorknobs, remote controls, phones, and handles on your refrigerator and microwave. And don't forget countertops, tabletops, bathrooms, and computer keyboards. When should you call for help? Call 911 anytime you think you may need emergency care. For example, call if you have life-threatening symptoms, such as:    · You have severe trouble breathing. (You can't talk at all.)     · You have constant chest pain or pressure.     · You are severely dizzy or lightheaded.     · You are confused or can't think clearly.     · Your face and lips have a blue color.     · You pass out (lose consciousness) or are very hard to wake up.    Call your doctor now or seek immediate medical care if:    · You have moderate trouble breathing. (You can't speak a full sentence.)     · You are coughing up blood (more than about 1 teaspoon).     · You have signs of low blood pressure. These include feeling lightheaded; being too weak to stand; and having cold, pale, clammy skin. Watch closely for changes in your health, and be sure to contact your doctor if:    · Your symptoms get worse.     · You are not getting better as expected. Call before you go to the doctor's office. Follow their instructions. And wear a cloth face cover. Current as of: March 26, 2021               Content Version: 12.9  © 2006-2021 Healthwise, [employer]. Care instructions adapted under license by [facility] ([facility]). If you have questions about a medical condition or this instruction, always ask your healthcare professional. [address] any warranty or liability for your use of this information. 1. Rest and increase fluid intake. Antibiotic and steroid as prescribed. Tessalon as needed for cough. 2. Covid-19 testing. Quarantine at home until results are called to you. If positive you will have to quarantine for 14days. If positive the health dept will also contact you. 3. Monitor for fever and treat as needed with tylenol or ibuprofen  4. If patient is not improving or developing any new/worsening symptoms then return to clinic as needed or go to ER. Patient is to follow up with PCP as needed.

## 2021-08-26 LAB — SARS-COV-2, NAA: NOT DETECTED

## 2021-09-08 DIAGNOSIS — Z95.0 PACEMAKER: Primary | ICD-10-CM

## 2021-09-08 DIAGNOSIS — I49.5 SICK SINUS SYNDROME (HCC): ICD-10-CM

## 2021-10-19 ENCOUNTER — TELEPHONE (OUTPATIENT)
Dept: CARDIOLOGY CLINIC | Age: 76
End: 2021-10-19

## 2021-10-28 DIAGNOSIS — I10 ESSENTIAL HYPERTENSION: ICD-10-CM

## 2021-10-28 RX ORDER — AMLODIPINE BESYLATE 5 MG/1
5 TABLET ORAL DAILY
Qty: 90 TABLET | Refills: 3 | Status: SHIPPED | OUTPATIENT
Start: 2021-10-28

## 2021-11-09 ENCOUNTER — OFFICE VISIT (OUTPATIENT)
Dept: CARDIOLOGY CLINIC | Age: 76
End: 2021-11-09
Payer: MEDICARE

## 2021-11-09 VITALS
HEART RATE: 77 BPM | WEIGHT: 238 LBS | DIASTOLIC BLOOD PRESSURE: 84 MMHG | SYSTOLIC BLOOD PRESSURE: 142 MMHG | HEIGHT: 74 IN | BODY MASS INDEX: 30.54 KG/M2

## 2021-11-09 DIAGNOSIS — I10 ESSENTIAL HYPERTENSION: ICD-10-CM

## 2021-11-09 DIAGNOSIS — I49.5 SICK SINUS SYNDROME (HCC): ICD-10-CM

## 2021-11-09 DIAGNOSIS — I25.10 CORONARY ARTERY DISEASE INVOLVING NATIVE CORONARY ARTERY OF NATIVE HEART WITHOUT ANGINA PECTORIS: ICD-10-CM

## 2021-11-09 DIAGNOSIS — I48.0 PAF (PAROXYSMAL ATRIAL FIBRILLATION) (HCC): ICD-10-CM

## 2021-11-09 DIAGNOSIS — Z95.0 PACEMAKER: Primary | ICD-10-CM

## 2021-11-09 PROCEDURE — 99214 OFFICE O/P EST MOD 30 MIN: CPT | Performed by: CLINICAL NURSE SPECIALIST

## 2021-11-09 PROCEDURE — 4040F PNEUMOC VAC/ADMIN/RCVD: CPT | Performed by: CLINICAL NURSE SPECIALIST

## 2021-11-09 PROCEDURE — G8417 CALC BMI ABV UP PARAM F/U: HCPCS | Performed by: CLINICAL NURSE SPECIALIST

## 2021-11-09 PROCEDURE — 1036F TOBACCO NON-USER: CPT | Performed by: CLINICAL NURSE SPECIALIST

## 2021-11-09 PROCEDURE — G8427 DOCREV CUR MEDS BY ELIG CLIN: HCPCS | Performed by: CLINICAL NURSE SPECIALIST

## 2021-11-09 PROCEDURE — 1123F ACP DISCUSS/DSCN MKR DOCD: CPT | Performed by: CLINICAL NURSE SPECIALIST

## 2021-11-09 PROCEDURE — 93280 PM DEVICE PROGR EVAL DUAL: CPT | Performed by: CLINICAL NURSE SPECIALIST

## 2021-11-09 PROCEDURE — G8484 FLU IMMUNIZE NO ADMIN: HCPCS | Performed by: CLINICAL NURSE SPECIALIST

## 2021-11-09 RX ORDER — ASPIRIN 81 MG/1
81 TABLET ORAL DAILY
Qty: 90 TABLET | Refills: 1 | COMMUNITY
Start: 2021-11-09

## 2021-11-09 NOTE — PATIENT INSTRUCTIONS
Will check pacer in 1 mos for afib burden  Stop the Eliquis for now- if you have any more afib will restart it  Go back on the low dose aspirin  Let me know if the shortness of breath gets any worse  Follow up in 6 mos   Call with any questions or concerns  Follow up with Meryle Bott, MD for non cardiac problems  Report any new problems  Cardiovascular Fitness-Exercise as tolerated. Strive for 30 minutes of exercise most days of the week. Cardiac / Healthy Diet  Continue current medications as directed  Continue plan of treatment  It is always recommended that you bring your medications bottles with you to each visit - this is for your safety!

## 2021-11-09 NOTE — PROGRESS NOTES
[facility]  [place] [address]  Phone: [phone]  Fax: [phone]    OFFICE VISIT:  2021    Erin Gardner - : 1945    Reason For Visit:  Fifi Mclain is a 67 y.o. male who is here for Follow-up (no cardiac symptoms) and Irregular Heart Beat (pacemaker)  History of nonocclusive coronary disease with last heart catheterization in  EF 45%. Pacemaker placed in  for sick sinus syndrome with paroxysmal atrial fibrillation controlled on Rythmol. Had generator replacement 3/25/2021    Complaining of some RITTER earlier this year. Had 2D echo that showed normal LV size with EF 55%. Mild concentric LVH with normal diastolic function. Mild to moderate LA enlargement no significant valvular disease. Mildly dilated ascending aortic measuring 3.6 cm    Patient is here today for routine follow-up and pacemaker interrogation. He states overall he is doing fairly well. Continues to have RITTER that has been less active over the last year. Recovers quickly. Does have concerned over the cost of Eliquis and worsening bruising. Subjective  Glendal denies exertional chest pain, shortness of breath, orthopnea, paroxysmal nocturnal dyspnea, syncope, presyncope, arrhythmia, edema and fatigue. The patient denies numbness or weakness to suggest cerebrovascular accident or transient ischemic attack. Saad Buck MD is PCP and follows labs .   Erin Gardner has the following history as [facility] in [facility]:    Patient Active Problem List    Diagnosis Date Noted    Non-ischemic cardiomyopathy [facility]     Alternating constipation and diarrhea 2021    Renal mass, right 2020    Dizziness 2018    RITTER (dyspnea on exertion) 2018    History of adenomatous polyp of colon 03/10/2016    Heme positive stool 2014    Rectal bleeding 2014    CAD (coronary artery disease) 2012    PAF (paroxysmal atrial fibrillation) ([address] 10/30/2012    Hypertension     Pacemaker 2012    Sick sinus syndrome ([facility] 2012    Near syncope 06/10/2012     Past Medical History:   Diagnosis Date    CAD (coronary artery disease) 2012    Colon polyp     Diabetes mellitus ([facility]     GERD (gastroesophageal reflux disease)     Hyperlipidemia     Hypertension     Near syncope 6/10/2012    Pacemaker 12    PAF (paroxysmal atrial fibrillation) ([facility] 10/30/2012     Past Surgical History:   Procedure Laterality Date    APPENDECTOMY      CARDIAC CATHETERIZATION  12   JDT    EF  60%  with angioplasty    CHOLECYSTECTOMY, LAPAROSCOPIC  11/2/15    COLONOSCOPY  2015    Nancy: mult polyps, 5 were tubular adenomas    COLONOSCOPY N/A 3/10/2016    Dr John Dominique AP x 3, dysplasia (-), HP x 1, 3 yr recall    COLONOSCOPY N/A 8/15/2019    Dr Rossana Muller hemorrhoids-Grade 2, diverticulosis, AP-5 yr recall    EYE SURGERY Bilateral     CATARACT    FRACTURE SURGERY      Nose    KIDNEY SURGERY      PACEMAKER PLACEMENT       Family History   Problem Relation Age of Onset    Heart Failure Mother          at age 80    Heart Disease Maternal Uncle     Colon Cancer Neg Hx     Colon Polyps Neg Hx     Esophageal Cancer Neg Hx     Liver Disease Neg Hx     Liver Cancer Neg Hx     Rectal Cancer Neg Hx     Stomach Cancer Neg Hx      Social History     Tobacco Use    Smoking status: Former Smoker     Packs/day: 0.00     Types: Cigarettes     Quit date: 2001     Years since quittin.3    Smokeless tobacco: Never Used   Substance Use Topics    Alcohol use: No      Current Outpatient Medications   Medication Sig Dispense Refill    aspirin EC 81 MG EC tablet Take 1 tablet by mouth daily 90 tablet 1    amLODIPine (NORVASC) 5 MG tablet TAKE 1 TABLET BY MOUTH  DAILY 90 tablet 3    metoprolol tartrate (LOPRESSOR) 25 MG tablet TAKE ONE-HALF TABLET BY  MOUTH TWICE DAILY 90 tablet 3    propafenone (RYTHMOL) 150 MG tablet TAKE 1 TABLET BY MOUTH  EVERY 8 HOURS 270 tablet 3    famotidine (PEPCID) 20 MG tablet Take 20 mg by mouth every evening      losartan (COZAAR) 100 MG tablet Take 0.5 tablet twice daily      metFORMIN (GLUCOPHAGE) 500 MG tablet Take 500 mg by mouth 2 times daily (with meals)       No current facility-administered medications for this visit. Allergies: Lisinopril    Review of Systems  Constitutional  no significant activity change, appetite change, or unexpected weight change. No fever, chills or diaphoresis. No fatigue. HEENT  no significant rhinorrhea or epistaxis. No tinnitus or significant hearing loss. Eyes  no sudden vision change or amaurosis. Respiratory  no significant wheezing, stridor, apnea or cough. + dyspnea on exertion . No resting shortness of breath. Cardiovascular  no exertional chest pain, orthopnea or PND. No sensation of arrhythmia or slow heart rate. No claudication or leg edema. Gastrointestinal  no abdominal swelling or pain. No blood in stool. No severe constipation, diarrhea, nausea, or vomiting. Genitourinary  no difficulty urinating, dysuria, frequency, or urgency. No flank pain or hematuria. Musculoskeletal  no back pain, gait disturbance, or myalgia. Skin  no color change or rash. No pallor. No new surgical incision. Neurologic  no speech difficulty, facial asymmetry or lateralizing weakness. No seizures, presyncope, syncope, or significant dizziness. Hematologic  + easy bruising no excessive bleeding. Psychiatric  no severe anxiety or insomnia. No confusion. All other review of systems are negative. Objective  Vital Signs - BP (!) 142/84   Pulse 77   Ht 6' 2\" (1.88 m)   Wt 238 lb (108 kg)   BMI 30.56 kg/m²   General - Glendal is alert, cooperative, and pleasant. Well groomed. No acute distress. Body habitus is overweight. HEENT  The head is normocephalic. No circumoral cyanosis.   Dentition is normal.   EYES -  No Xanthelasma, no arcus senilis, no conjunctival hemorrhages or discharge. Neck - Supple, without increased jugular venous pressures. No carotid bruits. No mass. Respiratory - Lungs are clear bilaterally. No wheezes or rales. Normal effort without use of accessory muscles. Cardiovascular  Heart has regular rhythm and rate. No murmurs, rubs or gallops. + pedal pulses and no varicosities. Abdominal -  Soft, nontender, nondistended. Bowel sounds are intact. Extremities - No clubbing, cyanosis, or  edema. Musculoskeletal -  No clubbing . No Osler's nodes. Gait normal .  No kyphosis or scoliosis. Skin -  no statis ulcers or dermatitis. Neurological - No focal signs are identified. Oriented to person, place and time. Psychiatric -  Appropriate affect and mood. Assessment:     Diagnosis Orders   1. Pacemaker     2. Sick sinus syndrome ([facility]     3. PAF (paroxysmal atrial fibrillation) ([facility]     4. Coronary artery disease involving native coronary artery of native heart without angina pectoris     5. Essential hypertension       Data:  BP Readings from Last 3 Encounters:   11/09/21 (!) 142/84   08/23/21 (!) 170/91   06/25/21 (!) 146/82    Pulse Readings from Last 3 Encounters:   11/09/21 77   08/23/21 86   06/25/21 84        Wt Readings from Last 3 Encounters:   11/09/21 238 lb (108 kg)   06/25/21 238 lb 6.4 oz (108.1 kg)   05/06/21 240 lb (108.9 kg)   Pacemaker check showed adequate battery status- approximately 11.8 years   and  appropriate diagnostics without any sustained arrhythmias. There is been some ongoing noise and over sensing on atrial lead. Looking back has not had any true sustained atrial fibrillation. PVAB extended to help differentiate. Very small P waves.   Does pace atrially 99.5% and V paced 1.9%  Mode AAIRDDDR at 61  Next check in 1 months via [employer] home monitoring system        [id number] Score for Atrial Fibrillation Stroke Risk   Risk   Factors  Component Value   C CHF No 0   H HTN Yes 1   A2 Age >= 76 Yes,  (78 y.o.) 2   D DM No 0   S2 Prior Stroke/TIA No 0   V Vascular Disease No 0   A Age 74-69 No,  (78 y.o.) 0   Sc Sex male 0    [id number]  Score  3   Score last updated 11/9/21 11:39 AM CST    Blood pressure little elevated today. Medical management includes ARB, beta-blocker and CCB. Remains anticoagulated on Eliquis with rhythm mostly controlled on Rythmol    Long conversation about stroke risk versus bleeding risk and bleeding/cost of Eliquis. Patient wishes to stop his anticoagulation. Did well for years with no problems on low-dose aspirin. I think we can carefully watch his A. fib burden with his device. We will recheck it in 1 month. Discussed that should he have any recurrent atrial fibrillation would recommend him going back on the Eliquis he is agreeable. In the meantime we will have him go back on low-dose aspirin    Has some ongoing RITTER that is fairly stable over the last year. We discussed multiple reasons to have shortness of breath with activity but he does recover well. Seems to have no fluid retention. States his last labs with his primary care were normal including blood counts. Previous smoker but quit 20 years ago. Has no wheezing or respiratory complaints. States taking medications as prescribed  Stable cardiovascular status. No evidence of overt heart failure, angina or dysrhythmia. 30 minutes were spent preparing, reviewing and seeing patient. All questions answered    Plan  Will check pacer in 1 mos for afib burden  Stop the Eliquis for now- if you have any more afib will restart it  Go back on the low dose aspirin  Let me know if the shortness of breath gets any worse  Follow up in 6 mos   Call with any questions or concerns  Follow up with Allison Finney MD for non cardiac problems  Report any new problems  Cardiovascular Fitness-Exercise as tolerated. Strive for 30 minutes of exercise most days of the week.     Cardiac / Healthy Diet  Continue current medications as directed  Continue plan of treatment  It is always recommended that you bring your medications bottles with you to each visit - this is for your safety! LESA Gleason dragon/transcription disclaimer: Much of this encounter note is electronic transcription/translation of spoken language to printed tach. Electronic translation of spoken language may be erroneous, or at times, nonsensical words or phrases may be inadvertently transcribed.  Although, I have reviewed the note for such errors, some may still exist.

## 2021-12-10 ENCOUNTER — TELEPHONE (OUTPATIENT)
Dept: CARDIOLOGY CLINIC | Age: 76
End: 2021-12-10

## 2022-01-12 DIAGNOSIS — I48.0 PAF (PAROXYSMAL ATRIAL FIBRILLATION) (HCC): ICD-10-CM

## 2022-01-12 DIAGNOSIS — Z95.0 PACEMAKER: Primary | ICD-10-CM

## 2022-01-12 DIAGNOSIS — I49.5 SICK SINUS SYNDROME (HCC): ICD-10-CM

## 2022-01-12 PROCEDURE — 93294 REM INTERROG EVL PM/LDLS PM: CPT | Performed by: CLINICAL NURSE SPECIALIST

## 2022-01-12 PROCEDURE — 93296 REM INTERROG EVL PM/IDS: CPT | Performed by: CLINICAL NURSE SPECIALIST

## 2022-01-24 ENCOUNTER — OFFICE VISIT (OUTPATIENT)
Dept: PRIMARY CARE CLINIC | Age: 77
End: 2022-01-24

## 2022-01-24 DIAGNOSIS — Z11.52 ENCOUNTER FOR SCREENING FOR COVID-19: Primary | ICD-10-CM

## 2022-01-24 LAB — SARS-COV-2, PCR: NOT DETECTED

## 2022-01-24 PROCEDURE — 99999 PR OFFICE/OUTPT VISIT,PROCEDURE ONLY: CPT | Performed by: NURSE PRACTITIONER

## 2022-04-13 DIAGNOSIS — I49.5 SICK SINUS SYNDROME (HCC): ICD-10-CM

## 2022-04-13 DIAGNOSIS — Z95.0 PACEMAKER: Primary | ICD-10-CM

## 2022-04-13 PROCEDURE — 93294 REM INTERROG EVL PM/LDLS PM: CPT | Performed by: CLINICAL NURSE SPECIALIST

## 2022-04-13 PROCEDURE — 93296 REM INTERROG EVL PM/IDS: CPT | Performed by: CLINICAL NURSE SPECIALIST

## 2022-05-12 ENCOUNTER — OFFICE VISIT (OUTPATIENT)
Dept: CARDIOLOGY CLINIC | Age: 77
End: 2022-05-12
Payer: MEDICARE

## 2022-05-12 VITALS
SYSTOLIC BLOOD PRESSURE: 124 MMHG | HEART RATE: 78 BPM | BODY MASS INDEX: 31.18 KG/M2 | DIASTOLIC BLOOD PRESSURE: 70 MMHG | WEIGHT: 243 LBS | HEIGHT: 74 IN

## 2022-05-12 DIAGNOSIS — Z95.0 PACEMAKER: ICD-10-CM

## 2022-05-12 DIAGNOSIS — I49.5 SICK SINUS SYNDROME (HCC): Primary | ICD-10-CM

## 2022-05-12 DIAGNOSIS — I25.10 CORONARY ARTERY DISEASE INVOLVING NATIVE CORONARY ARTERY OF NATIVE HEART WITHOUT ANGINA PECTORIS: ICD-10-CM

## 2022-05-12 DIAGNOSIS — I48.0 PAF (PAROXYSMAL ATRIAL FIBRILLATION) (HCC): ICD-10-CM

## 2022-05-12 PROCEDURE — 93288 INTERROG EVL PM/LDLS PM IP: CPT | Performed by: CLINICAL NURSE SPECIALIST

## 2022-05-12 PROCEDURE — 1123F ACP DISCUSS/DSCN MKR DOCD: CPT | Performed by: CLINICAL NURSE SPECIALIST

## 2022-05-12 PROCEDURE — 4040F PNEUMOC VAC/ADMIN/RCVD: CPT | Performed by: CLINICAL NURSE SPECIALIST

## 2022-05-12 PROCEDURE — 1036F TOBACCO NON-USER: CPT | Performed by: CLINICAL NURSE SPECIALIST

## 2022-05-12 PROCEDURE — G8417 CALC BMI ABV UP PARAM F/U: HCPCS | Performed by: CLINICAL NURSE SPECIALIST

## 2022-05-12 PROCEDURE — 99214 OFFICE O/P EST MOD 30 MIN: CPT | Performed by: CLINICAL NURSE SPECIALIST

## 2022-05-12 PROCEDURE — G8427 DOCREV CUR MEDS BY ELIG CLIN: HCPCS | Performed by: CLINICAL NURSE SPECIALIST

## 2022-05-12 NOTE — PROGRESS NOTES
[facility] Cardiology  [facility] [address]  [address]  Phone: [phone]  Fax: [phone]    OFFICE VISIT:  2022    [place]ver Bay - : 1945    Reason For Visit:  Bree Luu is a 67 y.o. male who is here for 6 Month Follow-Up (no cardiac symptoms), Irregular Heart Beat ([facility]   Pacemaker), and Coronary Artery Disease  History of nonocclusive coronary disease with last heart catheterization in  EF 45%. Pacemaker placed in  for sick sinus syndrome with paroxysmal atrial fibrillation controlled on Rythmol. Had generator replacement 3/25/2021    Complaining of some RITTER earlier last year. Had 2D echo that showed normal LV size with EF 55%. Mild concentric LVH with normal diastolic function. Mild to moderate LA enlargement no significant valvular disease. Mildly dilated ascending aortic measuring 3.6 cm    Patient is here today for routine follow-up and pacemaker interrogation. He states overall he is doing fairly well. Continues to have RITTER that has been less active over the last year. Recovers quickly. Has noticed occasional wheezing  He has not had any recurrent arrhythmias that he is aware. He has his follow-up next week at [facility] for his history of kidney cancer      Subjective  Glendal denies exertional chest pain, resting  shortness of breath, orthopnea, paroxysmal nocturnal dyspnea, syncope, presyncope, arrhythmia, edema and fatigue. The patient denies numbness or weakness to suggest cerebrovascular accident or transient ischemic attack. Winter Flores MD is PCP and follows labs .   El[place] Bay has the following history as [facility] in [facility]:    Patient Active Problem List    Diagnosis Date Noted    Alternating constipation and diarrhea 2021    Renal mass, right 2020    Dizziness 2018    RITTER (dyspnea on exertion) 2018    History of adenomatous polyp of colon 03/10/2016    Heme positive stool 2014    Rectal bleeding 2014    CAD (coronary artery disease) 2012    PAF (paroxysmal atrial fibrillation) ([facility] 10/30/2012    Hypertension     Pacemaker 2012    Sick sinus syndrome ([facility] 2012    Near syncope 06/10/2012     Past Medical History:   Diagnosis Date    CAD (coronary artery disease) 2012    Colon polyp     Diabetes mellitus ([facility]     GERD (gastroesophageal reflux disease)     Hyperlipidemia     Hypertension     Near syncope 6/10/2012    Pacemaker 12    PAF (paroxysmal atrial fibrillation) ([facility] 10/30/2012     Past Surgical History:   Procedure Laterality Date    APPENDECTOMY      CARDIAC CATHETERIZATION  12   JDT    EF  60%  with angioplasty    CHOLECYSTECTOMY, LAPAROSCOPIC  11/2/15    COLONOSCOPY  2015    Nancy: mult polyps, 5 were tubular adenomas    COLONOSCOPY N/A 3/10/2016    Dr Jolynn Dela Cruz AP x 3, dysplasia (-), HP x 1, 3 yr recall    COLONOSCOPY N/A 8/15/2019    Dr Karen Garcia hemorrhoids-Grade 2, diverticulosis, AP-5 yr recall    EYE SURGERY Bilateral     CATARACT    FRACTURE SURGERY      Nose    KIDNEY SURGERY      PACEMAKER PLACEMENT       Family History   Problem Relation Age of Onset    Heart Failure Mother          at age 80    Heart Disease Maternal Uncle     Colon Cancer Neg Hx     Colon Polyps Neg Hx     Esophageal Cancer Neg Hx     Liver Disease Neg Hx     Liver Cancer Neg Hx     Rectal Cancer Neg Hx     Stomach Cancer Neg Hx      Social History     Tobacco Use    Smoking status: Former Smoker     Packs/day: 0.00     Types: Cigarettes     Quit date: 2001     Years since quittin.8    Smokeless tobacco: Never Used   Substance Use Topics    Alcohol use: No      Current Outpatient Medications   Medication Sig Dispense Refill    aspirin EC 81 MG EC tablet Take 1 tablet by mouth daily 90 tablet 1    amLODIPine (NORVASC) 5 MG tablet TAKE 1 TABLET BY MOUTH  DAILY 90 tablet 3    metoprolol tartrate (LOPRESSOR) 25 MG tablet TAKE ONE-HALF TABLET BY  MOUTH TWICE DAILY 90 tablet 3    propafenone (RYTHMOL) 150 MG tablet TAKE 1 TABLET BY MOUTH  EVERY 8 HOURS 270 tablet 3    famotidine (PEPCID) 20 MG tablet Take 20 mg by mouth every evening      losartan (COZAAR) 100 MG tablet Take 0.5 tablet twice daily      metFORMIN (GLUCOPHAGE) 500 MG tablet Take 500 mg by mouth 2 times daily (with meals)       No current facility-administered medications for this visit. Allergies: Lisinopril    Review of Systems  Constitutional - no significant activity change, appetite change, or unexpected weight change. No fever, chills or diaphoresis. No fatigue. HEENT - no significant rhinorrhea or epistaxis. No tinnitus or significant hearing loss. Eyes - no sudden vision change or amaurosis. Respiratory - no significant wheezing, stridor, apnea or cough. + dyspnea on exertion . No resting shortness of breath. Cardiovascular - no exertional chest pain, orthopnea or PND. No sensation of arrhythmia or slow heart rate. No claudication or leg edema. Gastrointestinal - no abdominal swelling or pain. No blood in stool. No severe constipation, diarrhea, nausea, or vomiting. Genitourinary - no difficulty urinating, dysuria, frequency, or urgency. No flank pain or hematuria. Musculoskeletal - no back pain, gait disturbance, or myalgia. Skin - no color change or rash. No pallor. No new surgical incision. Neurologic - no speech difficulty, facial asymmetry or lateralizing weakness. No seizures, presyncope, syncope, or significant dizziness. Hematologic - + easy bruising no excessive bleeding. Psychiatric - no severe anxiety or insomnia. No confusion. All other review of systems are negative. Objective  Vital Signs - /70   Pulse 78   Ht 6' 2\" (1.88 m)   Wt 243 lb (110.2 kg)   BMI 31.20 kg/m²   General - Glendal is alert, cooperative, and pleasant. Well groomed. No acute distress. Body habitus is overweight.   HEENT - The head is normocephalic. No circumoral cyanosis. Dentition is normal.   EYES -  No Xanthelasma, no arcus senilis, no conjunctival hemorrhages or discharge. Neck - Supple, without increased jugular venous pressures. No carotid bruits. No mass. Respiratory - Lungs are clear bilaterally. No wheezes or rales. Normal effort without use of accessory muscles. Cardiovascular - Heart has regular rhythm and rate. No murmurs, rubs or gallops. + pedal pulses and no varicosities. Abdominal -  Soft, nontender, nondistended. Bowel sounds are intact. Extremities - No clubbing, cyanosis, or  edema. Musculoskeletal -  No clubbing . No Osler's nodes. Gait normal .  No kyphosis or scoliosis. Skin -  no statis ulcers or dermatitis. Neurological - No focal signs are identified. Oriented to person, place and time. Psychiatric -  Appropriate affect and mood. Assessment:     Diagnosis Orders   1. Sick sinus syndrome ([address]     2. PAF (paroxysmal atrial fibrillation) ([address]     3. Pacemaker     4. Coronary artery disease involving native coronary artery of native heart without angina pectoris       Data:  BP Readings from Last 3 Encounters:   05/12/22 124/70   11/09/21 (!) 142/84   08/23/21 (!) 170/91    Pulse Readings from Last 3 Encounters:   05/12/22 78   11/09/21 77   08/23/21 86        Wt Readings from Last 3 Encounters:   05/12/22 243 lb (110.2 kg)   11/09/21 238 lb (108 kg)   06/25/21 238 lb 6.4 oz (108.1 kg)       Pacemaker check showed adequate battery status- approximately 11 years   and  appropriate diagnostics without any sustained arrhythmias. There is been some ongoing noise and over sensing on atrial lead. Looking back has not had any true sustained atrial fibrillation. PVAB extended to help differentiate. Very small P waves.   Does pace atrially 99.6% and V paced 3.7%  Mode AAIR-DDDR at 60  Next check in 3 months via [employer] home monitoring system        [id number] Score for Atrial Fibrillation Stroke Risk   Risk   Factors  Component Value   C CHF No 0   H HTN Yes 1   A2 Age >= 76 Yes,  (78 y.o.) 2   D DM No 0   S2 Prior Stroke/TIA No 0   V Vascular Disease No 0   A Age 74-69 No,  (78 y.o.) 0   Sc Sex male 0    [id number]  Score  3   Score last updated 11/9/21 11:39 AM CST    . Medical management includes ARB, beta-blocker and CCB. rhythm  controlled on Rythmol  -No recurrent arrhythmia with recorded events noise on lead    Long conversation last office visit about stroke risk versus bleeding risk and bleeding/cost of Eliquis office visit  Wished to stop his anticoagulation and did so 6 months ago    I think we can carefully watch his A. fib burden with his device. Has some ongoing RITTER that is fairly stable over the last year. We discussed multiple reasons to have shortness of breath with activity but he does recover well. Seems to have no fluid retention. States his last labs with his primary care were normal including blood counts. Previous smoker but quit 20 years ago. Has a mild wheezing     States taking medications as prescribed  Stable cardiovascular status. No evidence of overt heart failure, angina or dysrhythmia. 30 minutes were spent preparing, reviewing and seeing patient. All questions answered    Plan    Will check pacer in 3 mos   Follow up in 6 mos   Call with any questions or concerns  Follow up with Shanta Yao MD for non cardiac problems and labs   Follow up with [place] as planned   Report any new problems  Cardiovascular Fitness-Exercise as tolerated. Strive for 30 minutes of exercise most days of the week. Cardiac / Healthy Diet  Continue current medications as directed  Continue plan of treatment  It is always recommended that you bring your medications bottles with you to each visit - this is for your safety!        LESA May    EMR dragon/transcription disclaimer: Much of this encounter note is electronic transcription/translation of spoken language to printed tach. Electronic translation of spoken language may be erroneous, or at times, nonsensical words or phrases may be inadvertently transcribed.  Although, I have reviewed the note for such errors, some may still exist.

## 2022-05-12 NOTE — PATIENT INSTRUCTIONS
Will check pacer in 3 mos   Follow up in 6 mos   Call with any questions or concerns  Follow up with Carter Muller MD for non cardiac problems and labs   Follow up with [place] as planned   Report any new problems  Cardiovascular Fitness-Exercise as tolerated. Strive for 30 minutes of exercise most days of the week. Cardiac / Healthy Diet  Continue current medications as directed  Continue plan of treatment  It is always recommended that you bring your medications bottles with you to each visit - this is for your safety!

## 2022-08-12 ENCOUNTER — APPOINTMENT (OUTPATIENT)
Dept: GENERAL RADIOLOGY | Age: 77
End: 2022-08-12
Payer: MEDICARE

## 2022-08-12 ENCOUNTER — APPOINTMENT (OUTPATIENT)
Dept: CT IMAGING | Age: 77
End: 2022-08-12
Payer: MEDICARE

## 2022-08-12 ENCOUNTER — HOSPITAL ENCOUNTER (EMERGENCY)
Age: 77
Discharge: HOME | End: 2022-08-12
Payer: MEDICARE

## 2022-08-12 VITALS
OXYGEN SATURATION: 96 % | HEIGHT: 74 IN | SYSTOLIC BLOOD PRESSURE: 139 MMHG | RESPIRATION RATE: 18 BRPM | HEART RATE: 60 BPM | DIASTOLIC BLOOD PRESSURE: 68 MMHG | BODY MASS INDEX: 30.8 KG/M2 | TEMPERATURE: 97.8 F | WEIGHT: 240 LBS

## 2022-08-12 DIAGNOSIS — R10.9 ABDOMINAL PAIN, UNSPECIFIED ABDOMINAL LOCATION: Primary | ICD-10-CM

## 2022-08-12 DIAGNOSIS — I49.5 SICK SINUS SYNDROME (HCC): ICD-10-CM

## 2022-08-12 DIAGNOSIS — N39.0 URINARY TRACT INFECTION WITHOUT HEMATURIA, SITE UNSPECIFIED: ICD-10-CM

## 2022-08-12 DIAGNOSIS — R55 NEAR SYNCOPE: ICD-10-CM

## 2022-08-12 DIAGNOSIS — Z95.0 PACEMAKER: Primary | ICD-10-CM

## 2022-08-12 LAB
ALBUMIN SERPL-MCNC: 4.5 G/DL (ref 3.5–5.2)
ALP BLD-CCNC: 45 U/L (ref 40–130)
ALT SERPL-CCNC: 24 U/L (ref 5–41)
ANION GAP SERPL CALCULATED.3IONS-SCNC: 11 MMOL/L (ref 7–19)
AST SERPL-CCNC: 21 U/L (ref 5–40)
BACTERIA: NEGATIVE /HPF
BASOPHILS ABSOLUTE: 0 K/UL (ref 0–0.2)
BASOPHILS RELATIVE PERCENT: 0.7 % (ref 0–1)
BILIRUB SERPL-MCNC: 0.5 MG/DL (ref 0.2–1.2)
BILIRUBIN URINE: NEGATIVE
BLOOD, URINE: NEGATIVE
BUN BLDV-MCNC: 22 MG/DL (ref 8–23)
CALCIUM SERPL-MCNC: 9.4 MG/DL (ref 8.8–10.2)
CHLORIDE BLD-SCNC: 107 MMOL/L (ref 98–111)
CLARITY: CLEAR
CO2: 24 MMOL/L (ref 22–29)
COLOR: YELLOW
CREAT SERPL-MCNC: 1.2 MG/DL (ref 0.5–1.2)
CRYSTALS, UA: ABNORMAL /HPF
EOSINOPHILS ABSOLUTE: 0.1 K/UL (ref 0–0.6)
EOSINOPHILS RELATIVE PERCENT: 1.8 % (ref 0–5)
EPITHELIAL CELLS, UA: 1 /HPF (ref 0–5)
EPITHELIAL CELLS, UA: ABNORMAL /HPF
GFR AFRICAN AMERICAN: >59
GFR NON-AFRICAN AMERICAN: 59
GLUCOSE BLD-MCNC: 186 MG/DL (ref 74–109)
GLUCOSE URINE: 100 MG/DL
HCT VFR BLD CALC: 45.6 % (ref 42–52)
HEMOGLOBIN: 13.9 G/DL (ref 14–18)
HYALINE CASTS: 1 /HPF (ref 0–8)
IMMATURE GRANULOCYTES #: 0 K/UL
KETONES, URINE: ABNORMAL MG/DL
LEUKOCYTE ESTERASE, URINE: ABNORMAL
LIPASE: 26 U/L (ref 13–60)
LYMPHOCYTES ABSOLUTE: 1.2 K/UL (ref 1.1–4.5)
LYMPHOCYTES RELATIVE PERCENT: 19.4 % (ref 20–40)
MCH RBC QN AUTO: 27.6 PG (ref 27–31)
MCHC RBC AUTO-ENTMCNC: 30.5 G/DL (ref 33–37)
MCV RBC AUTO: 90.7 FL (ref 80–94)
MONOCYTES ABSOLUTE: 0.2 K/UL (ref 0–0.9)
MONOCYTES RELATIVE PERCENT: 3.8 % (ref 0–10)
NEUTROPHILS ABSOLUTE: 4.5 K/UL (ref 1.5–7.5)
NEUTROPHILS RELATIVE PERCENT: 74 % (ref 50–65)
NITRITE, URINE: POSITIVE
PDW BLD-RTO: 14.4 % (ref 11.5–14.5)
PH UA: 5 (ref 5–8)
PLATELET # BLD: 160 K/UL (ref 130–400)
PMV BLD AUTO: 11 FL (ref 9.4–12.4)
POTASSIUM REFLEX MAGNESIUM: 4.5 MMOL/L (ref 3.5–5)
PROTEIN UA: ABNORMAL MG/DL
RBC # BLD: 5.03 M/UL (ref 4.7–6.1)
RBC UA: 0 /HPF (ref 0–4)
RBC UA: ABNORMAL /HPF (ref 0–2)
SODIUM BLD-SCNC: 142 MMOL/L (ref 136–145)
SPECIFIC GRAVITY UA: 1.02 (ref 1–1.03)
TOTAL PROTEIN: 7.2 G/DL (ref 6.6–8.7)
TROPONIN: <0.01 NG/ML (ref 0–0.03)
UROBILINOGEN, URINE: 0.2 E.U./DL
WBC # BLD: 6 K/UL (ref 4.8–10.8)
WBC UA: 11 /HPF (ref 0–5)
WBC UA: ABNORMAL /HPF (ref 0–5)
YEAST: PRESENT /HPF

## 2022-08-12 PROCEDURE — 81001 URINALYSIS AUTO W/SCOPE: CPT

## 2022-08-12 PROCEDURE — 93296 REM INTERROG EVL PM/IDS: CPT | Performed by: NURSE PRACTITIONER

## 2022-08-12 PROCEDURE — 85025 COMPLETE CBC W/AUTO DIFF WBC: CPT

## 2022-08-12 PROCEDURE — 80053 COMPREHEN METABOLIC PANEL: CPT

## 2022-08-12 PROCEDURE — 99285 EMERGENCY DEPT VISIT HI MDM: CPT

## 2022-08-12 PROCEDURE — 6360000004 HC RX CONTRAST MEDICATION: Performed by: PHYSICIAN ASSISTANT

## 2022-08-12 PROCEDURE — 70450 CT HEAD/BRAIN W/O DYE: CPT

## 2022-08-12 PROCEDURE — 36415 COLL VENOUS BLD VENIPUNCTURE: CPT

## 2022-08-12 PROCEDURE — 93005 ELECTROCARDIOGRAM TRACING: CPT | Performed by: PHYSICIAN ASSISTANT

## 2022-08-12 PROCEDURE — 93294 REM INTERROG EVL PM/LDLS PM: CPT | Performed by: NURSE PRACTITIONER

## 2022-08-12 PROCEDURE — 71045 X-RAY EXAM CHEST 1 VIEW: CPT

## 2022-08-12 PROCEDURE — 84484 ASSAY OF TROPONIN QUANT: CPT

## 2022-08-12 PROCEDURE — 83690 ASSAY OF LIPASE: CPT

## 2022-08-12 PROCEDURE — 70450 CT HEAD/BRAIN W/O DYE: CPT | Performed by: RADIOLOGY

## 2022-08-12 PROCEDURE — 74177 CT ABD & PELVIS W/CONTRAST: CPT

## 2022-08-12 PROCEDURE — 87086 URINE CULTURE/COLONY COUNT: CPT

## 2022-08-12 PROCEDURE — 71045 X-RAY EXAM CHEST 1 VIEW: CPT | Performed by: RADIOLOGY

## 2022-08-12 PROCEDURE — 74177 CT ABD & PELVIS W/CONTRAST: CPT | Performed by: RADIOLOGY

## 2022-08-12 RX ORDER — CEPHALEXIN 500 MG/1
500 CAPSULE ORAL 2 TIMES DAILY
Qty: 14 CAPSULE | Refills: 0 | Status: SHIPPED | OUTPATIENT
Start: 2022-08-12 | End: 2022-08-19

## 2022-08-12 RX ADMIN — IOPAMIDOL 70 ML: 755 INJECTION, SOLUTION INTRAVENOUS at 11:08

## 2022-08-12 ASSESSMENT — ENCOUNTER SYMPTOMS
BACK PAIN: 0
ABDOMINAL PAIN: 1
COUGH: 0
SHORTNESS OF BREATH: 0
CONSTIPATION: 0
DIARRHEA: 0
NAUSEA: 1
VOMITING: 0

## 2022-08-12 ASSESSMENT — PAIN DESCRIPTION - DESCRIPTORS: DESCRIPTORS: ACHING

## 2022-08-12 ASSESSMENT — PAIN - FUNCTIONAL ASSESSMENT
PAIN_FUNCTIONAL_ASSESSMENT: 0-10
PAIN_FUNCTIONAL_ASSESSMENT: NONE - DENIES PAIN

## 2022-08-12 ASSESSMENT — PAIN DESCRIPTION - LOCATION: LOCATION: ABDOMEN

## 2022-08-12 ASSESSMENT — PAIN SCALES - GENERAL: PAINLEVEL_OUTOF10: 2

## 2022-08-12 NOTE — ED NOTES
[place], [address] at bedside     Mynor Haley [place]  08/12/22 [address] Dennis Keith RN  08/12/22 [id number]

## 2022-08-12 NOTE — ED PROVIDER NOTES
[facility]  eMERGENCY dEPARTMENT eNCOUnter      Pt Name: Apolonia Bustillos  MRN: [id number]  [place] [birth year]  Date of evaluation: 8/12/2022  Provider: Donna Harman [id number]       Chief Complaint   Patient presents with    Abdominal Pain     Pt arrives to ED with c/o abdominal pain onset yesterday around 1300. Pt states nausea started this morning, no vomiting. Hypertension     Pt states he BP was 189/104 around 0800 this morning. Pt does take medication for his BP. Leg Pain     Pt states he has had difficulty standing since yesterday. Legs feel weak. HISTORY OF PRESENT ILLNESS   (Location/Symptom, Timing/Onset,Context/Setting, Quality, Duration, Modifying Factors, Severity)  Note limiting factors. Apolonia Bustillos is a 67 y.o. male with history including pacemaker placement, paroxysmal A. fib, CAD, hypertension, CAD, hyperlipidemia, diabetes, and GERD who presents to the emergency department with multiple complaints. The patient first notes abdominal pain. He had pain that began around 1 PM yesterday. He had nausea this morning without vomiting. He also complains of hypertension. He states his blood pressure has been elevated and was 189/104 around 8 AM this morning. He did take his medication for his blood pressure after this reading before he presented to the ER. He also complains of leg pain. The patient states that he has had episodes of weakness in his legs. He states that yesterday he was barely able to walk which has improved today. The patient denies any associated chest pain or shortness of breath. He does note an episode of diaphoresis and dizziness that occurred earlier this morning but has already resolved as well. HPI    NursingNotes were reviewed. REVIEW OF SYSTEMS    (2-9 systems for level 4, 10 or more for level 5)     Review of Systems   Constitutional:  Positive for diaphoresis (episodic, resolved). Negative for chills and fever.    HENT: Negative for congestion. Respiratory:  Negative for cough and shortness of breath. Cardiovascular:  Negative for chest pain. Gastrointestinal:  Positive for abdominal pain and nausea (dry heaving). Negative for constipation, diarrhea and vomiting. Musculoskeletal:  Negative for back pain and myalgias. Neurological:  Positive for dizziness (resolved) and weakness. Negative for syncope and headaches. All other systems reviewed and are negative.          PAST MEDICALHISTORY     Past Medical History:   Diagnosis Date    CAD (coronary artery disease) 12/6/2012    Colon polyp     Diabetes mellitus ([facility] [address]     GERD (gastroesophageal reflux disease)     Hyperlipidemia     Hypertension     Near syncope 6/10/2012    Pacemaker 6/11/12    PAF (paroxysmal atrial fibrillation) ([facility] [address] 10/30/2012         SURGICAL HISTORY       Past Surgical History:   Procedure Laterality Date    APPENDECTOMY      CARDIAC CATHETERIZATION  12/6/12   JDT    EF  60%  with angioplasty    CHOLECYSTECTOMY, LAPAROSCOPIC  11/2/15    COLONOSCOPY  1/2015    Nancy: mult polyps, 5 were tubular adenomas    COLONOSCOPY N/A 3/10/2016    Dr Armando Muniz AP x 3, dysplasia (-), HP x 1, 3 yr recall    COLONOSCOPY N/A 8/15/2019    Dr [place] hemorrhoids-Grade 2, diverticulosis, AP-5 yr recall    EYE SURGERY Bilateral     CATARACT    FRACTURE [facility]     Discharge Medication List as of 8/12/2022  3:41 PM        CONTINUE these medications which have NOT CHANGED    Details   aspirin EC 81 MG EC tablet Take 1 tablet by mouth daily, Disp-90 tablet, R-1OTC      amLODIPine (NORVASC) 5 MG tablet TAKE 1 TABLET BY MOUTH  DAILY, Disp-90 tablet, R-3Requesting 1 year supplyNormal      metoprolol tartrate (LOPRESSOR) 25 MG tablet TAKE ONE-HALF TABLET BY  MOUTH TWICE DAILY, Disp-90 tablet, R-3Requesting 1 year supplyNormal      propafenone (RYTHMOL) 150 MG tablet TAKE 1 TABLET BY MOUTH  EVERY 8 HOURS, Disp-270 tablet, R-3Requesting 1 year supplyNormal      famotidine (PEPCID) 20 MG tablet Take 20 mg by mouth every eveningHistorical Med      losartan (COZAAR) 100 MG tablet Take 0.5 tablet twice dailyHistorical Med      metFORMIN (GLUCOPHAGE) 500 MG tablet Take 500 mg by mouth 2 times daily (with meals)Historical Med             ALLERGIES     Lisinopril    FAMILY HISTORY       Family History   Problem Relation Age of Onset    Heart Failure Mother          at age 80    Heart Disease Maternal Uncle     Colon Cancer Neg Hx     Colon Polyps Neg Hx     Esophageal Cancer Neg Hx     Liver Disease Neg Hx     Liver Cancer Neg Hx     Rectal Cancer Neg Hx     Stomach Cancer Neg Hx           SOCIAL HISTORY       Social History     Socioeconomic History    Marital status:      Spouse name: None    Number of children: None    Years of education: None    Highest education level: None   Tobacco Use    Smoking status: Former     Packs/day: 0.00     Types: Cigarettes     Quit date: 2001     Years since quittin.1    Smokeless tobacco: Never   Vaping Use    Vaping Use: Never used   Substance and Sexual Activity    Alcohol use: No    Drug use: No       SCREENINGS    [place] Coma Scale  Eye Opening: Spontaneous  Best Verbal Response: Oriented  Best Motor Response: Obeys commands  [place] Coma Scale Score: 15        PHYSICAL EXAM    (up to 7 for level 4, 8 or more for level 5)     ED Triage Vitals [22 0904]   BP Temp Temp Source Heart Rate Resp SpO2 Height Weight   (!) 172/80 97.8 °F (36.6 °C) Oral 60 17 94 % 6' 2\" (1.88 m) 240 lb (108.9 kg)       Physical Exam  Vitals and nursing note reviewed. Constitutional:       General: He is not in acute distress. Appearance: He is well-developed. He is obese. He is not ill-appearing, toxic-appearing or diaphoretic. HENT:      Head: Normocephalic and atraumatic.       Mouth/Throat:      Mouth: Mucous membranes are moist.   Eyes: Extraocular Movements: Extraocular movements intact. Pupils: Pupils are equal, round, and reactive to light. Cardiovascular:      Rate and Rhythm: Normal rate and regular rhythm. Pulses: Normal pulses. Heart sounds: Normal heart sounds. Pulmonary:      Effort: Pulmonary effort is normal. No respiratory distress. Breath sounds: Normal breath sounds. Chest:      Chest wall: No tenderness. Abdominal:      General: Abdomen is flat. There is no distension. There are no signs of injury. Palpations: Abdomen is soft. There is no mass or pulsatile mass. Tenderness: There is generalized abdominal tenderness. There is no right CVA tenderness or left CVA tenderness. Musculoskeletal:      Cervical back: Normal range of motion and neck supple. No rigidity or tenderness. Right lower leg: No edema. Left lower leg: No edema. Lymphadenopathy:      Cervical: No cervical adenopathy. Skin:     General: Skin is warm and dry. Neurological:      General: No focal deficit present. Mental Status: He is alert and oriented to person, place, and time. Cranial Nerves: No cranial nerve deficit. Sensory: No sensory deficit. Motor: No weakness. Coordination: Coordination normal.      Gait: Gait normal.       DIAGNOSTIC RESULTS     EKG: All EKG's areinterpreted by the Emergency Department Physician who either signs or Co-signs this chart in the absence of a cardiologist.    EKG interpreted by attending, no STEMI or acute ischemia, RBBB, ventricular paced, rate 62, NC 57, Qtc 457    RADIOLOGY:  Non-plain film images such as CT, Ultrasound and MRI are read by the radiologist. Plain radiographic images are visualized and preliminarily interpreted bythe emergency physician with the below findings:    XR CHEST PORTABLE   Final Result   The lungs are grossly clear. Recommendation: Follow up as clinically indicated.     Electronically Signed by Vlad Gonzales MD at 12-Aug-2022 01:49:11 PM               CT ABDOMEN PELVIS W IV CONTRAST Additional Contrast? None   Final Result   1. The prostate is enlarged and bulges into the base of the bladder. 2. Mild fatty infiltration of the liver. 3. Probable bilateral renal cysts; the no hydronephrosis. 4. Fatty density in the duodenum could represent lipoma and/or bowel contents. Recommendation: Follow up as clinically indicated. All CT scans at this facility utilize dose modulation, iterative reconstruction, and/or weight based dosing when appropriate to reduce radiation dose to as low as reasonably achievable. Electronically Signed by Thuan Benson MD at 12-Aug-2022 01:07:19 PM               CT HEAD WO CONTRAST   Final Result   1. Chronic microvascular ischemic changes with age-appropriate parenchymal volume loss. 2. No evidence of acute cortical infarction or intracranial hemorrhage. Recommendation: Follow up as clinically indicated. All CT scans at this facility utilize dose modulation, iterative reconstruction, and/or weight based dosing when appropriate to reduce radiation dose to as low as reasonably achievable.     Electronically Signed by Thuan Benson MD at 12-Aug-2022 12:45:08 PM                       LABS:  Labs Reviewed   CBC WITH AUTO DIFFERENTIAL - Abnormal; Notable for the following components:       Result Value    Hemoglobin 13.9 (*)     MCHC 30.5 (*)     Neutrophils % 74.0 (*)     Lymphocytes % 19.4 (*)     All other components within normal limits   COMPREHENSIVE METABOLIC PANEL W/ REFLEX TO MG FOR LOW K - Abnormal; Notable for the following components:    Glucose 186 (*)     GFR Non- 59 (*)     All other components within normal limits   URINALYSIS WITH MICROSCOPIC - Abnormal; Notable for the following components:    Glucose, Ur 100 (*)     Ketones, Urine TRACE (*)     Protein, UA TRACE (*)     Nitrite, Urine POSITIVE (*)     Leukocyte Esterase, Urine SMALL (*)     Bacteria, UA NEGATIVE (*)     Yeast, UA Present (*)     Crystals, UA NEG (*)     WBC, UA 11 (*)     All other components within normal limits   CULTURE, URINE   TROPONIN   LIPASE       All other labs were within normal range or not returned as of this dictation. EMERGENCY DEPARTMENT COURSE and DIFFERENTIAL DIAGNOSIS/MDM:   Vitals:    Vitals:    08/12/22 0904 08/12/22 1000 08/12/22 1030 08/12/22 1540   BP: (!) 172/80 (!) 142/82 (!) 143/89 139/68   Pulse: 60 60 61 60   Resp: 17 16 17 18   Temp: 97.8 °F (36.6 °C)      TempSrc: Oral      SpO2: 94% 94% 94% 96%   Weight: 240 lb (108.9 kg)      Height: 6' 2\" (1.88 m)          MDM  Patient is a 69-year male presents the ER with complaint of abdominal pain, dizziness, and an episode of diaphoresis that occurred earlier today. On physical exam, he does not have any concerning unilateral weakness or neurodeficit. Due to his episode of near syncope, CT was obtained and was negative for acute intracranial process including stroke. Cardiac work-up was also obtained. EKG was negative for STEMI or acute ischemia. He had a negative troponin. As he is not having any active chest pain and his symptoms and his abdominal pain and symptoms have been going on for over 6 hours, no further trending of troponins are warranted. CBC does not show leukocytosis or significant anemia. CMP is negative for electrolyte disturbance, ANDREA, or LFT elevation. Lipase is within normal limits ruling out pancreatitis. Some signs of UTI on urinalysis so I will start on an antibiotic. I saw the urinalysis when I had already discharged the patient so I did call and leave a voicemail to  the antibiotic. CT of the abdomen was also obtained due to his tenderness. The prostate was enlarged but there was no other acute surgical abdominal process. The patient was offered admission today due to his heart score being elevated and his multiple complaints however, he declined.  He states their only granddaughter was killed in a car wreck last night and they will be traveling to [place] in the next few days. They verbalized understanding of return precautions and all his questions were answered. He has agreed to follow up with PCP. FINAL IMPRESSION      1. Abdominal pain, unspecified abdominal location    2.  Near syncope          DISPOSITION/PLAN   DISPOSITION Decision To Discharge 08/12/2022 03:37:14 PM      PATIENT REFERRED TO:  Pavithra Pittman [facility] [address] [id number] [phone]    In 3 days        DISCHARGE MEDICATIONS:  Discharge Medication List as of 8/12/2022  3:41 PM             (Please note that portions of this note were completed with a voice recognition program.  Efforts were made to edit thedictations but occasionally words are mis-transcribed.)    RAJAT Muniz (electronically signed)     Alexander Muniz  08/12/22 1910

## 2022-08-14 LAB — URINE CULTURE, ROUTINE: NORMAL

## 2022-08-15 LAB
EKG P AXIS: NORMAL DEGREES
EKG P-R INTERVAL: NORMAL MS
EKG Q-T INTERVAL: 458 MS
EKG QRS DURATION: 150 MS
EKG QTC CALCULATION (BAZETT): 461 MS
EKG T AXIS: 24 DEGREES

## 2022-08-15 PROCEDURE — 93010 ELECTROCARDIOGRAM REPORT: CPT | Performed by: INTERNAL MEDICINE

## 2022-09-19 DIAGNOSIS — I48.0 PAF (PAROXYSMAL ATRIAL FIBRILLATION) (HCC): ICD-10-CM

## 2022-09-19 DIAGNOSIS — I49.5 SICK SINUS SYNDROME (HCC): ICD-10-CM

## 2022-09-19 DIAGNOSIS — Z95.0 PACEMAKER: Primary | ICD-10-CM

## 2022-09-28 ENCOUNTER — HOSPITAL ENCOUNTER (OUTPATIENT)
Dept: NON INVASIVE DIAGNOSTICS | Age: 77
Discharge: HOME | End: 2022-09-28
Payer: MEDICARE

## 2022-09-28 DIAGNOSIS — G45.3 AMAUROSIS FUGAX: ICD-10-CM

## 2022-09-28 PROCEDURE — 93880 EXTRACRANIAL BILAT STUDY: CPT

## 2022-10-31 ENCOUNTER — OFFICE VISIT (OUTPATIENT)
Dept: ENT CLINIC | Age: 77
End: 2022-10-31
Payer: MEDICARE

## 2022-10-31 ENCOUNTER — PROCEDURE VISIT (OUTPATIENT)
Dept: ENT CLINIC | Age: 77
End: 2022-10-31
Payer: MEDICARE

## 2022-10-31 VITALS
WEIGHT: 230 LBS | SYSTOLIC BLOOD PRESSURE: 126 MMHG | BODY MASS INDEX: 29.52 KG/M2 | DIASTOLIC BLOOD PRESSURE: 68 MMHG | HEIGHT: 74 IN

## 2022-10-31 DIAGNOSIS — H90.3 SENSORINEURAL HEARING LOSS (SNHL) OF BOTH EARS: ICD-10-CM

## 2022-10-31 DIAGNOSIS — R42 DIZZINESS: Primary | ICD-10-CM

## 2022-10-31 DIAGNOSIS — H61.23 BILATERAL IMPACTED CERUMEN: ICD-10-CM

## 2022-10-31 DIAGNOSIS — R42 VERTIGO: Primary | ICD-10-CM

## 2022-10-31 DIAGNOSIS — H91.8X9 ASYMMETRICAL HEARING LOSS: ICD-10-CM

## 2022-10-31 PROCEDURE — 3078F DIAST BP <80 MM HG: CPT | Performed by: PHYSICIAN ASSISTANT

## 2022-10-31 PROCEDURE — 99203 OFFICE O/P NEW LOW 30 MIN: CPT | Performed by: PHYSICIAN ASSISTANT

## 2022-10-31 PROCEDURE — 1036F TOBACCO NON-USER: CPT | Performed by: PHYSICIAN ASSISTANT

## 2022-10-31 PROCEDURE — 92553 AUDIOMETRY AIR & BONE: CPT | Performed by: AUDIOLOGIST

## 2022-10-31 PROCEDURE — 1123F ACP DISCUSS/DSCN MKR DOCD: CPT | Performed by: PHYSICIAN ASSISTANT

## 2022-10-31 PROCEDURE — 92567 TYMPANOMETRY: CPT | Performed by: AUDIOLOGIST

## 2022-10-31 PROCEDURE — 3074F SYST BP LT 130 MM HG: CPT | Performed by: PHYSICIAN ASSISTANT

## 2022-10-31 PROCEDURE — 69210 REMOVE IMPACTED EAR WAX UNI: CPT | Performed by: PHYSICIAN ASSISTANT

## 2022-10-31 PROCEDURE — G8417 CALC BMI ABV UP PARAM F/U: HCPCS | Performed by: PHYSICIAN ASSISTANT

## 2022-10-31 PROCEDURE — G8484 FLU IMMUNIZE NO ADMIN: HCPCS | Performed by: PHYSICIAN ASSISTANT

## 2022-10-31 PROCEDURE — G8427 DOCREV CUR MEDS BY ELIG CLIN: HCPCS | Performed by: PHYSICIAN ASSISTANT

## 2022-10-31 RX ORDER — METHYLPREDNISOLONE 4 MG/1
TABLET ORAL
Qty: 1 KIT | Refills: 0 | Status: SHIPPED | OUTPATIENT
Start: 2022-10-31 | End: 2022-11-16 | Stop reason: ALTCHOICE

## 2022-10-31 RX ORDER — MECLIZINE HYDROCHLORIDE 25 MG/1
25 TABLET ORAL 3 TIMES DAILY PRN
Qty: 30 TABLET | Refills: 2 | Status: SHIPPED | OUTPATIENT
Start: 2022-10-31

## 2022-10-31 ASSESSMENT — ENCOUNTER SYMPTOMS
TROUBLE SWALLOWING: 0
SORE THROAT: 0
FACIAL SWELLING: 0
EYE PAIN: 0
SINUS PAIN: 0
VOICE CHANGE: 0
PHOTOPHOBIA: 0
SINUS PRESSURE: 0
RHINORRHEA: 0

## 2022-10-31 NOTE — PROGRESS NOTES
[facility] OTOLARYNGOLOGY/ENT  Mr. Radha Hoffman is a pleasant 58-year-old  male that was referred by Dr. David Weller due to problems with dizziness. Patient reports that over the last month he has had sporadic problems with dizziness that will last for a couple of hours. He reports that he cannot recall a certain trigger point. He does admit that by turning his head or bending over  will trigger his vertigo. He denies a family history of Ménière's disease. He also reports he has never had vertigo in the past.  He denies any history of traumatic head injury. CT of the head was recently performed and was reviewed. This demonstrated some microvascular disease with no evidence of ischemia. Carotid studies were noted to be with less than 50% stenosis. Audiology studies were completed today were reviewed with the patient. Patient was noted with normal to severe sloping sensorineural hearing loss bilaterally. Pure-tone testing demonstrated a left-sided asymmetry giving some suspicion for possible Ménière's. Tympanogram was type A bilaterally.       Allergies: Lisinopril      Current Outpatient Medications   Medication Sig Dispense Refill    methylPREDNISolone (MEDROL, HOLA,) 4 MG tablet Take by mouth as directed 1 kit 0    meclizine (ANTIVERT) 25 MG tablet Take 1 tablet by mouth 3 times daily as needed for Dizziness 30 tablet 2    aspirin EC 81 MG EC tablet Take 1 tablet by mouth daily 90 tablet 1    amLODIPine (NORVASC) 5 MG tablet TAKE 1 TABLET BY MOUTH  DAILY 90 tablet 3    metoprolol tartrate (LOPRESSOR) 25 MG tablet TAKE ONE-HALF TABLET BY  MOUTH TWICE DAILY 90 tablet 3    propafenone (RYTHMOL) 150 MG tablet TAKE 1 TABLET BY MOUTH  EVERY 8 HOURS 270 tablet 3    famotidine (PEPCID) 20 MG tablet Take 20 mg by mouth every evening      losartan (COZAAR) 100 MG tablet Take 0.5 tablet twice daily      metFORMIN (GLUCOPHAGE) 500 MG tablet Take 500 mg by mouth 2 times daily (with meals)       No current facility-administered medications for this visit. Past Surgical History:   Procedure Laterality Date    APPENDECTOMY      CARDIAC CATHETERIZATION  12   JDT    EF  60%  with angioplasty    CHOLECYSTECTOMY, LAPAROSCOPIC  11/2/15    COLONOSCOPY  2015    Nancy: mult polyps, 5 were tubular adenomas    COLONOSCOPY N/A 3/10/2016    Dr Edil Braxton AP x 3, dysplasia (-), HP x 1, 3 yr recall    COLONOSCOPY N/A 8/15/2019    Dr Beena Ndiaye hemorrhoids-Grade 2, diverticulosis, AP-5 yr recall    EYE SURGERY Bilateral     CATARACT    FRACTURE SURGERY      Nose    KIDNEY SURGERY      PACEMAKER PLACEMENT         Past Medical History:   Diagnosis Date    CAD (coronary artery disease) 2012    Colon polyp     Diabetes mellitus (HCC)     Dizziness     GERD (gastroesophageal reflux disease)     Hyperlipidemia     Hypertension     Near syncope 06/10/2012    Pacemaker 2012    [facility] 10[facility]30[facility]2012    Tinnitus        Family History   Problem Relation Age of Onset    Heart Failure Mother          at age 80    Heart Disease Maternal Uncle     Colon Cancer Neg Hx     Colon Polyps Neg Hx     Esophageal Cancer Neg Hx     Liver Disease Neg Hx     Liver Cancer Neg Hx     Rectal Cancer Neg Hx     Stomach Cancer Neg Hx        Social History     Tobacco Use    Smoking status: Former     Packs/day: 0.00     Types: Cigarettes     Quit date: 2001     Years since quittin.3    Smokeless tobacco: Never   Substance Use Topics    Alcohol use: No           REVIEW OF SYSTEMS:  all other systems reviewed and are negative  Review of Systems   Constitutional:  Negative for chills and fever. HENT:  Negative for congestion, dental problem, ear discharge, ear pain, facial swelling, hearing loss, postnasal drip, rhinorrhea, sinus pressure, sinus pain, sore throat, tinnitus, trouble swallowing and voice change. Eyes:  Negative for photophobia and pain.    Neurological: Negative for dizziness and headaches. Comments:     PHYSICAL EXAM:    /68   Ht 6' 2\" (1.88 m)   Wt 230 lb (104.3 kg)   BMI 29.53 kg/m²   Body mass index is 29.53 kg/m². General Appearance: well developed  and well nourished  Head/ Face: normocephalic and atraumatic  Vocal Quality: good/ normal  Ears: Right Ear: External: external ears normal Otoscopy Ear Canal: cerumen impaction Otoscopy TM: TM's normal and TM's mobile Left Ear: External: external ears normal Otoscopy Ear Canal: cerumen impaction Otoscopy TM: TM's normal and TM's mobile  Hearing: grossly intact  Nose: nares normal and septum midline  Neck: supple and adenopathy none palpable  Thyroid: normal and nodules No  The pupils were noted be equal round reactive to light accommodation with extraocular muscles intact bilaterally. No lateral nystagmus was noted bilaterally. Oral exam demonstrated the tongue to be midline with no abnormalities to the posterior pharynx. Assessment & Plan:    Problem List Items Addressed This Visit       Bilateral impacted cerumen     Bilateral cerumen impaction- successfully removed with microscopic guidance         Relevant Orders    58049 - ME REMOVE IMPACTED EAR WAX (Completed)    Vertigo - Primary     Vertigo/dizziness secondary to vestibulitis  Plan: I will place the patient on prednisone therapy with meclizine to utilize as needed. He is to follow-up in 2 weeks after therapy is been completed. Orders Placed This Encounter   Procedures    23414 - ME REMOVE IMPACTED EAR WAX     With microscopic guidance, the cerumen impaction was removed bilaterally with assistance of alligator graspers and suction. After removal of the TM appeared to be normal with no evidence of perforation or infection.        Orders Placed This Encounter   Medications    methylPREDNISolone (MEDROL, HOLA,) 4 MG tablet     Sig: Take by mouth as directed     Dispense:  1 kit     Refill:  0    meclizine (ANTIVERT) 25 MG tablet     Sig: Take 1 tablet by mouth 3 times daily as needed for Dizziness     Dispense:  30 tablet     Refill:  2         Electronically signed by Dre Garcia PA-C on 10/31/22 at 1:47 PM CDT        Please note that this chart was generated using dragon dictation software. Although every effort was made to ensure the accuracy of this automated transcription, some errors in transcription may have occurred.

## 2022-10-31 NOTE — ASSESSMENT & PLAN NOTE
Vertigo/dizziness secondary to vestibulitis  Plan: I will place the patient on prednisone therapy with meclizine to utilize as needed. He is to follow-up in 2 weeks after therapy is been completed.

## 2022-11-15 ENCOUNTER — TELEPHONE (OUTPATIENT)
Dept: CARDIOLOGY CLINIC | Age: 77
End: 2022-11-15

## 2022-11-16 ENCOUNTER — OFFICE VISIT (OUTPATIENT)
Dept: CARDIOLOGY CLINIC | Age: 77
End: 2022-11-16
Payer: MEDICARE

## 2022-11-16 VITALS
WEIGHT: 240 LBS | SYSTOLIC BLOOD PRESSURE: 130 MMHG | HEIGHT: 74 IN | HEART RATE: 82 BPM | BODY MASS INDEX: 30.8 KG/M2 | DIASTOLIC BLOOD PRESSURE: 84 MMHG

## 2022-11-16 DIAGNOSIS — I49.5 SICK SINUS SYNDROME (HCC): Primary | ICD-10-CM

## 2022-11-16 DIAGNOSIS — Z95.0 PACEMAKER: ICD-10-CM

## 2022-11-16 DIAGNOSIS — I48.0 PAF (PAROXYSMAL ATRIAL FIBRILLATION) (HCC): ICD-10-CM

## 2022-11-16 DIAGNOSIS — I25.10 CORONARY ARTERY DISEASE INVOLVING NATIVE CORONARY ARTERY OF NATIVE HEART WITHOUT ANGINA PECTORIS: ICD-10-CM

## 2022-11-16 DIAGNOSIS — I10 ESSENTIAL HYPERTENSION: ICD-10-CM

## 2022-11-16 PROCEDURE — G8417 CALC BMI ABV UP PARAM F/U: HCPCS | Performed by: CLINICAL NURSE SPECIALIST

## 2022-11-16 PROCEDURE — 1123F ACP DISCUSS/DSCN MKR DOCD: CPT | Performed by: CLINICAL NURSE SPECIALIST

## 2022-11-16 PROCEDURE — G8484 FLU IMMUNIZE NO ADMIN: HCPCS | Performed by: CLINICAL NURSE SPECIALIST

## 2022-11-16 PROCEDURE — 3078F DIAST BP <80 MM HG: CPT | Performed by: CLINICAL NURSE SPECIALIST

## 2022-11-16 PROCEDURE — 99214 OFFICE O/P EST MOD 30 MIN: CPT | Performed by: CLINICAL NURSE SPECIALIST

## 2022-11-16 PROCEDURE — G8427 DOCREV CUR MEDS BY ELIG CLIN: HCPCS | Performed by: CLINICAL NURSE SPECIALIST

## 2022-11-16 PROCEDURE — 93280 PM DEVICE PROGR EVAL DUAL: CPT | Performed by: CLINICAL NURSE SPECIALIST

## 2022-11-16 PROCEDURE — 3074F SYST BP LT 130 MM HG: CPT | Performed by: CLINICAL NURSE SPECIALIST

## 2022-11-16 PROCEDURE — 1036F TOBACCO NON-USER: CPT | Performed by: CLINICAL NURSE SPECIALIST

## 2022-11-16 NOTE — PROGRESS NOTES
[facility]  [facility]  [address]  Phone: [phone]  Fax: [phone]    OFFICE VISIT:  2022    Brenda Marcos - : 1945    Reason For Visit:  Cari Vazquez is a 65 y.o. male who is here for 6 Month Follow-Up (No cardiac symptoms), Irregular Heart Beat (Pacemaker), and Coronary Artery Disease  History of nonocclusive coronary disease with last heart catheterization in  EF 45%. Pacemaker placed in  for sick sinus syndrome with paroxysmal atrial fibrillation controlled on Rythmol. Had generator replacement 3/25/2021    Complaining of some RITTER earlier last year. Had 2D echo that showed normal LV size with EF 55%. Mild concentric LVH with normal diastolic function. Mild to moderate LA enlargement no significant valvular disease. Mildly dilated ascending aortic measuring 3.6 cm    Patient is here today for routine follow-up and pacemaker interrogation. He states overall he is doing fairly well. Did have an episode of urgent abdominal cramping and diarrhea while driving. Became very weak after bowel movement. He has had recurrent episodes of sudden onset of weakness and dizziness. Saw ENT and concern for vertigo. Was given meclizine. He has had no recent recurrent events  He has not noticed any arrhythmia. States blood pressures been controlled        Subjective  Glendal denies exertional chest pain, resting  shortness of breath, orthopnea, paroxysmal nocturnal dyspnea, syncope, presyncope, arrhythmia, edema and fatigue. The patient denies numbness or weakness to suggest cerebrovascular accident or transient ischemic attack. River Tsai MD is PCP and follows labs .   Brenda Marcos has the following history as recorded in [facility]:    Patient Active Problem List    Diagnosis Date Noted    Bilateral impacted cerumen 10/31/2022    Alternating constipation and diarrhea 2021    Renal mass, right 2020    Vertigo 2018    RITTER (dyspnea on exertion) 2018    History of adenomatous polyp of colon 03/10/2016    Heme positive stool 2014    Rectal bleeding 2014    CAD (coronary artery disease) 2012    PAF (paroxysmal atrial fibrillation) ([facility] 10/30/2012    Hypertension     Pacemaker 2012    Sick sinus syndrome ([facility] 2012    Near syncope 06/10/2012     Past Medical History:   Diagnosis Date    CAD (coronary artery disease) 2012    Colon polyp     Diabetes mellitus ([facility]     Dizziness     GERD (gastroesophageal reflux disease)     Hyperlipidemia     Hypertension     Near syncope 06/10/2012    Pacemaker 2012    PAF (paroxysmal atrial fibrillation) ([facility] 10/30/2012    Tinnitus      Past Surgical History:   Procedure Laterality Date    APPENDECTOMY      CARDIAC CATHETERIZATION  12   JDT    EF  60%  with angioplasty    CHOLECYSTECTOMY, LAPAROSCOPIC  11/2/15    COLONOSCOPY  2015    Nancy: mult polyps, 5 were tubular adenomas    COLONOSCOPY N/A 3/10/2016    Dr Marvin Cline AP x 3, dysplasia (-), HP x 1, 3 yr recall    COLONOSCOPY N/A 8/15/2019    Dr Cassie Cueva hemorrhoids-Grade 2, diverticulosis, AP-5 yr recall    EYE SURGERY Bilateral     CATARACT    FRACTURE SURGERY      Nose    KIDNEY SURGERY      PACEMAKER PLACEMENT       Family History   Problem Relation Age of Onset    Heart Failure Mother          at age 80    Heart Disease Maternal Uncle     Colon Cancer Neg Hx     Colon Polyps Neg Hx     Esophageal Cancer Neg Hx     Liver Disease Neg Hx     Liver Cancer Neg Hx     Rectal Cancer Neg Hx     Stomach Cancer Neg Hx      Social History     Tobacco Use    Smoking status: Former     Packs/day: 0.00     Types: Cigarettes     Quit date: 2001     Years since quittin.4    Smokeless tobacco: Never   Substance Use Topics    Alcohol use: No      Current Outpatient Medications   Medication Sig Dispense Refill    meclizine (ANTIVERT) 25 MG tablet Take 1 tablet by mouth 3 times daily as needed for Dizziness 30 tablet 2    aspirin EC 81 MG EC tablet Take 1 tablet by mouth daily 90 tablet 1    amLODIPine (NORVASC) 5 MG tablet TAKE 1 TABLET BY MOUTH  DAILY 90 tablet 3    metoprolol tartrate (LOPRESSOR) 25 MG tablet TAKE ONE-HALF TABLET BY  MOUTH TWICE DAILY 90 tablet 3    propafenone (RYTHMOL) 150 MG tablet TAKE 1 TABLET BY MOUTH  EVERY 8 HOURS 270 tablet 3    famotidine (PEPCID) 20 MG tablet Take 20 mg by mouth every evening      losartan (COZAAR) 100 MG tablet Take 0.5 tablet twice daily      metFORMIN (GLUCOPHAGE) 500 MG tablet Take 500 mg by mouth 2 times daily (with meals)      methylPREDNISolone (MEDROL, HOLA,) 4 MG tablet Take by mouth as directed (Patient not taking: Reported on 11/16/2022) 1 kit 0     No current facility-administered medications for this visit. Allergies: Lisinopril    Review of Systems  Constitutional - no significant activity change, appetite change, or unexpected weight change. No fever, chills or diaphoresis. No fatigue. HEENT - no significant rhinorrhea or epistaxis. No tinnitus or significant hearing loss. Eyes - no sudden vision change or amaurosis. Respiratory - no significant wheezing, stridor, apnea or cough. + dyspnea on exertion . No resting shortness of breath. Cardiovascular - no exertional chest pain, orthopnea or PND. No sensation of arrhythmia or slow heart rate. No claudication or leg edema. Gastrointestinal - no abdominal swelling or pain. No blood in stool. No severe constipation, diarrhea, nausea, or vomiting. Genitourinary - no difficulty urinating, dysuria, frequency, or urgency. No flank pain or hematuria. Musculoskeletal - no back pain, gait disturbance, or myalgia. + Lower extremity weakness at times  Skin - no color change or rash. No pallor. No new surgical incision. Neurologic - no speech difficulty, facial asymmetry or lateralizing weakness. No seizures, presyncope, syncope, or significant dizziness.   Hematologic - + no easy bruising no excessive bleeding. Psychiatric - no severe anxiety or insomnia. No confusion. All other review of systems are negative. Objective  Vital Signs - /84   Pulse 82   Ht 6' 2\" (1.88 m)   Wt 240 lb (108.9 kg)   BMI 30.81 kg/m²   General - Glendal is alert, cooperative, and pleasant. Well groomed. No acute distress. Body habitus is overweight. HEENT - The head is normocephalic. No circumoral cyanosis. Dentition is normal.   EYES -  No Xanthelasma, no arcus senilis, no conjunctival hemorrhages or discharge. Neck - Supple, without increased jugular venous pressures. No carotid bruits. No mass. Respiratory - Lungs are clear bilaterally. No wheezes or rales. Normal effort without use of accessory muscles. Cardiovascular - Heart has regular rhythm and rate. No murmurs, rubs or gallops. + pedal pulses and no varicosities. Abdominal -  Soft, nontender, nondistended. Bowel sounds are intact. Extremities - No clubbing, cyanosis, or  edema. Musculoskeletal -  No clubbing . No Osler's nodes. Gait normal .  No kyphosis or scoliosis. Skin -  no statis ulcers or dermatitis. Neurological - No focal signs are identified. Oriented to person, place and time. Psychiatric -  Appropriate affect and mood. Assessment:     Diagnosis Orders   1. Sick sinus syndrome ([address]        2. Pacemaker        3. PAF (paroxysmal atrial fibrillation) ([address]        4. Coronary artery disease involving native coronary artery of native heart without angina pectoris        5.  Essential hypertension          Data:  BP Readings from Last 3 Encounters:   11/16/22 130/84   10/31/22 126/68   08/12/22 139/68    Pulse Readings from Last 3 Encounters:   11/16/22 82   08/12/22 60   05/12/22 78        Wt Readings from Last 3 Encounters:   11/16/22 240 lb (108.9 kg)   10/31/22 230 lb (104.3 kg)   08/12/22 240 lb (108.9 kg)       Pacemaker check showed adequate battery status- approximately 10.6 years   and  appropriate diagnostics without any sustained arrhythmias. Episodes of atrial fibrillation questionable whether true arrhythmia due to  some ongoing noise and over sensing on atrial lead. Looking back has not had any true sustained atrial fibrillation. PVAB extended to help differentiate. Very small P waves. Does pace atrially 99.4% and V paced 5.2%  Mode AAIR-DDDR at 60  Next check in 3 months via [employer] home monitoring system        [id number] Score for Atrial Fibrillation Stroke Risk   Risk   Factors  Component Value   C CHF No 0   H HTN Yes 1   A2 Age >= 76 Yes,  (73 y.o.) 2   D DM No 0   S2 Prior Stroke/TIA Yes 2   V Vascular Disease No 0   A Age 74-69 No,  (73 y.o.) 0   Sc Sex male 0    [id number]  Score  5   Score last updated 11/9/21 11:39 AM CST    . Medical management includes ARB, beta-blocker and CCB. rhythm  controlled on Rythmol  -No recurrent arrhythmia with recorded events noise on lead    At last office visit patient wished to stop anticoagulation. Has had no arrhythmias noted on pacemaker. Can follow carefully for any recurrent arrhythmia     States taking medications as prescribed  Stable cardiovascular status. No evidence of overt heart failure, angina or dysrhythmia. 30 minutes were spent preparing, reviewing and seeing patient. All questions answered    Plan    Will check pacer in 3 mos   Follow up in 6 mos   Call with any questions or concerns  Follow up with Houston Martinez MD for non cardiac problems and labs   Follow up with [place] as planned   Report any new problems  Cardiovascular Fitness-Exercise as tolerated. Strive for 30 minutes of exercise most days of the week. Cardiac / Healthy Diet  Continue current medications as directed  Continue plan of treatment  It is always recommended that you bring your medications bottles with you to each visit - this is for your safety!        LESA Avalos    EMR dragon/transcription disclaimer: Much of this encounter note is electronic transcription/translation of spoken language to printed tach. Electronic translation of spoken language may be erroneous, or at times, nonsensical words or phrases may be inadvertently transcribed.  Although, I have reviewed the note for such errors, some may still exist.

## 2022-11-16 NOTE — PATIENT INSTRUCTIONS
Will check pacer in 3 mos   Follow up in 6 mos   Call with any questions or concerns  Follow up with Rosamaria Thurston MD for non cardiac problems and labs   Follow up with [facility] as planned   Report any new problems  Cardiovascular Fitness-Exercise as tolerated. Strive for 30 minutes of exercise most days of the week. Cardiac / Healthy Diet  Continue current medications as directed  Continue plan of treatment  It is always recommended that you bring your medications bottles with you to each visit - this is for your safety!

## 2022-11-17 DIAGNOSIS — I10 ESSENTIAL HYPERTENSION: ICD-10-CM

## 2022-11-18 RX ORDER — AMLODIPINE BESYLATE 5 MG/1
5 TABLET ORAL DAILY
Qty: 90 TABLET | Refills: 3 | Status: SHIPPED | OUTPATIENT
Start: 2022-11-18

## 2022-12-06 ENCOUNTER — OFFICE VISIT (OUTPATIENT)
Dept: ENT CLINIC | Age: 77
End: 2022-12-06
Payer: MEDICARE

## 2022-12-06 VITALS
WEIGHT: 238 LBS | BODY MASS INDEX: 30.54 KG/M2 | HEIGHT: 74 IN | DIASTOLIC BLOOD PRESSURE: 80 MMHG | SYSTOLIC BLOOD PRESSURE: 128 MMHG

## 2022-12-06 DIAGNOSIS — Z79.899 ENCOUNTER FOR MONITORING DIURETIC THERAPY: Primary | ICD-10-CM

## 2022-12-06 DIAGNOSIS — Z51.81 ENCOUNTER FOR MONITORING DIURETIC THERAPY: Primary | ICD-10-CM

## 2022-12-06 PROCEDURE — 99213 OFFICE O/P EST LOW 20 MIN: CPT | Performed by: PHYSICIAN ASSISTANT

## 2022-12-06 PROCEDURE — 3078F DIAST BP <80 MM HG: CPT | Performed by: PHYSICIAN ASSISTANT

## 2022-12-06 PROCEDURE — G8484 FLU IMMUNIZE NO ADMIN: HCPCS | Performed by: PHYSICIAN ASSISTANT

## 2022-12-06 PROCEDURE — 1123F ACP DISCUSS/DSCN MKR DOCD: CPT | Performed by: PHYSICIAN ASSISTANT

## 2022-12-06 PROCEDURE — G8417 CALC BMI ABV UP PARAM F/U: HCPCS | Performed by: PHYSICIAN ASSISTANT

## 2022-12-06 PROCEDURE — 3074F SYST BP LT 130 MM HG: CPT | Performed by: PHYSICIAN ASSISTANT

## 2022-12-06 PROCEDURE — G8427 DOCREV CUR MEDS BY ELIG CLIN: HCPCS | Performed by: PHYSICIAN ASSISTANT

## 2022-12-06 PROCEDURE — 1036F TOBACCO NON-USER: CPT | Performed by: PHYSICIAN ASSISTANT

## 2022-12-06 RX ORDER — METHYLPREDNISOLONE 4 MG/1
TABLET ORAL
Qty: 1 KIT | Refills: 0 | Status: SHIPPED | OUTPATIENT
Start: 2022-12-06

## 2022-12-06 RX ORDER — TRIAMTERENE AND HYDROCHLOROTHIAZIDE 37.5; 25 MG/1; MG/1
1 TABLET ORAL DAILY
Qty: 30 TABLET | Refills: 2 | Status: SHIPPED | OUTPATIENT
Start: 2022-12-06

## 2022-12-06 NOTE — PROGRESS NOTES
Mr. Jabari Christine is a pleasant 69-year-old  male that presents with complaints of recurrent vertigo. Patient reported that the prednisone and meclizine work great with resolution of his vertigo until about a week after he completed his prednisone. He reports that the vertigo will occur sporadically and will last sometimes half day to a full day. He reports he has had no issues with headaches or vision changes. His previous audiology screening demonstrated some mild asymmetry to the left ear given suspicion for possible Ménière's disease. Patient also complains of sporadic frog noises that only occurs to the left ear. He reports that this is triggered when he has a bump in the road or vigorously coughs. Physical examination with the microscope revealed the patient to have recurrent cerumen to the left ear that was cleaned. Unfortunately his canals not be very long and narrow giving difficulty visualizing the TM. I was able to visualize most of the TM which demonstrated no evidence of a tumor or evidence of infection. Examination of the right ear demonstrated a small amount of cerumen that was also removed. The TM appeared to be normal with better visualization. Neck exam demonstrated no lymphadenopathy. Impression: Recurrent vertigo suspicious for Ménière's disease    Plan: I will place the patient on another round of prednisone and start him on Maxide daily for his Ménière's disease. Patient was advised to have lab work done in 2 weeks for a serum potassium level. He is to follow-up with me in 1 month. If the patient remains symptomatic, would consider getting a CT of the left IAC.       Electronically signed by Alejandrina Landa PA-C on 12/6/22 at 12:08 PM CST

## 2022-12-19 DIAGNOSIS — Z95.0 PACEMAKER: ICD-10-CM

## 2022-12-19 DIAGNOSIS — I48.0 PAF (PAROXYSMAL ATRIAL FIBRILLATION) (HCC): ICD-10-CM

## 2022-12-19 RX ORDER — PROPAFENONE HYDROCHLORIDE 150 MG/1
TABLET, FILM COATED ORAL
Qty: 270 TABLET | Refills: 3 | Status: SHIPPED | OUTPATIENT
Start: 2022-12-19

## 2023-01-10 ENCOUNTER — OFFICE VISIT (OUTPATIENT)
Dept: ENT CLINIC | Age: 78
End: 2023-01-10
Payer: MEDICARE

## 2023-01-10 VITALS
BODY MASS INDEX: 30.54 KG/M2 | DIASTOLIC BLOOD PRESSURE: 76 MMHG | WEIGHT: 238 LBS | SYSTOLIC BLOOD PRESSURE: 110 MMHG | HEIGHT: 74 IN

## 2023-01-10 DIAGNOSIS — R42 VERTIGO: ICD-10-CM

## 2023-01-10 DIAGNOSIS — H93.A2 PULSATILE TINNITUS OF LEFT EAR: Primary | ICD-10-CM

## 2023-01-10 PROCEDURE — 1036F TOBACCO NON-USER: CPT | Performed by: PHYSICIAN ASSISTANT

## 2023-01-10 PROCEDURE — G8427 DOCREV CUR MEDS BY ELIG CLIN: HCPCS | Performed by: PHYSICIAN ASSISTANT

## 2023-01-10 PROCEDURE — 3078F DIAST BP <80 MM HG: CPT | Performed by: PHYSICIAN ASSISTANT

## 2023-01-10 PROCEDURE — 3074F SYST BP LT 130 MM HG: CPT | Performed by: PHYSICIAN ASSISTANT

## 2023-01-10 PROCEDURE — 1123F ACP DISCUSS/DSCN MKR DOCD: CPT | Performed by: PHYSICIAN ASSISTANT

## 2023-01-10 PROCEDURE — 99213 OFFICE O/P EST LOW 20 MIN: CPT | Performed by: PHYSICIAN ASSISTANT

## 2023-01-10 PROCEDURE — G8417 CALC BMI ABV UP PARAM F/U: HCPCS | Performed by: PHYSICIAN ASSISTANT

## 2023-01-10 PROCEDURE — G8484 FLU IMMUNIZE NO ADMIN: HCPCS | Performed by: PHYSICIAN ASSISTANT

## 2023-01-10 ASSESSMENT — ENCOUNTER SYMPTOMS
TROUBLE SWALLOWING: 0
SORE THROAT: 0
RHINORRHEA: 0
FACIAL SWELLING: 0
SINUS PAIN: 0
EYE PAIN: 0
PHOTOPHOBIA: 0
SINUS PRESSURE: 0
VOICE CHANGE: 0

## 2023-01-10 NOTE — PROGRESS NOTES
[facility]  Mr. Stacy Burris is a pleasant 65-year-old  male that presents for 1 month follow-up after doing a trial therapy for possible Ménière's disease. Patient was noted to have dizziness with asymmetrical hearing changes to the left ear. Patient reports that he still is having hissing sound from the left ear and reports that the medicine did not help with his vertigo. He continues to report that the vertigo sometimes will last 2 to 3 days. He has noticed that this occurs when he stands in [employer]. He has had a previous CAT scan of the head without contrast that was unremarkable for any findings. At this point the patient has not seen neurology. Allergies: Lisinopril      Current Outpatient Medications   Medication Sig Dispense Refill    propafenone (RYTHMOL) 150 MG tablet TAKE 1 TABLET BY MOUTH  EVERY 8 HOURS 270 tablet 3    methylPREDNISolone (MEDROL, HOLA,) 4 MG tablet Take by mouth as directed 1 kit 0    triamterene-hydroCHLOROthiazide (MAXZIDE-25) 37.5-25 MG per tablet Take 1 tablet by mouth daily 30 tablet 2    amLODIPine (NORVASC) 5 MG tablet TAKE 1 TABLET BY MOUTH  DAILY 90 tablet 3    metoprolol tartrate (LOPRESSOR) 25 MG tablet TAKE ONE-HALF TABLET BY  MOUTH TWICE DAILY 90 tablet 3    meclizine (ANTIVERT) 25 MG tablet Take 1 tablet by mouth 3 times daily as needed for Dizziness 30 tablet 2    aspirin EC 81 MG EC tablet Take 1 tablet by mouth daily 90 tablet 1    famotidine (PEPCID) 20 MG tablet Take 20 mg by mouth every evening      losartan (COZAAR) 100 MG tablet Take 0.5 tablet twice daily      metFORMIN (GLUCOPHAGE) 500 MG tablet Take 500 mg by mouth 2 times daily (with meals)       No current facility-administered medications for this visit.        Past Surgical History:   Procedure Laterality Date    APPENDECTOMY      CARDIAC CATHETERIZATION  12/6/12   JDT    EF  60%  with angioplasty    CHOLECYSTECTOMY, LAPAROSCOPIC  11/2/15    COLONOSCOPY  1/2015    Nancy: allison polyps, 5 were tubular adenomas    COLONOSCOPY N/A 3/10/2016    Dr Maximilian Merida AP x 3, dysplasia (-), HP x 1, 3 yr recall    COLONOSCOPY N/A 8/15/2019    Dr Deana Howard hemorrhoids-Grade 2, diverticulosis, AP-5 yr recall    EYE SURGERY Bilateral     CATARACT    FRACTURE SURGERY      Nose    KIDNEY SURGERY      PACEMAKER PLACEMENT         Past Medical History:   Diagnosis Date    CAD (coronary artery disease) 2012    Colon polyp     Diabetes mellitus (HCC)     Dizziness     GERD (gastroesophageal reflux disease)     Hyperlipidemia     Hypertension     Near syncope 06/10/2012    Pacemaker 2012    PAF (paroxysmal atrial fibrillation) ([facility] 10/30/2012    Tinnitus        Family History   Problem Relation Age of Onset    Heart Failure Mother          at age 80    Heart Disease Maternal Uncle     Colon Cancer Neg Hx     Colon Polyps Neg Hx     Esophageal Cancer Neg Hx     Liver Disease Neg Hx     Liver Cancer Neg Hx     Rectal Cancer Neg Hx     Stomach Cancer Neg Hx        Social History     Tobacco Use    Smoking status: Former     Packs/day: 0.00     Types: Cigarettes     Quit date: 2001     Years since quittin.5    Smokeless tobacco: Never   Substance Use Topics    Alcohol use: No           REVIEW OF SYSTEMS:  all other systems reviewed and are negative  Review of Systems   Constitutional:  Negative for chills and fever. HENT:  Positive for hearing loss and tinnitus. Negative for congestion, dental problem, ear discharge, ear pain, facial swelling, postnasal drip, rhinorrhea, sinus pressure, sinus pain, sore throat, trouble swallowing and voice change. Eyes:  Negative for photophobia and pain. Neurological:  Positive for dizziness. Negative for seizures, syncope and headaches. Comments:     PHYSICAL EXAM:    /76   Ht 6' 2\" (1.88 m)   Wt 238 lb (108 kg)   BMI 30.56 kg/m²   Body mass index is 30.56 kg/m².     General Appearance: well developed  and well nourished  Head/ Face: normocephalic and atraumatic  Vocal Quality: good/ normal  Ears: Right Ear: External: external ears normal Otoscopy Ear Canal: canal clear Otoscopy TM: TM's normal and TM's mobile Left Ear: External: external ears normal Otoscopy Ear Canal: cerumen Otoscopy TM: TM was hard to visualize due to a very deep ear canal. No obvious abnormalities were appreciated to exam.  Hearing: see audiogram  Nose: nares normal and septum midline  Neck: supple, adenopathy none palpable, and Negative for carotid bruits. Thyroid: normal  The pupils were noted to be equal round reactive to light accommodation with extraocular muscles intact bilaterally. Today he is noted with no lateral nystagmus. Assessment & Plan:    Problem List Items Addressed This Visit       Pulsatile tinnitus of left ear - Primary    Relevant Orders    CT IAC POSTERIOR FOSSA W WO CONTRAST    Vertigo     Persistent vertigo with atypical tinnitus of the left ear  Plan: I have recommended a CT of the left IAC due to his persistent hissing sound to the left ear and the vertigo. I recommended the patient to discontinue his Maxide therapy. I will also place a referral to neurology for further evaluation. I will call the results of the CT once this has been completed.          Relevant [facility], [facility], LES[facility], Neurology, [place]       Orders Placed This Encounter   Procedures    CT IAC POSTERIOR FOSSA W WO CONTRAST     Standing Status:   Future     Standing Expiration Date:   1/10/2024     Scheduling Instructions:      [facility]     Order Specific Question:   STAT Creatinine as needed:     Answer:   Yes     Order Specific Question:   Reason for exam:     Answer:   Pulsatile tinnitus left ear with vertigo    [address] [place] [address] LESA Sethi, Neurology, [place]     Referral Priority:   Routine     Referral Type:   Eval and Treat     Referral Reason:   Specialty Services Required     Referred to Provider:   LESA Mendez Requested Specialty:   Neurology     Number of Visits Requested:   1       No orders of the defined types were placed in this encounter. Electronically signed by Dom Harmon PA-C on 1/10/23 at 1:13 PM CST          Please note that this chart was generated using dragon dictation software. Although every effort was made to ensure the accuracy of this automated transcription, some errors in transcription may have occurred.

## 2023-01-10 NOTE — ASSESSMENT & PLAN NOTE
Persistent vertigo with atypical tinnitus of the left ear  Plan: I have recommended a CT of the left IAC due to his persistent hissing sound to the left ear and the vertigo. I recommended the patient to discontinue his Maxide therapy. I will also place a referral to neurology for further evaluation. I will call the results of the CT once this has been completed.

## 2023-01-11 ENCOUNTER — HOSPITAL ENCOUNTER (OUTPATIENT)
Dept: CT IMAGING | Age: 78
Discharge: HOME | End: 2023-01-11
Payer: MEDICARE

## 2023-01-11 ENCOUNTER — TELEPHONE (OUTPATIENT)
Dept: ENT CLINIC | Age: 78
End: 2023-01-11

## 2023-01-11 DIAGNOSIS — H93.A2 PULSATILE TINNITUS OF LEFT EAR: ICD-10-CM

## 2023-01-11 PROCEDURE — 70482 CT ORBIT/EAR/FOSSA W/O&W/DYE: CPT

## 2023-01-11 PROCEDURE — 70482 CT ORBIT/EAR/FOSSA W/O&W/DYE: CPT | Performed by: RADIOLOGY

## 2023-01-11 PROCEDURE — 6360000004 HC RX CONTRAST MEDICATION: Performed by: PHYSICIAN ASSISTANT

## 2023-01-11 RX ADMIN — IOPAMIDOL 75 ML: 755 INJECTION, SOLUTION INTRAVENOUS at 09:28

## 2023-01-12 NOTE — TELEPHONE ENCOUNTER
I called the patient's cell phone and left a voicemail that his CT of the IAC was unremarkable for acoustic neuroma or any abnormalities. As discussed before, we will have him to see neurology for further work-up of his vertigo. Overall he did not respond to any of the Ménière's therapy or any of the BPPV therapy. Patient was reminded to call if he has any questions or problems.       Electronically signed by Paulette Kee PA-C on 1/11/23 at 6:43 PM CST

## 2023-02-10 ENCOUNTER — OFFICE VISIT (OUTPATIENT)
Dept: NEUROLOGY | Age: 78
End: 2023-02-10
Payer: MEDICARE

## 2023-02-10 VITALS
OXYGEN SATURATION: 99 % | HEART RATE: 78 BPM | DIASTOLIC BLOOD PRESSURE: 89 MMHG | HEIGHT: 74 IN | SYSTOLIC BLOOD PRESSURE: 121 MMHG | WEIGHT: 238 LBS | BODY MASS INDEX: 30.54 KG/M2

## 2023-02-10 DIAGNOSIS — R94.31 ABNORMAL ELECTROCARDIOGRAM (ECG) (EKG): ICD-10-CM

## 2023-02-10 DIAGNOSIS — R42 DIZZINESS: Primary | ICD-10-CM

## 2023-02-10 PROCEDURE — G8427 DOCREV CUR MEDS BY ELIG CLIN: HCPCS | Performed by: NURSE PRACTITIONER

## 2023-02-10 PROCEDURE — 3079F DIAST BP 80-89 MM HG: CPT | Performed by: NURSE PRACTITIONER

## 2023-02-10 PROCEDURE — 1123F ACP DISCUSS/DSCN MKR DOCD: CPT | Performed by: NURSE PRACTITIONER

## 2023-02-10 PROCEDURE — 3074F SYST BP LT 130 MM HG: CPT | Performed by: NURSE PRACTITIONER

## 2023-02-10 PROCEDURE — G8417 CALC BMI ABV UP PARAM F/U: HCPCS | Performed by: NURSE PRACTITIONER

## 2023-02-10 PROCEDURE — G8484 FLU IMMUNIZE NO ADMIN: HCPCS | Performed by: NURSE PRACTITIONER

## 2023-02-10 PROCEDURE — 1036F TOBACCO NON-USER: CPT | Performed by: NURSE PRACTITIONER

## 2023-02-10 PROCEDURE — 99204 OFFICE O/P NEW MOD 45 MIN: CPT | Performed by: NURSE PRACTITIONER

## 2023-02-10 NOTE — PROGRESS NOTES
[facility] Neurology Office Note      Patient:   Tomy Portillo  MR#:    [id number]  Account Number:                         YOB: 1945  Date of Evaluation:  2/10/2023  Time of Note:                          10:21 AM  Primary/Referring Physician:  Tati Aguirre MD   Consulting Physician:  Heidi Leyden DNP, APRN    NEW PATIENT CONSULTATION    Chief Complaint   Patient presents with    New Patient    Dizziness     C/o dizziness and  unable to stand starting in late summer, he states his legs would not work he had to be assisted to car at that times. States he has had several episodes of dizziness with nausea and vomiting      HISTORY OF PRESENT ILLNESS    Tomy Portillo is a 65y.o. year old male here for evaluation of dizziness. Symptoms started last summer. Some mild improvement in symptoms but continues to note intermittent symptoms. Dizziness positional at times. Other times he correlates dizziness with shortness of breath episodes. He wore a pulse ox during one of the episodes and states his oxygen dropped to 50% briefly. He is ataxic, imbalanced with the dizziness. Feels like the room is spinning around him. At times he notes nausea, vomiting. His legs feel weak with episodes. He will feel fatigued and generally weak for 1-2 days following episode. Sleeping does help some. He does have a pacemaker, recently replaced and thinks the shortness of breath and dizziness began after. History of atrial fibrillation as well but no arrhythmias noted on pacemaker interrogation per cardiology note.       Past Medical History:   Diagnosis Date    CAD (coronary artery disease) 12/06/2012    Colon polyp     Diabetes mellitus ([address]     Dizziness     GERD (gastroesophageal reflux disease)     Hyperlipidemia     Hypertension     Near syncope 06/10/2012    Pacemaker 06/11/2012    PAF (paroxysmal atrial fibrillation) ([address] 10/30/2012    Tinnitus        Past Surgical History:   Procedure Laterality Date    APPENDECTOMY CARDIAC CATHETERIZATION  12   JDT    EF  60%  with angioplasty    CHOLECYSTECTOMY, LAPAROSCOPIC  11/2/15    COLONOSCOPY  2015    Nancy: mult polyps, 5 were tubular adenomas    COLONOSCOPY N/A 3/10/2016    Dr Jonas Garcia AP x 3, dysplasia (-), HP x 1, 3 yr recall    COLONOSCOPY N/A 8/15/2019    Dr Sarah Domingo hemorrhoids-Grade 2, diverticulosis, AP-5 yr recall    EYE SURGERY Bilateral     CATARACT    FRACTURE SURGERY      Nose    KIDNEY SURGERY      PACEMAKER PLACEMENT         Family History   Problem Relation Age of Onset    Heart Failure Mother          at age 80    Heart Disease Maternal Uncle     Colon Cancer Neg Hx     Colon Polyps Neg Hx     Esophageal Cancer Neg Hx     Liver Disease Neg Hx     Liver Cancer Neg Hx     Rectal Cancer Neg Hx     Stomach Cancer Neg Hx        Social History     Socioeconomic History    Marital status:      Spouse name: Not on file    Number of children: Not on file    Years of education: Not on file    Highest education level: Not on file   Occupational History    Not on file   Tobacco Use    Smoking status: Former     Packs/day: 0.00     Types: Cigarettes     Quit date: 2001     Years since quittin.6    Smokeless tobacco: Never   Vaping Use    Vaping Use: Never used   Substance and Sexual Activity    Alcohol use: No    Drug use: No    Sexual activity: Not on file   Other Topics Concern    Not on file   Social History Narrative    Not on file     Social Determinants of Health     Financial Resource Strain: Not on file   Food Insecurity: Not on file   Transportation Needs: Not on file   Physical Activity: Not on file   Stress: Not on file   Social Connections: Not on file   Intimate Partner Violence: Not on file   Housing Stability: Not on file       Current Outpatient Medications   Medication Sig Dispense Refill    propafenone (RYTHMOL) 150 MG tablet TAKE 1 TABLET BY MOUTH  EVERY 8 HOURS 270 tablet 3 triamterene-hydroCHLOROthiazide (MAXZIDE-25) 37.5-25 MG per tablet Take 1 tablet by mouth daily 30 tablet 2    amLODIPine (NORVASC) 5 MG tablet TAKE 1 TABLET BY MOUTH  DAILY 90 tablet 3    metoprolol tartrate (LOPRESSOR) 25 MG tablet TAKE ONE-HALF TABLET BY  MOUTH TWICE DAILY 90 tablet 3    meclizine (ANTIVERT) 25 MG tablet Take 1 tablet by mouth 3 times daily as needed for Dizziness 30 tablet 2    aspirin EC 81 MG EC tablet Take 1 tablet by mouth daily 90 tablet 1    famotidine (PEPCID) 20 MG tablet Take 20 mg by mouth every evening      losartan (COZAAR) 100 MG tablet Take 0.5 tablet twice daily      metFORMIN (GLUCOPHAGE) 500 MG tablet Take 500 mg by mouth 2 times daily (with meals)       No current facility-administered medications for this visit.        Allergies   Allergen Reactions    Lisinopril      cough     REVIEW OF SYSTEMS  Constitutional: []Fever []Sweats []Chills [] Recent Injury [x] Denies all unless marked  HEENT:[]Headache  [] Head Injury [] Hearing Loss  [] Sore Throat  [] Ear Ache [x] Denies all unless marked  Spine:  [] Neck pain  [] Back pain  [] Sciaticia  [x] Denies all unless marked  Cardiovascular:[]Heart Disease []Palpitations [] Chest Pain   [x] Denies all unless marked  Pulmonary: []Shortness of Breath []Cough   [x] Denies all unless marked  Psychiatric/Behavioral:[] Depression [] Anxiety [x] Denies all unless marked  Gastrointestinal: [x]Nausea  [x]Vomiting  []Abdominal Pain  []Constipation  []Diarrhea  [x] Denies all unless marked  Genitourinary:   [] Frequency  [] Urgency  [] Dysuria [] Incontinence  [x] Denies all unless marked  Extremities: []Pain  []Swelling  [x] Denies all unless marked  Musculoskeletal: [] Myalgias  [] Joint Pain  [] Arthritis [] Muscle Cramps [] Muscle Twitches  [x] Denies all unless marked  Sleep: []Insomnia[]Snoring []Restless Legs  []Sleep Apnea  []Daytime Sleepiness  [x] Denies all unless marked  Skin:[] Rash [] Color Change [x] Denies all unless marked Neurological:[]Visual Disturbance [] Memory Loss []Loss of Balance []Slurred Speech []Weakness []Seizures  [x] Dizziness [x] Denies all unless marked    The MA has completed the ROS with the patient. I have reviewed it in its' entirety with the patient and agree with the documentation. PHYSICAL EXAM  /89   Pulse 78   Ht 6' 2\" (1.88 m)   Wt 238 lb (108 kg)   SpO2 99%   BMI 30.56 kg/m²       Constitutional - No acute distress    HEENT- Conjunctiva normal.  No scars, masses, or lesions over external nose or ears, no neck masses noted, no jugular vein distension, no bruit  Cardiac- Regular rate and rhythm  Pulmonary- Good expansion, normal effort without use of accessory muscles  Musculoskeletal - No significant wasting of muscles noted, no bony deformities  Extremities - No clubbing, cyanosis or edema  Skin - Warm, dry, and intact. No rash, erythema, or pallor  Psychiatric - Mood, affect, and behavior appear normal      NEUROLOGICAL EXAM     Mental status   [x] Awake, alert, oriented   [x]Affect attention and concentration appear appropriate  [x]Recent and remote memory appears unremarkable  [x]Speech normal without dysarthria or aphasia, comprehension and repetition intact.    COMMENTS:    Cranial Nerves [x]No VF deficit to confrontation,  no papilledema on fundoscopic exam.  [x]PERRLA, EOMI, no nystagmus, conjugate eye movements, no ptosis  [x]Face symmetric  [x]Facial sensation intact  [x]Tongue midline no atrophy or fasciculations present  [x]Palate midline, hearing to finger rub normal bilaterally  [x]Shoulder shrug and SCM testing normal bilaterally  COMMENTS:   Motor   [x]5/5 strength x 4 extremities  [x]Normal bulk and tone  [x]No tremor present  [x]No rigidity or bradykinesia noted  COMMENTS:   Sensory  [x]Sensation intact to light touch, pin prick, vibration, and proprioception BLE  [x]Sensation intact to light touch, pin prick, vibration, and proprioception BUE  COMMENTS:   Coordination [x]FTN normal bilaterally   [x]HTS normal bilaterally  [x]FERNANDO normal bilaterally. COMMENTS:   Reflexes  [x]Symmetric and non-pathological  [x]Toes down going bilaterally  [x]No clonus present  COMMENTS:   Gait                  [x]Normal steady gait    []Ataxic    []Spastic     []Magnetic     []Shuffling  COMMENTS:       LABS RECORD AND IMAGING REVIEW (As below and per HPI)    No results found for: [id number]  Lab Results   Component Value Date    WBC 6.0 08/12/2022    HGB 13.9 (L) 08/12/2022    HCT 45.6 08/12/2022    MCV 90.7 08/12/2022     08/12/2022     Lab Results   Component Value Date     08/12/2022    K 4.5 08/12/2022     08/12/2022    CO2 24 08/12/2022    BUN 22 08/12/2022    CREATININE 1.2 08/12/2022    GLUCOSE 186 (H) 08/12/2022    CALCIUM 9.4 08/12/2022    PROT 7.2 08/12/2022    LABALBU 4.5 08/12/2022    BILITOT 0.5 08/12/2022    ALKPHOS 45 08/12/2022    AST 21 08/12/2022    ALT 24 08/12/2022    LABGLOM 59 (A) 08/12/2022    GFRAA >59 08/12/2022     No results found for: CHOL, TRIG, HDL, LDLCALC  Lab Results   Component Value Date    TSH 1.070 06/08/2012     No results found for: CRP, SEDRATE     CT IAC POSTERIOR FOSSA W WO CONTRAST    Result Date: 1/11/2023  NO PRIOR REPORT AVAILABLE Exam: CT OF THE TEMPORAL BONES WITHOUT AND WITH CONTRAST Clinical data: Pulsatile tinnitus in left ear. Vertigo. Technique: Contiguous axial and coronal imaging of the temporal bones without and with contrast.  Radiation dose: CTDIvol =95.26 mGy, DLP =937.63 mGy x cm. Prior studies: No prior studies submitted. Findings: The mastoid air cells are well pneumatized. The calvarium is intact. The mandibular condyles are anatomically located. The greater wings of the sphenoid and the zygomatic arches are intact. The pterygoid plates are preserved. Maxillary, frontal, ethmoid and sphenoid sinuses are clear. The globes and the orbits are intact. The extraocular muscles are symmetric.  The middle ear cavities appear clear. The external auditory canal is intact. The middle ear ossicles are intact. The inner ear structures are intact. Internal auditory canal is clear. The course of the 7th nerve appears normal.  The internal carotid artery and the jugular bulb canals are anatomically located. The soft tissues are unremarkable. Normal CT scan of the temporal bones. All CT scans at this facility utilize dose modulation, iterative reconstruction, and/or weight based dosing when appropriate to reduce radiation dose to as low as reasonably achievable. Dictated and Electronically Signed by Shanna Khan MD at 11-Jan-2023 03:22:58 PM             Carotid artery u/s (9/2022)- mixed plaque noted in bilateral internal carotid arteries; less than 50% stenosis in bilateral internal carotid arteries. Normal antegrade flow in bilateral vertebral arteries. Reviewed referral records, reviewed cardiology notes     ASSESSMENT:    Debbie Henderson is a 65y.o. year old male here for evaluation of dizziness. Exam today is non focal. Description of dizziness is somewhat atypical. Suspect dizziness to be multifactorial in nature with vertigo, cardiac components. Will plan for MRI brain and echocardiogram. May need to consider MRA head or EEG pending work up. Continue Meclizine prn. ICD-10-CM    1. Dizziness  R42 MRI BRAIN W WO CONTRAST     Echo 2d w doppler w color w contrast      2. Abnormal electrocardiogram (ECG) (EKG)   R94.31 Echo 2d w doppler w color w contrast        PLAN:  MRI brain   Echocardiogram   Continue Meclizine prn   Fall precautions   5. Return in about 2 months (around 4/10/2023) for follow up, sooner if worsening.     Breanna Mcmahon DNP, APRN

## 2023-02-10 NOTE — PROGRESS NOTES

## 2023-02-16 DIAGNOSIS — I49.5 SICK SINUS SYNDROME (HCC): ICD-10-CM

## 2023-02-16 DIAGNOSIS — Z95.0 PACEMAKER: Primary | ICD-10-CM

## 2023-02-16 PROCEDURE — 93296 REM INTERROG EVL PM/IDS: CPT | Performed by: NURSE PRACTITIONER

## 2023-02-16 PROCEDURE — 93294 REM INTERROG EVL PM/LDLS PM: CPT | Performed by: NURSE PRACTITIONER

## 2023-02-28 ENCOUNTER — TELEPHONE (OUTPATIENT)
Dept: NEUROLOGY | Age: 78
End: 2023-02-28

## 2023-02-28 ENCOUNTER — HOSPITAL ENCOUNTER (OUTPATIENT)
Dept: NON INVASIVE DIAGNOSTICS | Age: 78
Discharge: HOME | End: 2023-02-28
Payer: MEDICARE

## 2023-02-28 ENCOUNTER — HOSPITAL ENCOUNTER (OUTPATIENT)
Dept: MRI IMAGING | Age: 78
Discharge: HOME | End: 2023-02-28
Payer: MEDICARE

## 2023-02-28 VITALS — BODY MASS INDEX: 29.27 KG/M2 | WEIGHT: 228 LBS

## 2023-02-28 DIAGNOSIS — R94.31 ABNORMAL ELECTROCARDIOGRAM (ECG) (EKG): ICD-10-CM

## 2023-02-28 DIAGNOSIS — R42 DIZZINESS: ICD-10-CM

## 2023-02-28 DIAGNOSIS — I25.10 ATHEROSCLEROSIS OF NATIVE CORONARY ARTERY OF NATIVE HEART WITHOUT ANGINA PECTORIS: ICD-10-CM

## 2023-02-28 LAB
GFR SERPL CREATININE-BSD FRML MDRD: 22 ML/MIN/{1.73_M2}
LV EF: 58 %
LVEF MODALITY: NORMAL
PERFORMED ON: ABNORMAL
POC CREATININE: 2.8 MG/DL (ref 0.3–1.3)
POC SAMPLE TYPE: ABNORMAL

## 2023-02-28 PROCEDURE — 6360000004 HC RX CONTRAST MEDICATION: Performed by: INTERNAL MEDICINE

## 2023-02-28 PROCEDURE — 6360000002 HC RX W HCPCS: Performed by: FAMILY MEDICINE

## 2023-02-28 PROCEDURE — 70551 MRI BRAIN STEM W/O DYE: CPT

## 2023-02-28 PROCEDURE — C8928 TTE W OR W/O FOL W/CON,STRES: HCPCS

## 2023-02-28 PROCEDURE — 70551 MRI BRAIN STEM W/O DYE: CPT | Performed by: RADIOLOGY

## 2023-02-28 PROCEDURE — 82565 ASSAY OF CREATININE: CPT

## 2023-02-28 PROCEDURE — 2580000003 HC RX 258: Performed by: FAMILY MEDICINE

## 2023-02-28 PROCEDURE — C8929 TTE W OR WO FOL WCON,DOPPLER: HCPCS

## 2023-02-28 RX ORDER — ATROPINE SULFATE 0.1 MG/ML
1 INJECTION INTRAVENOUS PRN
Status: DISCONTINUED | OUTPATIENT
Start: 2023-02-28 | End: 2023-03-01 | Stop reason: HOSPADM

## 2023-02-28 RX ORDER — DOBUTAMINE HYDROCHLORIDE 200 MG/100ML
2.5-1 INJECTION INTRAVENOUS CONTINUOUS PRN
Status: DISCONTINUED | OUTPATIENT
Start: 2023-02-28 | End: 2023-03-01 | Stop reason: HOSPADM

## 2023-02-28 RX ORDER — METOPROLOL TARTRATE 5 MG/5ML
5 INJECTION INTRAVENOUS PRN
Status: DISCONTINUED | OUTPATIENT
Start: 2023-02-28 | End: 2023-03-01 | Stop reason: HOSPADM

## 2023-02-28 RX ORDER — SODIUM CHLORIDE 9 MG/ML
INJECTION, SOLUTION INTRAVENOUS
Status: COMPLETED | OUTPATIENT
Start: 2023-02-28 | End: 2023-02-28

## 2023-02-28 RX ADMIN — SODIUM CHLORIDE: 9 INJECTION, SOLUTION INTRAVENOUS at 09:43

## 2023-02-28 RX ADMIN — DOBUTAMINE HYDROCHLORIDE 10 MCG/KG/MIN: 200 INJECTION INTRAVENOUS at 09:44

## 2023-02-28 RX ADMIN — PERFLUTREN 1.5 ML: 6.52 INJECTION, SUSPENSION INTRAVENOUS at 09:21

## 2023-02-28 NOTE — TELEPHONE ENCOUNTER
----- Message from LESA Camacho sent at 2/28/2023  8:12 AM CST -----  His kidney function has worsened quite a bit from the last labs I can see (8/2022). Have him follow up with primary regarding this, can you fax labs to primary? Called pt no answer- Labs faxed to PCP    [id number] pt wife Bahman Gonzalez called back, explained results in detail. Pt wife voiced she would tell pt and call pcp to get an appointment.

## 2023-03-13 LAB
ALBUMIN SERPL-MCNC: 4.2 G/DL (ref 3.5–5.2)
ALP BLD-CCNC: 33 U/L (ref 40–130)
ALT SERPL-CCNC: 18 U/L (ref 5–41)
ANION GAP SERPL CALCULATED.3IONS-SCNC: 12 MMOL/L (ref 7–19)
AST SERPL-CCNC: 16 U/L (ref 5–40)
BACTERIA: NEGATIVE /HPF
BASOPHILS ABSOLUTE: 0.1 K/UL (ref 0–0.2)
BASOPHILS RELATIVE PERCENT: 0.6 % (ref 0–1)
BILIRUB SERPL-MCNC: <0.2 MG/DL (ref 0.2–1.2)
BILIRUBIN URINE: NEGATIVE
BLOOD, URINE: NEGATIVE
BUN BLDV-MCNC: 29 MG/DL (ref 8–23)
CALCIUM SERPL-MCNC: 9.4 MG/DL (ref 8.8–10.2)
CHLORIDE BLD-SCNC: 105 MMOL/L (ref 98–111)
CLARITY: CLEAR
CO2: 24 MMOL/L (ref 22–29)
COLOR: YELLOW
CREAT SERPL-MCNC: 1.8 MG/DL (ref 0.5–1.2)
CREATININE URINE: 174.1 MG/DL (ref 4.2–622)
CRYSTALS, UA: ABNORMAL /HPF
EOSINOPHILS ABSOLUTE: 0.2 K/UL (ref 0–0.6)
EOSINOPHILS RELATIVE PERCENT: 2.6 % (ref 0–5)
EPITHELIAL CELLS, UA: 1 /HPF (ref 0–5)
GFR SERPL CREATININE-BSD FRML MDRD: 38 ML/MIN/{1.73_M2}
GLUCOSE BLD-MCNC: 126 MG/DL (ref 74–109)
GLUCOSE URINE: NEGATIVE MG/DL
HCT VFR BLD CALC: 41.9 % (ref 42–52)
HEMOGLOBIN: 13.1 G/DL (ref 14–18)
HYALINE CASTS: 4 /HPF (ref 0–8)
IMMATURE GRANULOCYTES #: 0 K/UL
KETONES, URINE: NEGATIVE MG/DL
LEUKOCYTE ESTERASE, URINE: ABNORMAL
LYMPHOCYTES ABSOLUTE: 2 K/UL (ref 1.1–4.5)
LYMPHOCYTES RELATIVE PERCENT: 25 % (ref 20–40)
MAGNESIUM: 2.2 MG/DL (ref 1.6–2.4)
MCH RBC QN AUTO: 29.4 PG (ref 27–31)
MCHC RBC AUTO-ENTMCNC: 31.3 G/DL (ref 33–37)
MCV RBC AUTO: 94.2 FL (ref 80–94)
MONOCYTES ABSOLUTE: 0.4 K/UL (ref 0–0.9)
MONOCYTES RELATIVE PERCENT: 5.4 % (ref 0–10)
NEUTROPHILS ABSOLUTE: 5.4 K/UL (ref 1.5–7.5)
NEUTROPHILS RELATIVE PERCENT: 66 % (ref 50–65)
NITRITE, URINE: NEGATIVE
PARATHYROID HORMONE INTACT: 82.1 PG/ML (ref 15–65)
PDW BLD-RTO: 14.5 % (ref 11.5–14.5)
PH UA: 5 (ref 5–8)
PHOSPHORUS: 2.9 MG/DL (ref 2.5–4.5)
PLATELET # BLD: 205 K/UL (ref 130–400)
PMV BLD AUTO: 11.3 FL (ref 9.4–12.4)
POTASSIUM SERPL-SCNC: 4.4 MMOL/L (ref 3.5–5)
PROTEIN PROTEIN: 17 MG/DL (ref 15–45)
PROTEIN UA: ABNORMAL MG/DL
RBC # BLD: 4.45 M/UL (ref 4.7–6.1)
RBC UA: 6 /HPF (ref 0–4)
SODIUM BLD-SCNC: 141 MMOL/L (ref 136–145)
SPECIFIC GRAVITY UA: 1.02 (ref 1–1.03)
TOTAL PROTEIN: 7.4 G/DL (ref 6.6–8.7)
URIC ACID, SERUM: 6.3 MG/DL (ref 3.4–7)
UROBILINOGEN, URINE: 0.2 E.U./DL
VITAMIN D 25-HYDROXY: 22.5 NG/ML
WBC # BLD: 8.2 K/UL (ref 4.8–10.8)
WBC UA: 3 /HPF (ref 0–5)

## 2023-03-15 LAB
C3 SERPL-MCNC: 149 MG/DL (ref 90–180)
C4 SERPL-MCNC: 29 MG/DL (ref 10–40)
DEPRECATED KAPPA LC FREE/LAMBDA SER: 2.02 {RATIO} (ref 0.26–1.65)
KAPPA LC FREE SER-MCNC: 59.17 MG/L (ref 3.3–19.4)
LAMBDA LC FREE SERPL-MCNC: 29.32 MG/L (ref 5.71–26.3)
NUCLEAR IGG SER QL IA: NORMAL

## 2023-03-16 LAB
1,25(OH)2D3 SERPL-MCNC: 24.5 PG/ML (ref 19.9–79.3)
MYELOPEROXIDASE AB SER-ACNC: 0 AU/ML (ref 0–19)
PROTEINASE3 AB SER-ACNC: 0 AU/ML (ref 0–19)

## 2023-03-17 LAB
ALBUMIN SERPL-MCNC: 4.19 G/DL (ref 3.75–5.01)
ALPHA1 GLOB SERPL ELPH-MCNC: 0.29 G/DL (ref 0.19–0.46)
ALPHA2 GLOB SERPL ELPH-MCNC: 0.87 G/DL (ref 0.48–1.05)
B-GLOBULIN SERPL ELPH-MCNC: 0.84 G/DL (ref 0.48–1.1)
GAMMA GLOB SERPL ELPH-MCNC: 0.92 G/DL (ref 0.62–1.51)
INTERPRETATION SERPL IFE-IMP: NORMAL
MONOCLON BAND OBS SERPL: NORMAL
PROT SERPL-MCNC: 7.1 G/DL (ref 6.3–8.2)

## 2023-03-28 DIAGNOSIS — Z95.0 PACEMAKER: ICD-10-CM

## 2023-03-28 DIAGNOSIS — I48.0 PAF (PAROXYSMAL ATRIAL FIBRILLATION) (HCC): ICD-10-CM

## 2023-03-28 RX ORDER — PROPAFENONE HYDROCHLORIDE 150 MG/1
TABLET, COATED ORAL
Qty: 270 TABLET | Refills: 3 | Status: SHIPPED | OUTPATIENT
Start: 2023-03-28

## 2023-05-16 ENCOUNTER — OFFICE VISIT (OUTPATIENT)
Dept: CARDIOLOGY CLINIC | Age: 78
End: 2023-05-16
Payer: MEDICARE

## 2023-05-16 VITALS
HEART RATE: 78 BPM | WEIGHT: 238 LBS | BODY MASS INDEX: 30.54 KG/M2 | SYSTOLIC BLOOD PRESSURE: 124 MMHG | DIASTOLIC BLOOD PRESSURE: 68 MMHG | HEIGHT: 74 IN

## 2023-05-16 DIAGNOSIS — I48.0 PAF (PAROXYSMAL ATRIAL FIBRILLATION) (HCC): Primary | ICD-10-CM

## 2023-05-16 DIAGNOSIS — I10 ESSENTIAL HYPERTENSION: ICD-10-CM

## 2023-05-16 DIAGNOSIS — I25.10 CORONARY ARTERY DISEASE INVOLVING NATIVE CORONARY ARTERY OF NATIVE HEART WITHOUT ANGINA PECTORIS: ICD-10-CM

## 2023-05-16 DIAGNOSIS — Z95.0 PACEMAKER: ICD-10-CM

## 2023-05-16 DIAGNOSIS — I49.5 SICK SINUS SYNDROME (HCC): ICD-10-CM

## 2023-05-16 PROCEDURE — G8427 DOCREV CUR MEDS BY ELIG CLIN: HCPCS | Performed by: CLINICAL NURSE SPECIALIST

## 2023-05-16 PROCEDURE — 3074F SYST BP LT 130 MM HG: CPT | Performed by: CLINICAL NURSE SPECIALIST

## 2023-05-16 PROCEDURE — 3078F DIAST BP <80 MM HG: CPT | Performed by: CLINICAL NURSE SPECIALIST

## 2023-05-16 PROCEDURE — 1036F TOBACCO NON-USER: CPT | Performed by: CLINICAL NURSE SPECIALIST

## 2023-05-16 PROCEDURE — 93288 INTERROG EVL PM/LDLS PM IP: CPT | Performed by: CLINICAL NURSE SPECIALIST

## 2023-05-16 PROCEDURE — 99214 OFFICE O/P EST MOD 30 MIN: CPT | Performed by: CLINICAL NURSE SPECIALIST

## 2023-05-16 PROCEDURE — 1123F ACP DISCUSS/DSCN MKR DOCD: CPT | Performed by: CLINICAL NURSE SPECIALIST

## 2023-05-16 PROCEDURE — G8417 CALC BMI ABV UP PARAM F/U: HCPCS | Performed by: CLINICAL NURSE SPECIALIST

## 2023-05-16 NOTE — PROGRESS NOTES
[facility]  [address]  [id number]  Phone: [phone]  Fax: [phone]    OFFICE VISIT:  2023    Vania Tsai - : 1945    Reason For Visit:  Yuan Cordova is a 65 y.o. male who is here for 6 Month Follow-Up (No symptoms), Irregular Heart Beat (Pacemaker), and Coronary Artery Disease  History of nonocclusive coronary disease with last heart catheterization in  EF 45%. Pacemaker placed in  for sick sinus syndrome with paroxysmal atrial fibrillation controlled on Rythmol. Had generator replacement 3/25/2021  Patient is had some ongoing dizziness. In February had dobutamine stress echo that showed no evidence of myocardial ischemia. 2D echo showed normal LV size and function with EF 55 to 60%. Mild diastolic dysfunction. No significant valvular disease    He returns today in follow-up. He states his been feeling better. It was found that the metformin was causing a lot of his symptoms. He is now off of that for the last 8 weeks and feeling much better    Subjective  Glendal denies exertional chest pain, shortness of breath, orthopnea, paroxysmal nocturnal dyspnea, syncope, presyncope, arrhythmia, edema and fatigue. The patient denies numbness or weakness to suggest cerebrovascular accident or transient ischemic attack. Raven Garces MD is PCP and follows labs including lipids.   Vania Tsai has the following history as recorded in [facility]:    Patient Active Problem List    Diagnosis Date Noted    Pulsatile tinnitus of left ear 01/10/2023    Bilateral impacted cerumen 10/31/2022    Alternating constipation and diarrhea 2021    Renal mass, right 2020    Vertigo 2018    RITTER (dyspnea on exertion) 2018    History of adenomatous polyp of colon 03/10/2016    Heme positive stool 2014    Rectal bleeding 2014    CAD (coronary artery disease) 2012    PAF (paroxysmal atrial fibrillation) [address] [address] 10/30/2012    Hypertension

## 2023-05-16 NOTE — PATIENT INSTRUCTIONS
Pacemaker will send in 3 mos   Maintain good blood pressure control-goal<130/80 at rest  Maintain good cholesterol control LDL goal<70 with arterial disease  If you are diabetic work to keep/obtain hemoglobin A1c< 7    Follow up in 6 mos With pacemaker check   Call with any questions or concerns  Follow up with Tellis Cushing, MD for non cardiac problems  Report any new problems  Cardiovascular Fitness-Exercise as tolerated. Strive for 30 minutes of exercise most days of the week. Cardiac / Healthy Diet- Avoid processed high fat foods, maintain low sodium/salt   Continue current medications as directed  Continue plan of treatment  It is always recommended that you bring your medications bottles with you to each visit - this is for your safety!

## 2023-08-16 ENCOUNTER — TELEPHONE (OUTPATIENT)
Dept: CARDIOLOGY CLINIC | Age: 78
End: 2023-08-16

## 2023-08-16 DIAGNOSIS — Z95.0 PACEMAKER: Primary | ICD-10-CM

## 2023-08-16 DIAGNOSIS — I49.5 SICK SINUS SYNDROME (HCC): ICD-10-CM

## 2023-10-22 DIAGNOSIS — I10 ESSENTIAL HYPERTENSION: ICD-10-CM

## 2023-10-23 RX ORDER — AMLODIPINE BESYLATE 5 MG/1
5 TABLET ORAL DAILY
Qty: 90 TABLET | Refills: 3 | Status: SHIPPED | OUTPATIENT
Start: 2023-10-23

## 2023-11-08 NOTE — PROGRESS NOTES
[facility] - PODIATRY    Today's Date: 11/10/2023     Patient Name: Jero Higgins  MRN: [id number]  CSN: [id number]  PCP: Colten Otto MD  Referring Provider: Colten Otto MD    SUBJECTIVE     Chief Complaint   Patient presents with    Establish Care     Colten Otto MD 2023 NEW PATIENT - Onychogryphosis- pt states he is diabetic and nails are  thick and needs help trimming them- pt denies pain     Diabetes     120mg/dl BG yesterday am     HPI: Jero Higgins, a 78 y.o.male, comes to clinic as a(n) new patient presenting for diabetic foot exam and complaining of toenail/callus issues. Patient has h/o cancer, CHF, DM2, HTN . Patient is NIDDM with last stated BG level of 120mg/dl. Relates only mild numbness in feet. Denies open wounds or sores. Notes that his toenails are long, thick and crumbly. He has trouble caring for his toenails himself. Denies pain. Denies previous treatment. Denies any constitutional symptoms. No other pedal complaints at this time.    Past Medical History:   Diagnosis Date    Cancer     Congestive cardiac failure     Diabetes mellitus     Hypertension      Past Surgical History:   Procedure Laterality Date    APPENDECTOMY      PACEMAKER IMPLANTATION       History reviewed. No pertinent family history.  Social History     Socioeconomic History    Marital status:    Tobacco Use    Smoking status: Former     Types: Cigarettes     Quit date: 2000     Years since quittin.8    Smokeless tobacco: Never   Vaping Use    Vaping Use: Never used   Substance and Sexual Activity    Alcohol use: Yes     Comment: occ    Drug use: Never    Sexual activity: Defer     Allergies   Allergen Reactions    Lisinopril Cough     Current Outpatient Medications   Medication Sig Dispense Refill    amLODIPine (NORVASC) 5 MG tablet Take 1 tablet by mouth Daily.      aspirin 81 MG EC tablet Take 1 tablet by mouth Daily.      famotidine (PEPCID) 20 MG tablet Take 1  tablet by mouth 2 (Two) Times a Day.      losartan (COZAAR) 50 MG tablet Take 2 tablets by mouth Daily.      metFORMIN (GLUCOPHAGE) 500 MG tablet Take 1 tablet by mouth 2 (Two) Times a Day With Meals.      metoprolol tartrate (LOPRESSOR) 25 MG tablet Take 1 tablet by mouth 2 (Two) Times a Day.      propafenone (RYTHMOL) 150 MG tablet Take 1 tablet by mouth Every 8 (Eight) Hours.      triamterene-hydrochlorothiazide (DYAZIDE) 37.5-25 MG per capsule Take 1 capsule by mouth Every Morning.       No current facility-administered medications for this visit.     Review of Systems   Constitutional:  Negative for chills and fever.   HENT:  Negative for congestion.    Respiratory:  Negative for shortness of breath.    Cardiovascular:  Negative for chest pain and leg swelling.   Gastrointestinal:  Negative for constipation, diarrhea, nausea and vomiting.   Musculoskeletal:  Positive for arthralgias.        Foot pain   Skin:  Negative for wound.   Neurological:  Negative for numbness.   Psychiatric/Behavioral:  Negative for agitation.        OBJECTIVE     Vitals:    11/10/23 1029   BP: 124/62   Pulse: 77   SpO2: 97%       PHYSICAL EXAM  GEN:   Accompanied by none.     Foot/Ankle Exam    GENERAL  Diabetic foot exam performed    Appearance:  appears stated age  Orientation:  AAOx3  Affect:  appropriate  Gait:  unimpaired  Assistance:  independent  Right shoe gear: casual shoe  Left shoe gear: casual shoe    VASCULAR     Right Foot Vascularity   Dorsalis pedis:  2+  Posterior tibial:  2+  Skin temperature:  warm  Edema grading:  None  CFT:  3  Pedal hair growth:  Present  Varicosities:  none     Left Foot Vascularity   Dorsalis pedis:  2+  Posterior tibial:  2+  Skin temperature:  warm  Edema grading:  None  CFT:  3  Pedal hair growth:  Present  Varicosities:  none     NEUROLOGIC     Right Foot Neurologic   Light touch sensation: diminished  Vibratory sensation: diminished  Hot/Cold sensation: diminished  Protective Sensation  using [place]an Monofilament:   Sites intact: 9  Sites tested: 10     Left Foot Neurologic   Light touch sensation: diminished  Vibratory sensation: diminished  Hot/Cold sensation:  diminished  Protective Sensation using [place]yaan Monofilament:   Sites intact: 10  Sites tested: 10    MUSCULOSKELETAL     Right Foot Musculoskeletal   Ecchymosis:  none  Tenderness:  toenail problem    Arch:  Normal  Claw toe:  Second toe, third toe, fourth toe and fifth toe     Left Foot Musculoskeletal   Ecchymosis:  none  Tenderness:  toenail problem  Arch:  Normal  Claw toe:  Second toe, third toe, fourth toe and fifth toe    MUSCLE STRENGTH     Right Foot Muscle Strength   Foot dorsiflexion:  5  Foot plantar flexion:  5  Foot inversion:  5  Foot eversion:  5     Left Foot Muscle Strength   Foot dorsiflexion:  5  Foot plantar flexion:  5  Foot inversion:  5  Foot eversion:  5    RANGE OF MOTION     Right Foot Range of Motion   Foot and ankle ROM within normal limits       Left Foot Range of Motion   Foot and ankle ROM within normal limits      DERMATOLOGIC      Right Foot Dermatologic   Skin  Right foot skin is intact.   Nails  1.  Positive for elongated, onychomycosis, abnormal thickness, subungual debris and dystrophic nail.  2.  Positive for elongated, onychomycosis, abnormal thickness and subungual debris.  3.  Positive for elongated, onychomycosis, abnormal thickness and subungual debris.  4.  Positive for elongated, onychomycosis, abnormal thickness and subungual debris.  5.  Positive for elongated, onychomycosis, abnormal thickness and subungual debris.     Left Foot Dermatologic   Skin  Left foot skin is intact.   Nails  1.  Positive for elongated, onychomycosis, abnormal thickness, subungual debris and dystrophic nail.  2.  Positive for elongated, onychomycosis, abnormal thickness and subungual debris.  3.  Positive for elongated, onychomycosis, abnormal thickness and subungual debris.  4.  Positive for  elongated, onychomycosis, abnormally thick and subungual debris.  5.  Positive for elongated, onychomycosis, abnormally thick and subungual debris.      RADIOLOGY/NUCLEAR:  No results found.    LABORATORY/CULTURE RESULTS:      PATHOLOGY RESULTS:       ASSESSMENT/PLAN     Diagnoses and all orders for this visit:    1. Onychomycosis (Primary)    2. Stage 3b chronic kidney disease    3. Type 2 diabetes mellitus with diabetic neuropathy, without long-term current use of insulin    4. Encounter for diabetic foot exam    5. Claw toe      Comprehensive lower extremity examination and evaluation was performed.  Discussed findings and treatment plan including risks, benefits, and treatment options with patient in detail. Patient agreed with treatment plan.  After verbal consent obtained, nail(s) x10 debrided of length and thickness with nail nipper without incidence  Patient may maintain nails and calluses at home utilizing emery board or pumice stone between visits as needed  Reviewed at home diabetic foot care including daily foot checks   Continue BG control with assistance of PCP.  An After Visit Summary was printed and given to the patient at discharge, including (if requested) any available informative/educational handouts regarding diagnosis, treatment, or medications. All questions were answered to patient/family satisfaction. Should symptoms fail to improve or worsen they agree to call or return to clinic or to go to the Emergency Department. Discussed the importance of following up with any needed screening tests/labs/specialist appointments and any requested follow-up recommended by me today. Importance of maintaining follow-up discussed and patient accepts that missed appointments can delay diagnosis and potentially lead to worsening of conditions.  Return in about 3 months (around 2/10/2024) for Follow-up with Podiatry APRN, Schedule [facility]., or sooner if acute issues arise.        This document has  been electronically signed by Cody Hawthorne DPM on November 10, 2023 10:53 CST

## 2023-11-09 ENCOUNTER — TELEPHONE (OUTPATIENT)
Dept: PODIATRY | Facility: CLINIC | Age: 78
End: 2023-11-09
Payer: MEDICARE

## 2023-11-09 NOTE — TELEPHONE ENCOUNTER
Called patient regarding appt on 11/10/2023. Left message for patient to return call if any questions or concerns arise.

## 2023-11-10 ENCOUNTER — OFFICE VISIT (OUTPATIENT)
Dept: PODIATRY | Facility: CLINIC | Age: 78
End: 2023-11-10
Payer: MEDICARE

## 2023-11-10 VITALS
WEIGHT: 230 LBS | BODY MASS INDEX: 29.52 KG/M2 | SYSTOLIC BLOOD PRESSURE: 124 MMHG | DIASTOLIC BLOOD PRESSURE: 62 MMHG | HEART RATE: 77 BPM | HEIGHT: 74 IN | OXYGEN SATURATION: 97 %

## 2023-11-10 DIAGNOSIS — B35.1 ONYCHOMYCOSIS: Primary | ICD-10-CM

## 2023-11-10 DIAGNOSIS — E11.9 ENCOUNTER FOR DIABETIC FOOT EXAM: ICD-10-CM

## 2023-11-10 DIAGNOSIS — Q66.89 CLAW TOE: ICD-10-CM

## 2023-11-10 DIAGNOSIS — N18.32 STAGE 3B CHRONIC KIDNEY DISEASE: ICD-10-CM

## 2023-11-10 DIAGNOSIS — E11.40 TYPE 2 DIABETES MELLITUS WITH DIABETIC NEUROPATHY, WITHOUT LONG-TERM CURRENT USE OF INSULIN: ICD-10-CM

## 2023-11-10 PROBLEM — R42 VERTIGO: Status: ACTIVE | Noted: 2018-07-14

## 2023-11-10 PROBLEM — E11.65 HYPERGLYCEMIA DUE TO TYPE 2 DIABETES MELLITUS: Status: ACTIVE | Noted: 2023-07-19

## 2023-11-10 PROBLEM — H61.23 BILATERAL IMPACTED CERUMEN: Status: ACTIVE | Noted: 2022-10-31

## 2023-11-10 PROBLEM — I49.9 CARDIAC ARRHYTHMIA: Status: ACTIVE | Noted: 2019-04-17

## 2023-11-10 PROBLEM — K80.20 CHOLELITHIASIS WITHOUT OBSTRUCTION: Status: ACTIVE | Noted: 2019-04-17

## 2023-11-10 PROBLEM — H81.03 MENIERE'S DISEASE OF BOTH EARS: Status: ACTIVE | Noted: 2022-11-03

## 2023-11-10 PROBLEM — C64.9 RENAL CELL CARCINOMA: Status: ACTIVE | Noted: 2020-10-12

## 2023-11-10 PROBLEM — R19.8 ALTERNATING CONSTIPATION AND DIARRHEA: Status: ACTIVE | Noted: 2021-06-25

## 2023-11-10 PROBLEM — I10 BENIGN ESSENTIAL HYPERTENSION: Status: ACTIVE | Noted: 2019-04-17

## 2023-11-10 PROBLEM — K21.9 GASTROESOPHAGEAL REFLUX DISEASE WITHOUT ESOPHAGITIS: Status: ACTIVE | Noted: 2019-04-17

## 2023-11-10 PROBLEM — Z85.528 PERSONAL HISTORY OF KIDNEY CANCER: Status: ACTIVE | Noted: 2020-11-25

## 2023-11-10 RX ORDER — ASPIRIN 81 MG/1
81 TABLET ORAL DAILY
COMMUNITY

## 2023-11-10 RX ORDER — LOSARTAN POTASSIUM 50 MG/1
100 TABLET ORAL DAILY
COMMUNITY

## 2023-11-10 RX ORDER — AMLODIPINE BESYLATE 5 MG/1
5 TABLET ORAL DAILY
COMMUNITY

## 2023-11-10 RX ORDER — PROPAFENONE HYDROCHLORIDE 150 MG/1
150 TABLET, COATED ORAL EVERY 8 HOURS
COMMUNITY

## 2023-11-10 RX ORDER — TRIAMTERENE AND HYDROCHLOROTHIAZIDE 37.5; 25 MG/1; MG/1
1 CAPSULE ORAL EVERY MORNING
COMMUNITY

## 2023-11-10 RX ORDER — FAMOTIDINE 20 MG/1
20 TABLET, FILM COATED ORAL 2 TIMES DAILY
COMMUNITY

## 2023-11-16 ENCOUNTER — OFFICE VISIT (OUTPATIENT)
Dept: CARDIOLOGY CLINIC | Age: 78
End: 2023-11-16
Payer: MEDICARE

## 2023-11-16 VITALS
HEART RATE: 78 BPM | WEIGHT: 234 LBS | DIASTOLIC BLOOD PRESSURE: 70 MMHG | HEIGHT: 74 IN | SYSTOLIC BLOOD PRESSURE: 124 MMHG | BODY MASS INDEX: 30.03 KG/M2

## 2023-11-16 DIAGNOSIS — I48.0 PAF (PAROXYSMAL ATRIAL FIBRILLATION) (HCC): ICD-10-CM

## 2023-11-16 DIAGNOSIS — Z95.0 PACEMAKER: ICD-10-CM

## 2023-11-16 DIAGNOSIS — I10 ESSENTIAL HYPERTENSION: Primary | ICD-10-CM

## 2023-11-16 DIAGNOSIS — I49.5 SICK SINUS SYNDROME (HCC): ICD-10-CM

## 2023-11-16 DIAGNOSIS — I25.10 CORONARY ARTERY DISEASE INVOLVING NATIVE CORONARY ARTERY OF NATIVE HEART WITHOUT ANGINA PECTORIS: ICD-10-CM

## 2023-11-16 DIAGNOSIS — R06.09 DOE (DYSPNEA ON EXERTION): ICD-10-CM

## 2023-11-16 PROCEDURE — 1036F TOBACCO NON-USER: CPT | Performed by: CLINICAL NURSE SPECIALIST

## 2023-11-16 PROCEDURE — G8427 DOCREV CUR MEDS BY ELIG CLIN: HCPCS | Performed by: CLINICAL NURSE SPECIALIST

## 2023-11-16 PROCEDURE — 1123F ACP DISCUSS/DSCN MKR DOCD: CPT | Performed by: CLINICAL NURSE SPECIALIST

## 2023-11-16 PROCEDURE — 99214 OFFICE O/P EST MOD 30 MIN: CPT | Performed by: CLINICAL NURSE SPECIALIST

## 2023-11-16 PROCEDURE — 93280 PM DEVICE PROGR EVAL DUAL: CPT | Performed by: CLINICAL NURSE SPECIALIST

## 2023-11-16 PROCEDURE — 3078F DIAST BP <80 MM HG: CPT | Performed by: CLINICAL NURSE SPECIALIST

## 2023-11-16 PROCEDURE — 3074F SYST BP LT 130 MM HG: CPT | Performed by: CLINICAL NURSE SPECIALIST

## 2023-11-16 PROCEDURE — G8417 CALC BMI ABV UP PARAM F/U: HCPCS | Performed by: CLINICAL NURSE SPECIALIST

## 2023-11-16 PROCEDURE — G8484 FLU IMMUNIZE NO ADMIN: HCPCS | Performed by: CLINICAL NURSE SPECIALIST

## 2023-11-16 RX ORDER — SEMAGLUTIDE 0.68 MG/ML
INJECTION, SOLUTION SUBCUTANEOUS
COMMUNITY
Start: 2023-11-13

## 2023-11-16 NOTE — PATIENT INSTRUCTIONS
Pacemaker will send in 3 mos   Maintain good blood pressure control-goal<130/80 at rest  Maintain good cholesterol control LDL goal<70 with arterial disease  If you are diabetic work to keep/obtain hemoglobin A1c< 7      Discuss with DR Ida Mace about work up for worsening breating- PFTs and CT of chest     Follow up in 6 mos With pacemaker check   Call with any questions or concerns  Follow up with Chadd Aragon MD for non cardiac problems and labs  Report any new problems  Cardiovascular Fitness-Exercise as tolerated. Strive for 30 minutes of exercise most days of the week. Cardiac / Healthy Diet- Avoid processed high fat foods, maintain low sodium/salt   Continue current medications as directed  Continue plan of treatment  It is always recommended that you bring your medications bottles with you to each visit - this is for your safety!

## 2023-11-16 NOTE — PROGRESS NOTES
60  AP-99.7%   6.0%  Next check 3 months via [employer] home monitoring system      Blood pressure and heart rate well controlled. Medical management includes beta-blocker, ARB and CCB. Remains on Rythmol for arrhythmia control. On low-dose aspirin    Patient is now off metformin and feeling much better. He states his kidney function has improved. His dizziness has resolved    Patient continues to have significant ongoing RITTER. Negative stress and stable echo earlier this year. Does have significant reflux and could have some silent aspiration as this is significantly limited his activity tolerance. He has had recent checkups at [facility] and they have all been good after his nephrectomy    Offered PFTs and CT of chest to evaluate for any pulmonary source of his ongoing symptoms. Could have some interstitial disease if he does have chronic reflux with aspiration. He has a follow-up with his primary care on Monday. He will further discuss this with him. He will let us know if he wants to proceed with testing or do it through his primary care      Negative dobutamine stress test in February 2023  Stable echo  Echo 2/28/2023   Mitral valve leaflets are mildly thickened with preserved leaflet   mobility. Trace mitral regurgitation is present. Aortic valve appears to be tricuspid. No evidence of aortic stenosis or aortic regurgitation. Tricuspid valve is structurally normal.   Mild tricuspid regurgitation. Mild concentric left ventricular hypertrophy. Normal left ventricular size with preserved LV function and an estimated   ejection fraction of approximately 55-60%. No evidence of left ventricular mass or thrombus noted. Normal left ventricular wall thickness. No regional wall motion abnormalities. Impaired relaxation compatible with diastolic dysfunction. ( reversed E/A   ratio)   Mildly enlarged right ventricle cavity. Pacer wire visualized in right ventricle.

## 2023-11-24 ENCOUNTER — TRANSCRIBE ORDERS (OUTPATIENT)
Dept: ADMINISTRATIVE | Facility: HOSPITAL | Age: 78
End: 2023-11-24
Payer: MEDICARE

## 2023-11-24 DIAGNOSIS — R06.09 OTHER FORM OF DYSPNEA: Primary | ICD-10-CM

## 2023-12-05 NOTE — PROGRESS NOTES
Detail Level: Simple History   Bernadine Jackson is a 65 y.o. male who presented to the clinic this date with complaints of dizziness. He reported his legs wouldn't work and he was staggering. He felt like he was drunk. He also noted nausea and vomitting. He reported onset about 2 months ago. He noted symptoms are episodic and last several hours to a day. He has been seen in the ED and all testing was normal. He has been treated with abx and noted symptoms have improved. He noted hearing is decreased in his left ear during episodes. He also noted that ear will pop. He has had long standing tinnitus but denied changes with dizziness. He denied dizziness with positional changes. Summary   Tympanometry consistent with normal TM mobility bilaterally. Pure tone testing indicates normal to severe SNHL bilaterally, with a left asymmetry. Results   Otoscopy:   Right: Clear EAC/Normal TM  Left: Clear EAC/Normal TM    Audiometry:   Right:  Normal to severe  sloping SNHL  Left:  Normal to severe  sloping SNHL         Tympanometry:    Right: Type A  Left: Type A    Plan   Results of today's testing were discussed with Mr. Arnie Gagnon and the following recommendations were made: Follow up with ENT as scheduled. Monitor hearing yearly, sooner with changes. Hearing aid evaluation as desired. Hearing protection as warranted.         Audiogram and Acoustic Immittance Detail Level: Generalized

## 2023-12-20 ENCOUNTER — HOSPITAL ENCOUNTER (OUTPATIENT)
Dept: PULMONOLOGY | Facility: HOSPITAL | Age: 78
Discharge: HOME | End: 2023-12-20
Admitting: FAMILY MEDICINE
Payer: MEDICARE

## 2023-12-20 DIAGNOSIS — R06.09 OTHER FORM OF DYSPNEA: ICD-10-CM

## 2023-12-20 PROCEDURE — 94729 DIFFUSING CAPACITY: CPT

## 2023-12-20 PROCEDURE — 94726 PLETHYSMOGRAPHY LUNG VOLUMES: CPT

## 2023-12-20 PROCEDURE — 94060 EVALUATION OF WHEEZING: CPT

## 2023-12-20 RX ORDER — ALBUTEROL SULFATE 2.5 MG/3ML
2.5 SOLUTION RESPIRATORY (INHALATION) ONCE
Status: COMPLETED | OUTPATIENT
Start: 2023-12-20 | End: 2023-12-20

## 2023-12-20 RX ADMIN — ALBUTEROL SULFATE 2.5 MG: 2.5 SOLUTION RESPIRATORY (INHALATION) at 11:15

## 2024-02-07 DIAGNOSIS — Z95.0 PACEMAKER: ICD-10-CM

## 2024-02-07 DIAGNOSIS — I48.0 PAF (PAROXYSMAL ATRIAL FIBRILLATION) (HCC): ICD-10-CM

## 2024-02-08 RX ORDER — PROPAFENONE HYDROCHLORIDE 150 MG/1
TABLET, COATED ORAL
Qty: 270 TABLET | Refills: 3 | Status: SHIPPED | OUTPATIENT
Start: 2024-02-08

## 2024-02-09 ENCOUNTER — TELEPHONE (OUTPATIENT)
Dept: PODIATRY | Facility: CLINIC | Age: 79
End: 2024-02-09
Payer: MEDICARE

## 2024-02-09 NOTE — TELEPHONE ENCOUNTER
Called patient regarding appt on 02/12/2024. Left message for patient to return call if any questions or concerns arise.

## 2024-02-12 ENCOUNTER — OFFICE VISIT (OUTPATIENT)
Dept: PODIATRY | Facility: CLINIC | Age: 79
End: 2024-02-12
Payer: MEDICARE

## 2024-02-12 VITALS
HEIGHT: 74 IN | WEIGHT: 229 LBS | DIASTOLIC BLOOD PRESSURE: 78 MMHG | SYSTOLIC BLOOD PRESSURE: 136 MMHG | HEART RATE: 80 BPM | BODY MASS INDEX: 29.39 KG/M2 | OXYGEN SATURATION: 97 %

## 2024-02-12 DIAGNOSIS — N18.32 STAGE 3B CHRONIC KIDNEY DISEASE: ICD-10-CM

## 2024-02-12 DIAGNOSIS — E11.40 TYPE 2 DIABETES MELLITUS WITH DIABETIC NEUROPATHY, WITHOUT LONG-TERM CURRENT USE OF INSULIN: ICD-10-CM

## 2024-02-12 DIAGNOSIS — B35.1 ONYCHOMYCOSIS: Primary | ICD-10-CM

## 2024-02-12 DIAGNOSIS — Q66.89 CLAW TOE: ICD-10-CM

## 2024-02-12 PROCEDURE — 3075F SYST BP GE 130 - 139MM HG: CPT | Performed by: NURSE PRACTITIONER

## 2024-02-12 PROCEDURE — 1159F MED LIST DOCD IN RCRD: CPT | Performed by: NURSE PRACTITIONER

## 2024-02-12 PROCEDURE — 1160F RVW MEDS BY RX/DR IN RCRD: CPT | Performed by: NURSE PRACTITIONER

## 2024-02-12 PROCEDURE — 11721 DEBRIDE NAIL 6 OR MORE: CPT | Performed by: NURSE PRACTITIONER

## 2024-02-12 PROCEDURE — 3078F DIAST BP <80 MM HG: CPT | Performed by: NURSE PRACTITIONER

## 2024-04-11 DIAGNOSIS — I49.5 SICK SINUS SYNDROME (HCC): ICD-10-CM

## 2024-04-11 DIAGNOSIS — I48.0 PAF (PAROXYSMAL ATRIAL FIBRILLATION) (HCC): ICD-10-CM

## 2024-04-11 DIAGNOSIS — Z95.0 PACEMAKER: Primary | ICD-10-CM

## 2024-04-11 PROCEDURE — 93294 REM INTERROG EVL PM/LDLS PM: CPT | Performed by: CLINICAL NURSE SPECIALIST

## 2024-04-11 PROCEDURE — 93296 REM INTERROG EVL PM/IDS: CPT | Performed by: CLINICAL NURSE SPECIALIST

## 2024-04-13 ENCOUNTER — HOSPITAL ENCOUNTER (EMERGENCY)
Age: 79
Discharge: HOME | End: 2024-04-13
Attending: PEDIATRICS
Payer: MEDICARE

## 2024-04-13 ENCOUNTER — APPOINTMENT (OUTPATIENT)
Dept: GENERAL RADIOLOGY | Age: 79
End: 2024-04-13
Payer: MEDICARE

## 2024-04-13 VITALS
TEMPERATURE: 98 F | HEIGHT: 74 IN | HEART RATE: 74 BPM | OXYGEN SATURATION: 98 % | WEIGHT: 213 LBS | DIASTOLIC BLOOD PRESSURE: 74 MMHG | BODY MASS INDEX: 27.34 KG/M2 | SYSTOLIC BLOOD PRESSURE: 125 MMHG | RESPIRATION RATE: 16 BRPM

## 2024-04-13 DIAGNOSIS — R06.00 DYSPNEA, UNSPECIFIED TYPE: Primary | ICD-10-CM

## 2024-04-13 DIAGNOSIS — I49.8 ATRIAL ARRHYTHMIA: ICD-10-CM

## 2024-04-13 LAB
ALBUMIN SERPL-MCNC: 4 G/DL (ref 3.5–5.2)
ALLENS TEST: ABNORMAL
ALP SERPL-CCNC: 40 U/L (ref 40–130)
ALT SERPL-CCNC: 16 U/L (ref 5–41)
ANION GAP SERPL CALCULATED.3IONS-SCNC: 12 MMOL/L (ref 7–19)
AST SERPL-CCNC: 16 U/L (ref 5–40)
BASE EXCESS ARTERIAL: -1.7 MMOL/L (ref -2–2)
BASOPHILS # BLD: 0.1 K/UL (ref 0–0.2)
BASOPHILS NFR BLD: 0.9 % (ref 0–1)
BILIRUB SERPL-MCNC: 0.3 MG/DL (ref 0.2–1.2)
BNP BLD-MCNC: 935 PG/ML (ref 0–449)
BUN SERPL-MCNC: 32 MG/DL (ref 8–23)
CALCIUM SERPL-MCNC: 9.1 MG/DL (ref 8.8–10.2)
CARBOXYHEMOGLOBIN ARTERIAL: 1.6 % (ref 0–5)
CHLORIDE SERPL-SCNC: 109 MMOL/L (ref 98–111)
CO2 SERPL-SCNC: 23 MMOL/L (ref 22–29)
CREAT SERPL-MCNC: 1.6 MG/DL (ref 0.5–1.2)
D DIMER PPP FEU-MCNC: 0.66 UG/ML FEU (ref 0–0.48)
EOSINOPHIL # BLD: 0.1 K/UL (ref 0–0.6)
EOSINOPHIL NFR BLD: 1.4 % (ref 0–5)
ERYTHROCYTE [DISTWIDTH] IN BLOOD BY AUTOMATED COUNT: 14.8 % (ref 11.5–14.5)
GLUCOSE SERPL-MCNC: 153 MG/DL (ref 74–109)
HCO3 ARTERIAL: 23 MMOL/L (ref 22–26)
HCT VFR BLD AUTO: 38.5 % (ref 42–52)
HEMOGLOBIN, ART, EXTENDED: 12.3 G/DL (ref 14–18)
HGB BLD-MCNC: 12.1 G/DL (ref 14–18)
IMM GRANULOCYTES # BLD: 0 K/UL
LYMPHOCYTES # BLD: 1.9 K/UL (ref 1.1–4.5)
LYMPHOCYTES NFR BLD: 23.8 % (ref 20–40)
MAGNESIUM SERPL-MCNC: 2.2 MG/DL (ref 1.6–2.4)
MCH RBC QN AUTO: 27.8 PG (ref 27–31)
MCHC RBC AUTO-ENTMCNC: 31.4 G/DL (ref 33–37)
MCV RBC AUTO: 88.5 FL (ref 80–94)
METHEMOGLOBIN ARTERIAL: 1.2 %
MONOCYTES # BLD: 0.5 K/UL (ref 0–0.9)
MONOCYTES NFR BLD: 5.6 % (ref 0–10)
NEUTROPHILS # BLD: 5.5 K/UL (ref 1.5–7.5)
NEUTS SEG NFR BLD: 68.1 % (ref 50–65)
O2 CONTENT ARTERIAL: 16.4 ML/DL
O2 DELIVERY DEVICE: ABNORMAL
O2 SAT, ARTERIAL: 94.3 %
O2 THERAPY: ABNORMAL
PCO2 ARTERIAL: 38 MMHG (ref 35–45)
PH ARTERIAL: 7.39 (ref 7.35–7.45)
PLATELET # BLD AUTO: 239 K/UL (ref 130–400)
PMV BLD AUTO: 11.1 FL (ref 9.4–12.4)
PO2 ARTERIAL: 75 MMHG (ref 80–100)
POTASSIUM BLD-SCNC: 4.4 MMOL/L
POTASSIUM SERPL-SCNC: 4.6 MMOL/L (ref 3.5–5)
PROT SERPL-MCNC: 6.9 G/DL (ref 6.6–8.7)
RBC # BLD AUTO: 4.35 M/UL (ref 4.7–6.1)
SAMPLE SOURCE: ABNORMAL
SODIUM SERPL-SCNC: 144 MMOL/L (ref 136–145)
TROPONIN, HIGH SENSITIVITY: 35 NG/L (ref 0–22)
TROPONIN, HIGH SENSITIVITY: 38 NG/L (ref 0–22)
WBC # BLD AUTO: 8 K/UL (ref 4.8–10.8)

## 2024-04-13 PROCEDURE — 83880 ASSAY OF NATRIURETIC PEPTIDE: CPT

## 2024-04-13 PROCEDURE — 6370000000 HC RX 637 (ALT 250 FOR IP): Performed by: PEDIATRICS

## 2024-04-13 PROCEDURE — 85379 FIBRIN DEGRADATION QUANT: CPT

## 2024-04-13 PROCEDURE — 80053 COMPREHEN METABOLIC PANEL: CPT

## 2024-04-13 PROCEDURE — 83735 ASSAY OF MAGNESIUM: CPT

## 2024-04-13 PROCEDURE — 71045 X-RAY EXAM CHEST 1 VIEW: CPT

## 2024-04-13 PROCEDURE — 82803 BLOOD GASES ANY COMBINATION: CPT

## 2024-04-13 PROCEDURE — 85025 COMPLETE CBC W/AUTO DIFF WBC: CPT

## 2024-04-13 PROCEDURE — 84484 ASSAY OF TROPONIN QUANT: CPT

## 2024-04-13 PROCEDURE — 93005 ELECTROCARDIOGRAM TRACING: CPT | Performed by: PEDIATRICS

## 2024-04-13 PROCEDURE — 36600 WITHDRAWAL OF ARTERIAL BLOOD: CPT

## 2024-04-13 PROCEDURE — 36415 COLL VENOUS BLD VENIPUNCTURE: CPT

## 2024-04-13 PROCEDURE — 99285 EMERGENCY DEPT VISIT HI MDM: CPT

## 2024-04-13 RX ORDER — 0.9 % SODIUM CHLORIDE 0.9 %
1000 INTRAVENOUS SOLUTION INTRAVENOUS ONCE
Status: DISCONTINUED | OUTPATIENT
Start: 2024-04-13 | End: 2024-04-13 | Stop reason: HOSPADM

## 2024-04-13 RX ORDER — ASPIRIN 81 MG/1
324 TABLET, CHEWABLE ORAL ONCE
Status: COMPLETED | OUTPATIENT
Start: 2024-04-13 | End: 2024-04-13

## 2024-04-13 RX ADMIN — APIXABAN 5 MG: 5 TABLET, FILM COATED ORAL at 18:11

## 2024-04-13 RX ADMIN — ASPIRIN 324 MG: 81 TABLET, CHEWABLE ORAL at 15:28

## 2024-04-13 ASSESSMENT — ENCOUNTER SYMPTOMS
RHINORRHEA: 0
SHORTNESS OF BREATH: 1
COLOR CHANGE: 0
BACK PAIN: 0
VOMITING: 0
ABDOMINAL PAIN: 0
NAUSEA: 0
COUGH: 0

## 2024-04-13 ASSESSMENT — PAIN - FUNCTIONAL ASSESSMENT: PAIN_FUNCTIONAL_ASSESSMENT: NONE - DENIES PAIN

## 2024-04-13 NOTE — ED PROVIDER NOTES
[facility] EMERGENCY DEPT  eMERGENCY dEPARTMENT eNCOUnter      Pt Name: Mariana Schneider  MRN: [id number]  Birthdate 1945  Date of evaluation: 4/13/2024  Provider: Dhara Chacon MD    CHIEF COMPLAINT       Chief Complaint   Patient presents with    Shortness of Breath     Pt reports SOA intermittently, worse since Wednesday night. Pt  reports he got a call from cardiologist stating his \"heart had been out of rhythm x9 hours\" Wednesday night/Thursday morning. Reports working outside and couldn't catch his breath         HISTORY OF PRESENT ILLNESS   (Location/Symptom, Timing/Onset,Context/Setting, Quality, Duration, Modifying Factors, Severity)  Note limiting factors.   Mariana Schneider is a 78 y.o. male who presents to the emergency department with shortness of breath.  Patient states that he became short of breath on Wednesday night around 6 PM.  Patient states that the cardiologist office called him to tell him his \"heart has been out of rhythm.\"  Patient states his heart was out of rhythm for approximately 9 hours between Wednesday night and Thursday morning.  Patient states he has been intermittently short of breath since that time.  Today, patient was working in his yard.  \"I could not get my breath.\"  Exertion worsens symptoms.  Rest improves symptoms.  Patient denies chest pain, cough, fever, lower extremity swelling, or black or bloody stools.  Patient has a pacer in place.  Patient's primary care provider is Dr. Cruz and his cardiologist is Dr. Posey.  Patient's last heart catheterization was in 2018 with ejection fraction of 45%.  Patient had a pacemaker placed in 2012 for sick sinus syndrome with paroxysmal atrial fibrillation.  Battery for pacemaker replaced 3/25/2021.  Patient had a dobutamine stress echo in February 2023 without evidence of myocardial ischemia.  Ejection fraction was 55 to 60%.    HPI    NursingNotes were reviewed.    REVIEW OF SYSTEMS    (2-9 systems for level 4, 10 or more for level 5)  Coordination: Coordination normal.   Psychiatric:         Mood and Affect: Mood normal.         Behavior: Behavior normal.         DIAGNOSTIC RESULTS     EKG: All EKG's areinterpreted by the Emergency Department Physician who either signs or Co-signs this chart in the absence of a cardiologist.    EKG dated 4/13/2024 at 1350 7 PM: Atrial paced rhythm.  Right bundle branch block.  Rate 77.    RADIOLOGY:  Non-plain film images such as CT, Ultrasound and MRI are read by the radiologist. Plain radiographic images are visualized and preliminarily interpreted bythe emergency physician with the below findings:          XR CHEST PORTABLE   Final Result   1.  No acute cardiopulmonary disease.               ______________________________________    Electronically signed by: HOMA PINON M.D.   Date:     04/13/2024   Time:    15:44               LABS:  Labs Reviewed   CBC WITH AUTO DIFFERENTIAL - Abnormal; Notable for the following components:       Result Value    RBC 4.35 (*)     Hemoglobin 12.1 (*)     Hematocrit 38.5 (*)     MCHC 31.4 (*)     RDW 14.8 (*)     Neutrophils % 68.1 (*)     All other components within normal limits   COMPREHENSIVE METABOLIC PANEL - Abnormal; Notable for the following components:    Glucose 153 (*)     BUN 32 (*)     Creatinine 1.6 (*)     Est, Glom Filt Rate 43 (*)     All other components within normal limits   D-DIMER, QUANTITATIVE - Abnormal; Notable for the following components:    D-Dimer, Quant 0.66 (*)     All other components within normal limits   TROPONIN - Abnormal; Notable for the following components:    Troponin, High Sensitivity 38 (*)     All other components within normal limits   BRAIN NATRIURETIC PEPTIDE - Abnormal; Notable for the following components:    Pro- (*)     All other components within normal limits   BLOOD GAS, ARTERIAL - Abnormal; Notable for the following components:    pO2, Arterial 75.0 (*)     Hemoglobin, Art, Extended 12.3 (*)     All other components

## 2024-04-13 NOTE — DISCHARGE INSTRUCTIONS
Return or seek medical attention with chest discomfort, shortness of breath, sweatiness, vomiting, palpitations, or other concerns.  Follow-up with Dr. Cruz on Monday as scheduled.  Follow-up with Dr. Posey, cardiologist.  They will call you on Monday with appointment.  Limit your exertion.

## 2024-04-15 ENCOUNTER — TELEPHONE (OUTPATIENT)
Dept: CARDIOLOGY CLINIC | Age: 79
End: 2024-04-15

## 2024-04-15 LAB
EKG P AXIS: 77 DEGREES
EKG P-R INTERVAL: 254 MS
EKG Q-T INTERVAL: 404 MS
EKG QRS DURATION: 146 MS
EKG QTC CALCULATION (BAZETT): 433 MS
EKG T AXIS: 39 DEGREES

## 2024-04-15 PROCEDURE — 93010 ELECTROCARDIOGRAM REPORT: CPT | Performed by: INTERNAL MEDICINE

## 2024-04-15 NOTE — TELEPHONE ENCOUNTER
Called patient to try to get them in for an appt for their afib with Gissell Nava. Patient can r/s with Gissell. 4/15/24 AH

## 2024-04-16 ENCOUNTER — OFFICE VISIT (OUTPATIENT)
Dept: CARDIOLOGY CLINIC | Age: 79
End: 2024-04-16
Payer: MEDICARE

## 2024-04-16 VITALS
SYSTOLIC BLOOD PRESSURE: 124 MMHG | HEIGHT: 74 IN | WEIGHT: 220 LBS | BODY MASS INDEX: 28.23 KG/M2 | OXYGEN SATURATION: 97 % | DIASTOLIC BLOOD PRESSURE: 70 MMHG | HEART RATE: 86 BPM

## 2024-04-16 DIAGNOSIS — I48.0 PAF (PAROXYSMAL ATRIAL FIBRILLATION) (HCC): Primary | ICD-10-CM

## 2024-04-16 DIAGNOSIS — R06.09 DOE (DYSPNEA ON EXERTION): ICD-10-CM

## 2024-04-16 DIAGNOSIS — Z95.0 PACEMAKER: ICD-10-CM

## 2024-04-16 DIAGNOSIS — I49.5 SICK SINUS SYNDROME (HCC): ICD-10-CM

## 2024-04-16 DIAGNOSIS — I10 ESSENTIAL HYPERTENSION: ICD-10-CM

## 2024-04-16 PROCEDURE — G8427 DOCREV CUR MEDS BY ELIG CLIN: HCPCS | Performed by: CLINICAL NURSE SPECIALIST

## 2024-04-16 PROCEDURE — 3074F SYST BP LT 130 MM HG: CPT | Performed by: CLINICAL NURSE SPECIALIST

## 2024-04-16 PROCEDURE — 1036F TOBACCO NON-USER: CPT | Performed by: CLINICAL NURSE SPECIALIST

## 2024-04-16 PROCEDURE — 1123F ACP DISCUSS/DSCN MKR DOCD: CPT | Performed by: CLINICAL NURSE SPECIALIST

## 2024-04-16 PROCEDURE — G8417 CALC BMI ABV UP PARAM F/U: HCPCS | Performed by: CLINICAL NURSE SPECIALIST

## 2024-04-16 PROCEDURE — 93280 PM DEVICE PROGR EVAL DUAL: CPT | Performed by: CLINICAL NURSE SPECIALIST

## 2024-04-16 PROCEDURE — 3078F DIAST BP <80 MM HG: CPT | Performed by: CLINICAL NURSE SPECIALIST

## 2024-04-16 PROCEDURE — 99214 OFFICE O/P EST MOD 30 MIN: CPT | Performed by: CLINICAL NURSE SPECIALIST

## 2024-04-16 NOTE — PATIENT INSTRUCTIONS
Echo and stress test soon    Start Jardiance 10mg daily - let us know how you are tolerating after 2 weeks    Would recommend seeing pulmonology    Pacemaker will send in 3 mos   Maintain good blood pressure control-goal<130/80 at rest  Maintain good cholesterol control LDL goal<70 with arterial disease  If you are diabetic work to keep/obtain hemoglobin A1c< 7      Follow up in 6 mos With pacemaker check   Call with any questions or concerns  Follow up with Ashutosh Cruz MD for non cardiac problems and labs  Report any new problems  Cardiovascular Fitness-Exercise as tolerated.  Strive for 30 minutes of exercise most days of the week.    Cardiac / Healthy Diet- Avoid processed high fat foods, maintain low sodium/salt   Continue current medications as directed  Continue plan of treatment  It is always recommended that you bring your medications bottles with you to each visit - this is for your safety!

## 2024-04-16 NOTE — PROGRESS NOTES
[facility] Cardiology  [address] [place] [address]  Phone: [phone]  Fax: [phone]    OFFICE VISIT:  2024    Mariana Schneider - : 1945    Reason For Visit:  Mariana is a 78 y.o. male who is here for Follow-up (Pt has had palpitations) and Atrial Fibrillation  History of nonocclusive coronary disease with last heart catheterization in  EF 45%.  Pacemaker placed in  for sick sinus syndrome with paroxysmal atrial fibrillation controlled on Rythmol. Had generator replacement 3/25/2021    Patient is had some ongoing dizziness.  In 2023 had dobutamine stress echo that showed no evidence of myocardial ischemia.  2D echo showed normal LV size and function with EF 55 to 60%.  Mild diastolic dysfunction.  No significant valvular disease.   He was taken off his metformin and much of his symptomology resolved.    Pacemaker remote transmission received on 2024 showing an 11-hour episode of atrial fibrillation.  Repeat [facility] showed back in rhythm    On 2024 patient was seen in the emergency room for shortness of breath.  EKG showed atrial paced rhythm  BNP slightly elevated at 935  Troponins slightly elevated but trending down  H/H slightly lower than baseline.  CMP stable    He is here today in follow-up accompanied with his wife.  He states the day he was at a rhythm he was at [employer].  Suddenly felt sick and had nausea vomiting and diarrhea.  Felt poorly in the next day.  Those symptoms have resolved.  He continues to have ongoing significant shortness of breath.  Says it is slowly steadily getting worse  Reports that he did have a breathing test at [facility] but does not know results    He reports that they told him in the emergency room he had A-fib and put him on Eliquis.  He is taking that but stop the aspirin      Subjective  Mariana denies exertional chest pain, resting shortness of breath, orthopnea, paroxysmal nocturnal dyspnea, syncope, presyncope,

## 2024-04-30 ENCOUNTER — OFFICE VISIT (OUTPATIENT)
Dept: CARDIOLOGY | Facility: CLINIC | Age: 79
End: 2024-04-30
Payer: MEDICARE

## 2024-04-30 VITALS
HEIGHT: 74 IN | HEART RATE: 76 BPM | SYSTOLIC BLOOD PRESSURE: 106 MMHG | BODY MASS INDEX: 27.85 KG/M2 | WEIGHT: 217 LBS | OXYGEN SATURATION: 98 % | DIASTOLIC BLOOD PRESSURE: 64 MMHG

## 2024-04-30 DIAGNOSIS — I48.0 PAROXYSMAL ATRIAL FIBRILLATION: Primary | ICD-10-CM

## 2024-04-30 DIAGNOSIS — I10 BENIGN ESSENTIAL HYPERTENSION: ICD-10-CM

## 2024-04-30 DIAGNOSIS — R06.09 DYSPNEA ON EXERTION: ICD-10-CM

## 2024-04-30 DIAGNOSIS — Z79.01 CHRONIC ANTICOAGULATION: ICD-10-CM

## 2024-04-30 DIAGNOSIS — I25.10 NONOBSTRUCTIVE ATHEROSCLEROSIS OF CORONARY ARTERY: ICD-10-CM

## 2024-04-30 DIAGNOSIS — Z95.0 PACEMAKER: ICD-10-CM

## 2024-04-30 PROBLEM — I49.9 CARDIAC ARRHYTHMIA: Status: RESOLVED | Noted: 2019-04-17 | Resolved: 2024-04-30

## 2024-04-30 RX ORDER — SEMAGLUTIDE 0.68 MG/ML
1 INJECTION, SOLUTION SUBCUTANEOUS
COMMUNITY
Start: 2023-10-01

## 2024-04-30 RX ORDER — APIXABAN 5 MG/1
TABLET, FILM COATED ORAL
COMMUNITY
Start: 2024-04-13

## 2024-04-30 NOTE — PROGRESS NOTES
Reason for Visit: Atrial fibrillation.    HPI:  Jero Higgins is a 78 y.o. male is being seen for consultation today at the request of Colten Otto MD for assistance with management of atrial fibrillation.  He previously had a pacemaker placed in 2012 for sick sinus syndrome with paroxysmal atrial fibrillation.  He recently had episode of atrial fibrillation while he was in [employer].  This was associated with nausea and vomiting and was confirmed on pacemaker interrogation.  He has been getting progressively more short of breath when he exerts himself.  He feels more out of breath when he is out of rhythm.  He has a repeat echo and stress echo scheduled on May 30th.  He has been less active since he has felt more short of breath.      Previous Cardiac Testing and Procedures:  -Pacemaker placement (2012) for sick sinus syndrome  -[facility] (12/6/2012) 55% ramus, 30% RCA, otherwise luminal irregularities  -[facility] (7/16/2018) nonobstructive coronary atherosclerosis  -Pacemaker generator change (3/25/2021) [employer]  -Dobutamine stress echo (2/28/2023) no clinical, electrocardiographic, or echocardiographic evidence of ischemia  -Echo (2/28/2023) EF 55-60%, impaired relaxation, mild RV enlargement, normal LA and RA, mild TR  -PFTs (12/2023) moderate obstructive ventilatory defect, slight decrease in inspiratory capacity, mild diffusion impairment that is normal when corrected for alveolar volume  -Chest x-ray (4/13/2024) no consolidation, effusion, or pneumothorax, cardiomediastinal silhouette and pulmonary vessels are within normal limits, pacemaker in place    Lab data:  -BMP (3/20/2024) creatinine 1.49, GFR 47, potassium 4.3, sodium 140  -CBC (3/20/2024) WBC 8.9, hemoglobin 12.5, platelets 260  -proBNP (4/13/2024) 935, normal 0-450    Patient Active Problem List   Diagnosis    Alternating constipation and diarrhea    Benign essential hypertension    Bilateral impacted cerumen    Cholelithiasis without obstruction     Nonobstructive atherosclerosis of coronary artery    Gastroesophageal reflux disease without esophagitis    Personal history of kidney cancer    Hyperglycemia due to type 2 diabetes mellitus    Meniere's disease of both ears    Stage 3b chronic kidney disease    Sick sinus syndrome    Renal cell carcinoma    Pacemaker    Vertigo    Paroxysmal atrial fibrillation    Chronic anticoagulation       Social History     Tobacco Use    Smoking status: Former     Current packs/day: 0.00     Types: Cigarettes     Quit date:      Years since quittin.3    Smokeless tobacco: Never   Vaping Use    Vaping status: Never Used   Substance Use Topics    Alcohol use: Yes     Comment: occ    Drug use: Never       History reviewed. No pertinent family history.    The following portions of the patient's history were reviewed and updated as appropriate: allergies, current medications, past family history, past medical history, past social history, past surgical history, and problem list.      Current Outpatient Medications:     amLODIPine (NORVASC) 5 MG tablet, Take 1 tablet by mouth Daily., Disp: , Rfl:     Eliquis 5 MG tablet tablet, , Disp: , Rfl:     empagliflozin (JARDIANCE) 10 MG tablet tablet, Take 1 tablet by mouth Daily., Disp: , Rfl:     famotidine (PEPCID) 20 MG tablet, Take 1 tablet by mouth 2 (Two) Times a Day., Disp: , Rfl:     losartan (COZAAR) 50 MG tablet, Take 2 tablets by mouth Daily., Disp: , Rfl:     metoprolol tartrate (LOPRESSOR) 25 MG tablet, Take 1 tablet by mouth 2 (Two) Times a Day., Disp: , Rfl:     propafenone (RYTHMOL) 150 MG tablet, Take 1 tablet by mouth Every 8 (Eight) Hours., Disp: , Rfl:     Semaglutide,0.25 or 0.5MG/DOS, (Ozempic, 0.25 or 0.5 MG/DOSE,) 2 MG/3ML solution pen-injector, 1 mg., Disp: , Rfl:     Review of Systems   Constitutional: Negative for chills and fever.   Cardiovascular:  Positive for dyspnea on exertion. Negative for chest pain and paroxysmal nocturnal dyspnea.  "  Respiratory:  Positive for shortness of breath. Negative for cough.    Skin:  Positive for color change. Negative for rash.   Musculoskeletal:  Positive for muscle weakness.   Gastrointestinal:  Positive for nausea. Negative for abdominal pain and heartburn.   Neurological:  Positive for disturbances in coordination and weakness. Negative for dizziness and numbness.       Objective   /64 (BP Location: Left arm, Patient Position: Sitting, Cuff Size: Adult)   Pulse 76   Ht 188 cm (74\")   Wt 98.4 kg (217 lb)   SpO2 98%   BMI 27.86 kg/m²   Constitutional:       Appearance: Well-developed.   HENT:      Head: Normocephalic and atraumatic.   Pulmonary:      Effort: Pulmonary effort is normal.      Breath sounds: Normal breath sounds.   Cardiovascular:      Normal rate. Regular rhythm.   Edema:     Peripheral edema absent.   Skin:     General: Skin is warm and dry.   Neurological:      Mental Status: Alert and oriented to person, place, and time.         ECG 12 Lead    Date/Time: 4/30/2024 2:31 PM  Performed by: Mike Higgins MD    Authorized by: Mike Higgins MD  Previous ECG: no previous ECG available  Rhythm: paced  Rate: normal  Conduction: right bundle branch block              ICD-10-CM ICD-9-CM   1. Paroxysmal atrial fibrillation  I48.0 427.31   2. Pacemaker  Z95.0 V45.01   3. Dyspnea on exertion  R06.09 786.09   4. Nonobstructive atherosclerosis of coronary artery  I25.10 414.00   5. Benign essential hypertension  I10 401.1   6. Chronic anticoagulation  Z79.01 V58.61         Assessment/Plan:  1.  Paroxysmal atrial fibrillation: Relatively low burden of 3.4% on pacemaker interrogation today.  It is unclear to what degree atrial fibrillation is contributing to his symptoms.  Treatment options consist of continuing current therapy, adjusting antiarrhythmics by either increasing propafenone or switching to an alternative, or EP evaluation.  Patient elects to continue current treatment for now.    2.  " Cardiac pacemaker: Normal function with intermittent episodes of atrial fibrillation with a burden of 3.4% on pacemaker interrogation today.    3.  Dyspnea on exertion: Likely multifactorial.  He had moderate obstructive disease on recent PFTs.  proBNP was mildly elevated at.  He had only grade 1 diastolic dysfunction with normal LA size arguing against any significant diastolic CHF.  There is no evidence of fluid overload on recent chest x-ray from 4/13/2024.  He has a repeat echo and stress echo pending at [facility], which is reasonable.  Physical deconditioning is also likely contributing.    4.  Nonobstructive coronary artery disease: Noted on previous heart cath from 2012 and 2018.  Low risk stress echo on 2/28/2023.    5.  Essential hypertension: Blood pressure is well-controlled today.  Continue losartan and amlodipine.    6.  Chronic anticoagulation: Continue Eliquis.

## 2024-04-30 NOTE — LETTER
April 30, 2024     Colten Otto MD  [address]  [place] [address]    Patient: Jero Higgins   YOB: 1945   Date of Visit: 4/30/2024       Dear Colten Otto MD,    Thank you for referring Jero Higgins to me for evaluation. Below is a copy of my consult note.    If you have questions, please do not hesitate to call me. I look forward to following Jero along with you.         Sincerely,        Mike Higgins MD        CC: No Recipients      Reason for Visit: Atrial fibrillation.    HPI:  Jero Higgins is a 78 y.o. male is being seen for consultation today at the request of Colten Otto MD for assistance with management of atrial fibrillation.  He previously had a pacemaker placed in 2012 for sick sinus syndrome with paroxysmal atrial fibrillation.  He recently had episode of atrial fibrillation while he was in [place].  This was associated with nausea and vomiting and was confirmed on pacemaker interrogation.  He has been getting progressively more short of breath when he exerts himself.  He feels more out of breath when he is out of rhythm.  He has a repeat echo and stress echo scheduled on May 30th.  He has been less active since he has felt more short of breath.      Previous Cardiac Testing and Procedures:  -Pacemaker placement (2012) for sick sinus syndrome  -[facility] (12/6/2012) 55% ramus, 30% RCA, otherwise luminal irregularities  -[facility] (7/16/2018) nonobstructive coronary atherosclerosis  -Pacemaker generator change (3/25/2021) [employer]  -Dobutamine stress echo (2/28/2023) no clinical, electrocardiographic, or echocardiographic evidence of ischemia  -Echo (2/28/2023) EF 55-60%, impaired relaxation, mild RV enlargement, normal LA and RA, mild TR  -PFTs (12/2023) moderate obstructive ventilatory defect, slight decrease in inspiratory capacity, mild diffusion impairment that is normal when corrected for alveolar volume  -Chest x-ray (4/13/2024) no consolidation, effusion, or  pneumothorax, cardiomediastinal silhouette and pulmonary vessels are within normal limits, pacemaker in place    Lab data:  -BMP (3/20/2024) creatinine 1.49, GFR 47, potassium 4.3, sodium 140  -CBC (3/20/2024) WBC 8.9, hemoglobin 12.5, platelets 260  -proBNP (2024) 935, normal 0-450    Patient Active Problem List   Diagnosis   • Alternating constipation and diarrhea   • Benign essential hypertension   • Bilateral impacted cerumen   • Cholelithiasis without obstruction   • Nonobstructive atherosclerosis of coronary artery   • Gastroesophageal reflux disease without esophagitis   • Personal history of kidney cancer   • Hyperglycemia due to type 2 diabetes mellitus   • Meniere's disease of both ears   • Stage 3b chronic kidney disease   • Sick sinus syndrome   • Renal cell carcinoma   • Pacemaker   • Vertigo   • Paroxysmal atrial fibrillation   • Chronic anticoagulation       Social History     Tobacco Use   • Smoking status: Former     Current packs/day: 0.00     Types: Cigarettes     Quit date:      Years since quittin.3   • Smokeless tobacco: Never   Vaping Use   • Vaping status: Never Used   Substance Use Topics   • Alcohol use: Yes     Comment: occ   • Drug use: Never       History reviewed. No pertinent family history.    The following portions of the patient's history were reviewed and updated as appropriate: allergies, current medications, past family history, past medical history, past social history, past surgical history, and problem list.      Current Outpatient Medications:   •  amLODIPine (NORVASC) 5 MG tablet, Take 1 tablet by mouth Daily., Disp: , Rfl:   •  Eliquis 5 MG tablet tablet, , Disp: , Rfl:   •  empagliflozin (JARDIANCE) 10 MG tablet tablet, Take 1 tablet by mouth Daily., Disp: , Rfl:   •  famotidine (PEPCID) 20 MG tablet, Take 1 tablet by mouth 2 (Two) Times a Day., Disp: , Rfl:   •  losartan (COZAAR) 50 MG tablet, Take 2 tablets by mouth Daily., Disp: , Rfl:   •  metoprolol  "tartrate (LOPRESSOR) 25 MG tablet, Take 1 tablet by mouth 2 (Two) Times a Day., Disp: , Rfl:   •  propafenone (RYTHMOL) 150 MG tablet, Take 1 tablet by mouth Every 8 (Eight) Hours., Disp: , Rfl:   •  Semaglutide,0.25 or 0.5MG/DOS, (Ozempic, 0.25 or 0.5 MG/DOSE,) 2 MG/3ML solution pen-injector, 1 mg., Disp: , Rfl:     Review of Systems   Constitutional: Negative for chills and fever.   Cardiovascular:  Positive for dyspnea on exertion. Negative for chest pain and paroxysmal nocturnal dyspnea.   Respiratory:  Positive for shortness of breath. Negative for cough.    Skin:  Positive for color change. Negative for rash.   Musculoskeletal:  Positive for muscle weakness.   Gastrointestinal:  Positive for nausea. Negative for abdominal pain and heartburn.   Neurological:  Positive for disturbances in coordination and weakness. Negative for dizziness and numbness.       Objective  /64 (BP Location: Left arm, Patient Position: Sitting, Cuff Size: Adult)   Pulse 76   Ht 188 cm (74\")   Wt 98.4 kg (217 lb)   SpO2 98%   BMI 27.86 kg/m²   Constitutional:       Appearance: Well-developed.   HENT:      Head: Normocephalic and atraumatic.   Pulmonary:      Effort: Pulmonary effort is normal.      Breath sounds: Normal breath sounds.   Cardiovascular:      Normal rate. Regular rhythm.   Edema:     Peripheral edema absent.   Skin:     General: Skin is warm and dry.   Neurological:      Mental Status: Alert and oriented to person, place, and time.         ECG 12 Lead    Date/Time: 4/30/2024 2:31 PM  Performed by: Mike Higgins MD    Authorized by: Mike Higgins MD  Previous ECG: no previous ECG available  Rhythm: paced  Rate: normal  Conduction: right bundle branch block              ICD-10-CM ICD-9-CM   1. Paroxysmal atrial fibrillation  I48.0 427.31   2. Pacemaker  Z95.0 V45.01   3. Dyspnea on exertion  R06.09 786.09   4. Nonobstructive atherosclerosis of coronary artery  I25.10 414.00   5. Benign essential " hypertension  I10 401.1   6. Chronic anticoagulation  Z79.01 V58.61         Assessment/Plan:  1.  Paroxysmal atrial fibrillation: Relatively low burden of 3.4% on pacemaker interrogation today.  It is unclear to what degree atrial fibrillation is contributing to his symptoms.  Treatment options consist of continuing current therapy, adjusting antiarrhythmics by either increasing propafenone or switching to an alternative, or EP evaluation.  Patient elects to continue current treatment for now.    2.  Cardiac pacemaker: Normal function with intermittent episodes of atrial fibrillation with a burden of 3.4% on pacemaker interrogation today.    3.  Dyspnea on exertion: Likely multifactorial.  He had moderate obstructive disease on recent PFTs.  proBNP was mildly elevated at.  He had only grade 1 diastolic dysfunction with normal LA size arguing against any significant diastolic CHF.  There is no evidence of fluid overload on recent chest x-ray from 4/13/2024.  He has a repeat echo and stress echo pending at [facility], which is reasonable.  Physical deconditioning is also likely contributing.    4.  Nonobstructive coronary artery disease: Noted on previous heart cath from 2012 and 2018.  Low risk stress echo on 2/28/2023.    5.  Essential hypertension: Blood pressure is well-controlled today.  Continue losartan and amlodipine.    6.  Chronic anticoagulation: Continue Eliquis.

## 2024-05-07 NOTE — PROGRESS NOTES
[facility] - PODIATRY    Today's Date: 2024     Patient Name: Jero Higgins  MRN: [id number]  CSN: [id number]  PCP: Colten Otto MD  Referring Provider: No ref. provider found    SUBJECTIVE     Chief Complaint   Patient presents with    Follow-up     Colten Otto MD 2024 3 MTH FU DIABETIC- pt states feet doing ok, about the same as always- pt denies pain     Diabetes     104mg/dl BG this am      HPI: Jero Higgins, a 78 y.o.male, comes to clinic as a(n) established patient presenting for diabetic foot exam and complaining of toenail/callus issues. Patient has h/o cancer, CHF, DM2, HTN . Patient is NIDDM with last stated BG level of 104mg/dl. Relates only mild numbness in feet. Denies open wounds or sores. Notes that his toenails are long, thick and crumbly. He has trouble caring for his toenails himself. Patient has noticed callused area to left 2nd toe.   Denies pain. Denies previous treatment. Denies any constitutional symptoms. No other pedal complaints at this time.    Past Medical History:   Diagnosis Date    Cancer     Congestive cardiac failure     Diabetes mellitus     Hypertension      Past Surgical History:   Procedure Laterality Date    APPENDECTOMY      PACEMAKER IMPLANTATION       History reviewed. No pertinent family history.  Social History     Socioeconomic History    Marital status:    Tobacco Use    Smoking status: Former     Current packs/day: 0.00     Types: Cigarettes     Quit date:      Years since quittin.3    Smokeless tobacco: Never   Vaping Use    Vaping status: Never Used   Substance and Sexual Activity    Alcohol use: Yes     Comment: occ    Drug use: Never    Sexual activity: Defer     Allergies   Allergen Reactions    Lisinopril Cough    Metformin And Related Nausea And Vomiting     Current Outpatient Medications   Medication Sig Dispense Refill    amLODIPine (NORVASC) 5 MG tablet Take 1 tablet by mouth Daily.      Eliquis 5  MG tablet tablet       empagliflozin (JARDIANCE) 10 MG tablet tablet Take 1 tablet by mouth Daily.      famotidine (PEPCID) 20 MG tablet Take 1 tablet by mouth 2 (Two) Times a Day.      losartan (COZAAR) 50 MG tablet Take 2 tablets by mouth Daily.      metoprolol tartrate (LOPRESSOR) 25 MG tablet Take 1 tablet by mouth 2 (Two) Times a Day.      propafenone (RYTHMOL) 150 MG tablet Take 1 tablet by mouth Every 8 (Eight) Hours.      Semaglutide,0.25 or 0.5MG/DOS, (Ozempic, 0.25 or 0.5 MG/DOSE,) 2 MG/3ML solution pen-injector 1 mg.       No current facility-administered medications for this visit.     Review of Systems   Constitutional:  Negative for chills and fever.   HENT:  Negative for congestion.    Respiratory:  Negative for shortness of breath.    Cardiovascular:  Negative for chest pain and leg swelling.   Gastrointestinal:  Negative for constipation, diarrhea, nausea and vomiting.   Musculoskeletal:  Positive for arthralgias.   Skin:  Negative for wound.   Neurological:  Negative for numbness.   Psychiatric/Behavioral:  Negative for agitation.        OBJECTIVE     Vitals:    05/13/24 1315   BP: 116/58   Pulse: 75   SpO2: 98%     PHYSICAL EXAM  GEN:   Accompanied by none.     Foot/Ankle Exam    GENERAL  Diabetic foot exam performed    Appearance:  appears stated age  Orientation:  AAOx3  Affect:  appropriate  Gait:  unimpaired  Assistance:  independent  Right shoe gear: casual shoe  Left shoe gear: casual shoe    VASCULAR     Right Foot Vascularity   Dorsalis pedis:  2+  Posterior tibial:  2+  Skin temperature:  warm  Edema grading:  None  CFT:  3  Pedal hair growth:  Present  Varicosities:  none     Left Foot Vascularity   Dorsalis pedis:  2+  Posterior tibial:  2+  Skin temperature:  warm  Edema grading:  None  CFT:  3  Pedal hair growth:  Present  Varicosities:  none     NEUROLOGIC     Right Foot Neurologic   Light touch sensation: diminished  Vibratory sensation: diminished  Hot/Cold sensation:  diminished  Protective Sensation using [place]by-Ayaan Monofilament:   Sites intact: 9  Sites tested: 10     Left Foot Neurologic   Light touch sensation: diminished  Vibratory sensation: diminished  Hot/Cold sensation:  diminished  Protective Sensation using [place]by-Ayaan Monofilament:   Sites intact: 10  Sites tested: 10    MUSCULOSKELETAL     Right Foot Musculoskeletal   Ecchymosis:  none  Tenderness:  toenail problem    Arch:  Normal  Claw toe:  Second toe, third toe, fourth toe and fifth toe     Left Foot Musculoskeletal   Ecchymosis:  none  Tenderness:  toenail problem  Arch:  Normal  Claw toe:  Second toe, third toe, fourth toe and fifth toe    MUSCLE STRENGTH     Right Foot Muscle Strength   Foot dorsiflexion:  5  Foot plantar flexion:  5  Foot inversion:  5  Foot eversion:  5     Left Foot Muscle Strength   Foot dorsiflexion:  5  Foot plantar flexion:  5  Foot inversion:  5  Foot eversion:  5    RANGE OF MOTION     Right Foot Range of Motion   Foot and ankle ROM within normal limits       Left Foot Range of Motion   Foot and ankle ROM within normal limits      DERMATOLOGIC      Right Foot Dermatologic   Skin  Right foot skin is intact.   Nails  1.  Positive for elongated, onychomycosis, abnormal thickness, subungual debris and dystrophic nail.  2.  Positive for elongated, onychomycosis, abnormal thickness and subungual debris.  3.  Positive for elongated, onychomycosis, abnormal thickness and subungual debris.  4.  Positive for elongated, onychomycosis, abnormal thickness and subungual debris.  5.  Positive for elongated, onychomycosis, abnormal thickness and subungual debris.     Left Foot Dermatologic   Skin  Left foot skin is intact.   Nails  1.  Positive for elongated, onychomycosis, abnormal thickness, subungual debris and dystrophic nail.  2.  Positive for elongated, onychomycosis, abnormal thickness and subungual debris.  3.  Positive for elongated, onychomycosis, abnormal thickness and subungual  debris.  4.  Positive for elongated, onychomycosis, abnormally thick and subungual debris.  5.  Positive for elongated, onychomycosis, abnormally thick and subungual debris.    Image:       RADIOLOGY/NUCLEAR:  XR Chest 1 View    Result Date: 4/13/2024  Narrative: EXAM: CHEST RADIOGRAPH TECHNIQUE: Single frontal chest radiograph. HISTORY: Shortness of breath. COMPARISON: 08/12/2022 FINDINGS: Lungs show no consolidation, pleural effusion or pneumothorax. Cardiomediastinal silhouette and pulmonary vessels within normal limits.  Stable left subclavian two lead pacemaker. The upper abdomen is unremarkable. No acute bony abnormality.    Impression: 1.  No acute cardiopulmonary disease. ______________________________________ Electronically signed by: MARY CASAREZ M.D. Date:     04/13/2024 Time:    15:44      LABORATORY/CULTURE RESULTS:      PATHOLOGY RESULTS:       [id number]/PLAN     Diagnoses and all orders for this visit:    1. Onychomycosis (Primary)    2. Type 2 diabetes mellitus with diabetic neuropathy, without long-term current use of insulin    3. Stage 3b chronic kidney disease    4. Claw toe      Comprehensive lower extremity examination and evaluation was performed.  Discussed findings and treatment plan including risks, benefits, and treatment options with patient in detail. Patient agreed with treatment plan.  After verbal consent obtained, nail(s) x10 debrided of length and thickness with nail nipper without incidence  After verbal consent obtained, calluses x1 pared utilizing dermal curette and/or scalpel without incidence  Patient may maintain nails and calluses at home utilizing emery board or pumice stone between visits as needed  Reviewed at home diabetic foot care including daily foot checks   Continue BG control with assistance of PCP.  An After Visit Summary was printed and given to the patient at discharge, including (if requested) any available informative/educational handouts regarding  diagnosis, treatment, or medications. All questions were answered to patient/family satisfaction. Should symptoms fail to improve or worsen they agree to call or return to clinic or to go to the Emergency Department. Discussed the importance of following up with any needed screening tests/labs/specialist appointments and any requested follow-up recommended by me today. Importance of maintaining follow-up discussed and patient accepts that missed appointments can delay diagnosis and potentially lead to worsening of conditions.  Return in about 3 months (around 8/13/2024) for Follow-up with Podiatry APRN, Schedule [facility]., or sooner if acute issues arise.        This document has been electronically signed by KAIT Gee on May 13, 2024 13:17 CDT

## 2024-05-09 ENCOUNTER — TELEPHONE (OUTPATIENT)
Dept: CARDIOLOGY CLINIC | Age: 79
End: 2024-05-09

## 2024-05-10 ENCOUNTER — TELEPHONE (OUTPATIENT)
Dept: PODIATRY | Facility: CLINIC | Age: 79
End: 2024-05-10
Payer: MEDICARE

## 2024-05-13 ENCOUNTER — OFFICE VISIT (OUTPATIENT)
Dept: PODIATRY | Facility: CLINIC | Age: 79
End: 2024-05-13
Payer: MEDICARE

## 2024-05-13 VITALS
HEIGHT: 74 IN | OXYGEN SATURATION: 98 % | SYSTOLIC BLOOD PRESSURE: 116 MMHG | WEIGHT: 216 LBS | BODY MASS INDEX: 27.72 KG/M2 | HEART RATE: 75 BPM | DIASTOLIC BLOOD PRESSURE: 58 MMHG

## 2024-05-13 DIAGNOSIS — B35.1 ONYCHOMYCOSIS: Primary | ICD-10-CM

## 2024-05-13 DIAGNOSIS — E11.40 TYPE 2 DIABETES MELLITUS WITH DIABETIC NEUROPATHY, WITHOUT LONG-TERM CURRENT USE OF INSULIN: ICD-10-CM

## 2024-05-13 DIAGNOSIS — Q66.89 CLAW TOE: ICD-10-CM

## 2024-05-13 DIAGNOSIS — L84 FOOT CALLUS: ICD-10-CM

## 2024-05-13 DIAGNOSIS — N18.32 STAGE 3B CHRONIC KIDNEY DISEASE: ICD-10-CM

## 2024-05-13 PROCEDURE — 11055 PARING/CUTG B9 HYPRKER LES 1: CPT | Performed by: NURSE PRACTITIONER

## 2024-05-13 PROCEDURE — 11721 DEBRIDE NAIL 6 OR MORE: CPT | Performed by: NURSE PRACTITIONER

## 2024-05-13 PROCEDURE — 1159F MED LIST DOCD IN RCRD: CPT | Performed by: NURSE PRACTITIONER

## 2024-05-13 PROCEDURE — 3074F SYST BP LT 130 MM HG: CPT | Performed by: NURSE PRACTITIONER

## 2024-05-13 PROCEDURE — 3078F DIAST BP <80 MM HG: CPT | Performed by: NURSE PRACTITIONER

## 2024-05-13 PROCEDURE — 1160F RVW MEDS BY RX/DR IN RCRD: CPT | Performed by: NURSE PRACTITIONER

## 2024-05-14 ENCOUNTER — TELEPHONE (OUTPATIENT)
Dept: CARDIOLOGY CLINIC | Age: 79
End: 2024-05-14

## 2024-05-14 NOTE — TELEPHONE ENCOUNTER
Called to reschedule appt to a different provider due to provider being on FMLA  patient has Lexiscan appt for 5/30/24 and will need a follow up   lvm

## 2024-05-16 ENCOUNTER — TELEPHONE (OUTPATIENT)
Dept: CARDIOLOGY CLINIC | Age: 79
End: 2024-05-16

## 2024-05-16 DIAGNOSIS — I48.0 PAF (PAROXYSMAL ATRIAL FIBRILLATION) (HCC): ICD-10-CM

## 2024-05-16 DIAGNOSIS — I49.5 SICK SINUS SYNDROME (HCC): ICD-10-CM

## 2024-05-16 DIAGNOSIS — Z95.0 PACEMAKER: Primary | ICD-10-CM

## 2024-05-16 DIAGNOSIS — Z95.0 PACEMAKER: ICD-10-CM

## 2024-05-16 RX ORDER — PROPAFENONE HYDROCHLORIDE 225 MG/1
225 TABLET, FILM COATED ORAL EVERY 8 HOURS
Qty: 90 TABLET | Refills: 3 | Status: SHIPPED | OUTPATIENT
Start: 2024-05-16

## 2024-05-16 NOTE — TELEPHONE ENCOUNTER
----- Message from LESA Medina sent at 5/16/2024  3:44 PM CDT -----  Isela let's also increase his Rythmol (Propafenone) to 225mg 3 times daily, EKG in the office in 7 to 10 days.  I sent the increased dose of med  to [facility]

## 2024-05-16 NOTE — TELEPHONE ENCOUNTER
I have left a message for patient to return call regarding recommendations from LESA Tovar.  I will be out of the office 5/16/24 if one of the medical staff can review recommendations with patient.

## 2024-05-23 NOTE — TELEPHONE ENCOUNTER
Late note:  spoke to patient on 5/23/24.  He is agreeable to increase his rythmol.  Scheduled him for an EKG next week.

## 2024-05-30 ENCOUNTER — OFFICE VISIT (OUTPATIENT)
Dept: CARDIOLOGY CLINIC | Age: 79
End: 2024-05-30

## 2024-05-30 ENCOUNTER — HOSPITAL ENCOUNTER (OUTPATIENT)
Age: 79
Discharge: HOME | End: 2024-05-30
Payer: MEDICARE

## 2024-05-30 ENCOUNTER — HOSPITAL ENCOUNTER (OUTPATIENT)
Dept: NUCLEAR MEDICINE | Age: 79
End: 2024-05-30
Payer: MEDICARE

## 2024-05-30 VITALS
HEIGHT: 74 IN | BODY MASS INDEX: 26.69 KG/M2 | SYSTOLIC BLOOD PRESSURE: 142 MMHG | DIASTOLIC BLOOD PRESSURE: 72 MMHG | WEIGHT: 208 LBS

## 2024-05-30 DIAGNOSIS — I48.0 PAF (PAROXYSMAL ATRIAL FIBRILLATION) (HCC): Primary | ICD-10-CM

## 2024-05-30 DIAGNOSIS — R06.09 DOE (DYSPNEA ON EXERTION): ICD-10-CM

## 2024-05-30 LAB
ECHO AO ASC DIAM: 3.6 CM
ECHO AO ASCENDING AORTA INDEX: 1.63 CM/M2
ECHO AO ROOT DIAM: 2.4 CM
ECHO AO ROOT INDEX: 1.09 CM/M2
ECHO AO SINUS VALSALVA DIAM: 3.3 CM
ECHO AO SINUS VALSALVA INDEX: 1.49 CM/M2
ECHO AO ST JNCT DIAM: 2.8 CM
ECHO AV AREA PEAK VELOCITY: 2.2 CM2
ECHO AV AREA VTI: 2 CM2
ECHO AV AREA/BSA PEAK VELOCITY: 1 CM2/M2
ECHO AV AREA/BSA VTI: 0.9 CM2/M2
ECHO AV MEAN GRADIENT: 4 MMHG
ECHO AV MEAN VELOCITY: 1 M/S
ECHO AV PEAK GRADIENT: 7 MMHG
ECHO AV PEAK VELOCITY: 1.4 M/S
ECHO AV VELOCITY RATIO: 0.57
ECHO AV VTI: 34.7 CM
ECHO BSA: 2.22 M2
ECHO EST RA PRESSURE: 3 MMHG
ECHO IVC PROX: 1.6 CM
ECHO LA AREA 2C: 21 CM2
ECHO LA AREA 4C: 20.1 CM2
ECHO LA DIAMETER INDEX: 2.08 CM/M2
ECHO LA DIAMETER: 4.6 CM
ECHO LA MAJOR AXIS: 6.2 CM
ECHO LA MINOR AXIS: 6.3 CM
ECHO LA TO AORTIC ROOT RATIO: 1.92
ECHO LA VOL BP: 56 ML (ref 18–58)
ECHO LA VOL MOD A2C: 60 ML (ref 18–58)
ECHO LA VOL MOD A4C: 55 ML (ref 18–58)
ECHO LA VOL/BSA BIPLANE: 25 ML/M2 (ref 16–34)
ECHO LA VOLUME INDEX MOD A2C: 27 ML/M2 (ref 16–34)
ECHO LA VOLUME INDEX MOD A4C: 25 ML/M2 (ref 16–34)
ECHO LV E' LATERAL VELOCITY: 10 CM/S
ECHO LV E' SEPTAL VELOCITY: 8 CM/S
ECHO LV EDV A2C: 144 ML
ECHO LV EDV A4C: 172 ML
ECHO LV EDV INDEX A4C: 78 ML/M2
ECHO LV EDV NDEX A2C: 65 ML/M2
ECHO LV EJECTION FRACTION A2C: 58 %
ECHO LV EJECTION FRACTION A4C: 58 %
ECHO LV EJECTION FRACTION BIPLANE: 58 % (ref 55–100)
ECHO LV ESV A2C: 60 ML
ECHO LV ESV A4C: 73 ML
ECHO LV ESV INDEX A2C: 27 ML/M2
ECHO LV ESV INDEX A4C: 33 ML/M2
ECHO LV FRACTIONAL SHORTENING: 24 % (ref 28–44)
ECHO LV INTERNAL DIMENSION DIASTOLE INDEX: 2.31 CM/M2
ECHO LV INTERNAL DIMENSION DIASTOLIC: 5.1 CM (ref 4.2–5.9)
ECHO LV INTERNAL DIMENSION SYSTOLIC INDEX: 1.76 CM/M2
ECHO LV INTERNAL DIMENSION SYSTOLIC: 3.9 CM
ECHO LV ISOVOLUMETRIC RELAXATION TIME (IVRT): 148 MS
ECHO LV IVSD: 1.3 CM (ref 0.6–1)
ECHO LV MASS 2D: 270.1 G (ref 88–224)
ECHO LV MASS INDEX 2D: 122.2 G/M2 (ref 49–115)
ECHO LV POSTERIOR WALL DIASTOLIC: 1.3 CM (ref 0.6–1)
ECHO LV RELATIVE WALL THICKNESS RATIO: 0.51
ECHO LVOT AREA: 3.8 CM2
ECHO LVOT AV VTI INDEX: 0.53
ECHO LVOT DIAM: 2.2 CM
ECHO LVOT MEAN GRADIENT: 1 MMHG
ECHO LVOT PEAK GRADIENT: 2 MMHG
ECHO LVOT PEAK VELOCITY: 0.8 M/S
ECHO LVOT STROKE VOLUME INDEX: 31.5 ML/M2
ECHO LVOT SV: 69.5 ML
ECHO LVOT VTI: 18.3 CM
ECHO MV A VELOCITY: 0.8 M/S
ECHO MV ANNULUS DIAMETER: 2.7 CM
ECHO MV AREA VTI: 3.3 CM2
ECHO MV E DECELERATION TIME (DT): 294 MS
ECHO MV E VELOCITY: 0.7 M/S
ECHO MV E/A RATIO: 0.88
ECHO MV E/E' LATERAL: 7
ECHO MV E/E' RATIO (AVERAGED): 7.88
ECHO MV E/E' SEPTAL: 8.75
ECHO MV LVOT VTI INDEX: 1.16
ECHO MV MAX VELOCITY: 0.7 M/S
ECHO MV MEAN GRADIENT: 2 MMHG
ECHO MV MEAN VELOCITY: 0.5 M/S
ECHO MV PEAK GRADIENT: 2 MMHG
ECHO MV VTI: 21.2 CM
ECHO RA AREA 4C: 11 CM2
ECHO RA END SYSTOLIC VOLUME APICAL 4 CHAMBER INDEX BSA: 10 ML/M2
ECHO RA MAJOR AXIS INDEX: 2.35 CM/M2
ECHO RA MAJOR AXIS: 5.2 CM
ECHO RA MINOR AXIS INDEX: 1.45 CM/M2
ECHO RA MINOR AXIS: 3.2 CM
ECHO RA VOLUME: 21 ML
ECHO RIGHT VENTRICULAR SYSTOLIC PRESSURE (RVSP): 27 MMHG
ECHO RV BASAL DIMENSION: 2.8 CM
ECHO RV INTERNAL DIMENSION: 3.8 CM
ECHO RV LONGITUDINAL DIMENSION: 9 CM
ECHO RV MID DIMENSION: 2.8 CM
ECHO RV TAPSE: 2 CM (ref 1.7–?)
ECHO TV REGURGITANT MAX VELOCITY: 2.47 M/S
ECHO TV REGURGITANT PEAK GRADIENT: 24 MMHG

## 2024-05-30 PROCEDURE — C8929 TTE W OR WO FOL WCON,DOPPLER: HCPCS

## 2024-05-30 PROCEDURE — 6360000004 HC RX CONTRAST MEDICATION: Performed by: CLINICAL NURSE SPECIALIST

## 2024-05-30 RX ORDER — PROPAFENONE HYDROCHLORIDE 225 MG/1
225 TABLET, FILM COATED ORAL EVERY 8 HOURS
Qty: 270 TABLET | Refills: 3 | Status: SHIPPED | OUTPATIENT
Start: 2024-05-30

## 2024-05-30 RX ADMIN — PERFLUTREN 1.5 ML: 6.52 INJECTION, SUSPENSION INTRAVENOUS at 08:33

## 2024-05-30 NOTE — PROGRESS NOTES
EKG shows sinus rhythm with first-degree AV block and right bundle branch block rate 78,    PAF burden  has dropped significantly and patient is feeling well

## 2024-06-04 ENCOUNTER — TELEPHONE (OUTPATIENT)
Dept: CARDIOLOGY CLINIC | Age: 79
End: 2024-06-04

## 2024-06-04 NOTE — TELEPHONE ENCOUNTER
----- Message from LESA Medina sent at 6/4/2024  8:20 AM CDT -----  Please let patient know his recent echo shows a normal ejection fraction, heart strength.  No significant valve issues.  Patient also had a stress test

## 2024-06-04 NOTE — TELEPHONE ENCOUNTER
----- Message from LESA Medina sent at 6/4/2024  8:19 AM CDT -----  Reviewing for Gissell.  Please let patient know recent Casandra scan showed no evidence of any blockage.  Patient also had an echo

## 2024-06-14 ENCOUNTER — OFFICE VISIT (OUTPATIENT)
Dept: CARDIOLOGY | Facility: CLINIC | Age: 79
End: 2024-06-14
Payer: MEDICARE

## 2024-06-14 VITALS
HEIGHT: 74 IN | OXYGEN SATURATION: 99 % | SYSTOLIC BLOOD PRESSURE: 110 MMHG | HEART RATE: 81 BPM | DIASTOLIC BLOOD PRESSURE: 70 MMHG | BODY MASS INDEX: 27.34 KG/M2 | WEIGHT: 213 LBS

## 2024-06-14 DIAGNOSIS — R06.09 DYSPNEA ON EXERTION: ICD-10-CM

## 2024-06-14 DIAGNOSIS — Z79.01 CHRONIC ANTICOAGULATION: ICD-10-CM

## 2024-06-14 DIAGNOSIS — I10 BENIGN ESSENTIAL HYPERTENSION: ICD-10-CM

## 2024-06-14 DIAGNOSIS — I25.10 NONOBSTRUCTIVE ATHEROSCLEROSIS OF CORONARY ARTERY: ICD-10-CM

## 2024-06-14 DIAGNOSIS — Z95.0 PACEMAKER: ICD-10-CM

## 2024-06-14 DIAGNOSIS — I48.0 PAROXYSMAL ATRIAL FIBRILLATION: Primary | ICD-10-CM

## 2024-06-14 DIAGNOSIS — Z01.818 PREOPERATIVE EVALUATION TO RULE OUT SURGICAL CONTRAINDICATION: ICD-10-CM

## 2024-06-14 NOTE — LETTER
June 14, 2024     Colten Otto MD  [address]  [place] [address]    Patient: Jero Higgins   YOB: 1945   Date of Visit: 6/14/2024       Dear Colten Otto MD    Jero Higgins was in my office today. Below is a copy of my note.    If you have questions, please do not hesitate to call me. I look forward to following Jero along with you.         Sincerely,        Mike Higgins MD        CC: No Recipients      Reason for Visit: cardiovascular follow up.    HPI:  Jero Higgins is a 78 y.o. male is here today for follow-up.  He was seen in cardiology consult on 4/30/2024 for assistance with management of atrial fibrillation.  He reported worsening dyspnea on exertion during visit.  Nuclear stress and echo were already scheduled at [facility].  These were both completed in May and were unremarkable.  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  He reports that propafenone was increased to 225 mg and feels like this has helped.  He still gets short of breath and will have to stop and rest.  He tries to get some exercise at times.  Blood pressure has been well controlled at home.  PFT's from December show a moderate obstructive ventilatory defect.      Previous Cardiac Testing and Procedures:  -Pacemaker placement (2012) for sick sinus syndrome  -[facility] (12/6/2012) 55% ramus, 30% RCA, otherwise luminal irregularities  -[facility] (7/16/2018) nonobstructive coronary atherosclerosis  -Pacemaker generator change (3/25/2021) [employer]  -Dobutamine stress echo (2/28/2023) no clinical, electrocardiographic, or echocardiographic evidence of ischemia  -Echo (2/28/2023) EF 55-60%, impaired relaxation, mild RV enlargement, normal LA and RA, mild TR  -PFTs (12/2023) moderate obstructive ventilatory defect, slight decrease in inspiratory capacity, mild diffusion impairment that is normal when corrected for alveolar volume  -Chest x-ray (4/13/2024) no consolidation,  effusion, or pneumothorax, cardiomediastinal silhouette and pulmonary vessels are within normal limits, pacemaker in place  -Nuclear stress (2024) fixed basal to mid inferior defect consistent with diaphragmatic attenuation, no evidence of ischemia, EF 43%  -Echo (2024) EF 55-60%, normal diastolic function for age, normal RV size and function, pacemaker lead present, normal LA size, normal aortic root, no significant valve dysfunction     Lab data:  -BMP (3/20/2024) creatinine 1.49, GFR 47, potassium 4.3, sodium 140  -CBC (3/20/2024) WBC 8.9, hemoglobin 12.5, platelets 260  -proBNP (2024) 935, normal 0-450    Patient Active Problem List   Diagnosis   • Alternating constipation and diarrhea   • Benign essential hypertension   • Bilateral impacted cerumen   • Cholelithiasis without obstruction   • Nonobstructive atherosclerosis of coronary artery   • Gastroesophageal reflux disease without esophagitis   • Personal history of kidney cancer   • Hyperglycemia due to type 2 diabetes mellitus   • Meniere's disease of both ears   • Stage 3b chronic kidney disease   • Sick sinus syndrome   • Renal cell carcinoma   • Pacemaker   • Vertigo   • Paroxysmal atrial fibrillation   • Chronic anticoagulation       Social History     Tobacco Use   • Smoking status: Former     Current packs/day: 0.00     Types: Cigarettes     Quit date:      Years since quittin.4   • Smokeless tobacco: Never   Vaping Use   • Vaping status: Never Used   Substance Use Topics   • Alcohol use: Yes     Comment: occ   • Drug use: Never       History reviewed. No pertinent family history.    The following portions of the patient's history were reviewed and updated as appropriate: allergies, current medications, past family history, past medical history, past social history, past surgical history, and problem list.      Current Outpatient Medications:   •  amLODIPine (NORVASC) 5 MG tablet, Take 1 tablet by mouth Daily., Disp: , Rfl:   •  " Eliquis 5 MG tablet tablet, , Disp: , Rfl:   •  famotidine (PEPCID) 20 MG tablet, Take 1 tablet by mouth 2 (Two) Times a Day., Disp: , Rfl:   •  losartan (COZAAR) 50 MG tablet, Take 2 tablets by mouth Daily., Disp: , Rfl:   •  metoprolol tartrate (LOPRESSOR) 25 MG tablet, Take 1 tablet by mouth 2 (Two) Times a Day., Disp: , Rfl:   •  propafenone (RYTHMOL) 225 MG tablet, Take 1 tablet by mouth Every 8 (Eight) Hours., Disp: , Rfl:   •  Semaglutide, 1 MG/DOSE, (OZEMPIC) 4 MG/3ML solution pen-injector, Inject  under the skin into the appropriate area as directed 1 (One) Time Per Week., Disp: , Rfl:     Review of Systems   Constitutional: Negative for chills and fever.   Cardiovascular:  Positive for dyspnea on exertion. Negative for chest pain and paroxysmal nocturnal dyspnea.   Respiratory:  Positive for shortness of breath. Negative for cough.    Skin:  Negative for rash.   Musculoskeletal:  Positive for muscle weakness.   Gastrointestinal:  Negative for abdominal pain and heartburn.   Neurological:  Negative for dizziness and numbness.       Objective  /70 (BP Location: Right arm, Patient Position: Sitting, Cuff Size: Adult)   Pulse 81   Ht 188 cm (74.02\")   Wt 96.6 kg (213 lb)   SpO2 99%   BMI 27.34 kg/m²   Constitutional:       Appearance: Well-developed.   HENT:      Head: Normocephalic and atraumatic.   Pulmonary:      Effort: Pulmonary effort is normal.      Breath sounds: Normal breath sounds.   Cardiovascular:      Normal rate. Regular rhythm.   Edema:     Peripheral edema absent.   Skin:     General: Skin is warm and dry.   Neurological:      Mental Status: Alert and oriented to person, place, and time.       Procedures      ICD-10-CM ICD-9-CM   1. Paroxysmal atrial fibrillation  I48.0 427.31   2. Pacemaker  Z95.0 V45.01   3. Dyspnea on exertion  R06.09 786.09   4. Nonobstructive atherosclerosis of coronary artery  I25.10 414.00   5. Benign essential hypertension  I10 401.1   6. Chronic " anticoagulation  Z79.01 V58.61   7. Preoperative evaluation to rule out surgical contraindication  Z01.818 V72.83         Assessment/Plan:  1.  Paroxysmal atrial fibrillation: [place] of 3.4% on pacemaker interrogation in April.  No significant palpitations.  Continue metoprolol, propafenone, and Eliquis.     2.  Cardiac pacemaker: Interrogate pacemaker in office today and adjust atrial sensitivity.     3.  Dyspnea on exertion: Felt to be multifactorial.  PFTs from 12/2023 showed moderate obstructive ventilatory defect.  No evidence of any significant cardiac abnormalities that would contribute.  Nuclear stress and echo from 5/30/2024 both unremarkable.  Deconditioning could also be contributing.     4.  Nonobstructive coronary artery disease: Noted on previous heart cath from 2012 and 2018.  Low risk stress echo on 2/28/2023 and no evidence of ischemia on nuclear stress from 5/30/2024.     5.  Essential hypertension: Blood pressure is within normal limits today.  Continue amlodipine, losartan, and Toprol.     6.  Chronic anticoagulation: Continue Eliquis.    7.  Preoperative evaluation: Patient was diagnosed with a pancreatic cyst, which may require surgery, and is seeing a specialist.  Nuclear stress on 5/30/2024 showed no evidence of ischemia.  Structurally normal heart on echo from 5/30/2024.  No further cardiac testing is indicated prior to surgery.  Acceptable to hold Eliquis for 48 to 72 hours prior to surgery and resume as soon as safe afterwards.

## 2024-06-14 NOTE — PROGRESS NOTES
Reason for Visit: cardiovascular follow up.    HPI:  Jero Higgins is a 78 y.o. male is here today for follow-up.  He was seen in cardiology consult on 4/30/2024 for assistance with management of atrial fibrillation.  He reported worsening dyspnea on exertion during visit.  Nuclear stress and echo were already scheduled at [facility].  These were both completed in May and were unremarkable.  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  He reports that propafenone was increased to 225 mg and feels like this has helped.  He still gets short of breath and will have to stop and rest.  He tries to get some exercise at times.  Blood pressure has been well controlled at home.  PFT's from December show a moderate obstructive ventilatory defect.      Previous Cardiac Testing and Procedures:  -Pacemaker placement (2012) for sick sinus syndrome  -[facility] (12/6/2012) 55% ramus, 30% RCA, otherwise luminal irregularities  -[facility] (7/16/2018) nonobstructive coronary atherosclerosis  -Pacemaker generator change (3/25/2021) [employer]  -Dobutamine stress echo (2/28/2023) no clinical, electrocardiographic, or echocardiographic evidence of ischemia  -Echo (2/28/2023) EF 55-60%, impaired relaxation, mild RV enlargement, normal LA and RA, mild TR  -PFTs (12/2023) moderate obstructive ventilatory defect, slight decrease in inspiratory capacity, mild diffusion impairment that is normal when corrected for alveolar volume  -Chest x-ray (4/13/2024) no consolidation, effusion, or pneumothorax, cardiomediastinal silhouette and pulmonary vessels are within normal limits, pacemaker in place  -Nuclear stress (5/30/2024) fixed basal to mid inferior defect consistent with diaphragmatic attenuation, no evidence of ischemia, EF 43%  -Echo (5/30/2024) EF 55-60%, normal diastolic function for age, normal RV size and function, pacemaker lead present, normal LA size, normal aortic root, no significant valve  dysfunction     Lab data:  -BMP (3/20/2024) creatinine 1.49, GFR 47, potassium 4.3, sodium 140  -CBC (3/20/2024) WBC 8.9, hemoglobin 12.5, platelets 260  -proBNP (2024) 935, normal 0-450    Patient Active Problem List   Diagnosis    Alternating constipation and diarrhea    Benign essential hypertension    Bilateral impacted cerumen    Cholelithiasis without obstruction    Nonobstructive atherosclerosis of coronary artery    Gastroesophageal reflux disease without esophagitis    Personal history of kidney cancer    Hyperglycemia due to type 2 diabetes mellitus    Meniere's disease of both ears    Stage 3b chronic kidney disease    Sick sinus syndrome    Renal cell carcinoma    Pacemaker    Vertigo    Paroxysmal atrial fibrillation    Chronic anticoagulation       Social History     Tobacco Use    Smoking status: Former     Current packs/day: 0.00     Types: Cigarettes     Quit date:      Years since quittin.4    Smokeless tobacco: Never   Vaping Use    Vaping status: Never Used   Substance Use Topics    Alcohol use: Yes     Comment: occ    Drug use: Never       History reviewed. No pertinent family history.    The following portions of the patient's history were reviewed and updated as appropriate: allergies, current medications, past family history, past medical history, past social history, past surgical history, and problem list.      Current Outpatient Medications:     amLODIPine (NORVASC) 5 MG tablet, Take 1 tablet by mouth Daily., Disp: , Rfl:     Eliquis 5 MG tablet tablet, , Disp: , Rfl:     famotidine (PEPCID) 20 MG tablet, Take 1 tablet by mouth 2 (Two) Times a Day., Disp: , Rfl:     losartan (COZAAR) 50 MG tablet, Take 2 tablets by mouth Daily., Disp: , Rfl:     metoprolol tartrate (LOPRESSOR) 25 MG tablet, Take 1 tablet by mouth 2 (Two) Times a Day., Disp: , Rfl:     propafenone (RYTHMOL) 225 MG tablet, Take 1 tablet by mouth Every 8 (Eight) Hours., Disp: , Rfl:     Semaglutide, 1 MG/DOSE,  "(OZEMPIC) 4 MG/3ML solution pen-injector, Inject  under the skin into the appropriate area as directed 1 (One) Time Per Week., Disp: , Rfl:     Review of Systems   Constitutional: Negative for chills and fever.   Cardiovascular:  Positive for dyspnea on exertion. Negative for chest pain and paroxysmal nocturnal dyspnea.   Respiratory:  Positive for shortness of breath. Negative for cough.    Skin:  Negative for rash.   Musculoskeletal:  Positive for muscle weakness.   Gastrointestinal:  Negative for abdominal pain and heartburn.   Neurological:  Negative for dizziness and numbness.       Objective   /70 (BP Location: Right arm, Patient Position: Sitting, Cuff Size: Adult)   Pulse 81   Ht 188 cm (74.02\")   Wt 96.6 kg (213 lb)   SpO2 99%   BMI 27.34 kg/m²   Constitutional:       Appearance: Well-developed.   HENT:      Head: Normocephalic and atraumatic.   Pulmonary:      Effort: Pulmonary effort is normal.      Breath sounds: Normal breath sounds.   Cardiovascular:      Normal rate. Regular rhythm.   Edema:     Peripheral edema absent.   Skin:     General: Skin is warm and dry.   Neurological:      Mental Status: Alert and oriented to person, place, and time.       Procedures      ICD-10-CM ICD-9-CM   1. Paroxysmal atrial fibrillation  I48.0 427.31   2. Pacemaker  Z95.0 V45.01   3. Dyspnea on exertion  R06.09 786.09   4. Nonobstructive atherosclerosis of coronary artery  I25.10 414.00   5. Benign essential hypertension  I10 401.1   6. Chronic anticoagulation  Z79.01 V58.61   7. Preoperative evaluation to rule out surgical contraindication  Z01.818 V72.83         Assessment/Plan:  1.  Paroxysmal atrial fibrillation: [place] of 3.4% on pacemaker interrogation in April.  No significant palpitations.  Continue metoprolol, propafenone, and Eliquis.     2.  Cardiac pacemaker: Interrogate pacemaker in office today and adjust atrial sensitivity.     3.  Dyspnea on exertion: Felt to be multifactorial.  PFTs from " 12/2023 showed moderate obstructive ventilatory defect.  No evidence of any significant cardiac abnormalities that would contribute.  Nuclear stress and echo from 5/30/2024 both unremarkable.  Deconditioning could also be contributing.     4.  Nonobstructive coronary artery disease: Noted on previous heart cath from 2012 and 2018.  Low risk stress echo on 2/28/2023 and no evidence of ischemia on nuclear stress from 5/30/2024.     5.  Essential hypertension: Blood pressure is within normal limits today.  Continue amlodipine, losartan, and Toprol.     6.  Chronic anticoagulation: Continue Eliquis.    7.  Preoperative evaluation: Patient was diagnosed with a pancreatic cyst, which may require surgery, and is seeing a specialist.  Nuclear stress on 5/30/2024 showed no evidence of ischemia.  Structurally normal heart on echo from 5/30/2024.  No further cardiac testing is indicated prior to surgery.  Acceptable to hold Eliquis for 48 to 72 hours prior to surgery and resume as soon as safe afterwards.

## 2024-06-20 ENCOUNTER — TRANSCRIBE ORDERS (OUTPATIENT)
Dept: ADMINISTRATIVE | Facility: HOSPITAL | Age: 79
End: 2024-06-20
Payer: MEDICARE

## 2024-06-20 DIAGNOSIS — K86.89 OTHER SPECIFIED DISEASES OF PANCREAS: Primary | ICD-10-CM

## 2024-07-11 ENCOUNTER — HOSPITAL ENCOUNTER (OUTPATIENT)
Dept: MRI IMAGING | Facility: HOSPITAL | Age: 79
Discharge: HOME | End: 2024-07-11
Admitting: FAMILY MEDICINE
Payer: MEDICARE

## 2024-07-11 DIAGNOSIS — K86.89 OTHER SPECIFIED DISEASES OF PANCREAS: ICD-10-CM

## 2024-07-11 PROCEDURE — 0 GADOBENATE DIMEGLUMINE 529 MG/ML SOLUTION: Performed by: FAMILY MEDICINE

## 2024-07-11 PROCEDURE — 82565 ASSAY OF CREATININE: CPT

## 2024-07-11 PROCEDURE — A9577 INJ MULTIHANCE: HCPCS | Performed by: FAMILY MEDICINE

## 2024-07-11 PROCEDURE — 74183 MRI ABD W/O CNTR FLWD CNTR: CPT

## 2024-07-11 RX ADMIN — GADOBENATE DIMEGLUMINE 20 ML: 529 INJECTION, SOLUTION INTRAVENOUS at 12:05

## 2024-07-12 LAB — CREAT BLDA-MCNC: 1.4 MG/DL (ref 0.6–1.3)

## 2024-08-09 NOTE — PROGRESS NOTES
[facility] - PODIATRY    Today's Date: 2024     Patient Name: Jero Higgins  MRN: [id number]  CSN: [id number]  PCP: Colten Otto MD  Referring Provider: No ref. provider found    SUBJECTIVE     Chief Complaint   Patient presents with    Follow-up     Colten Otto MD 2024 3 mth f/u for diabetic foot care clinic  Pt states he is just here for his nail trim 0/10 pain    Diabetes     120 Mg/dL Bg     HPI: Jero Higgins, a 78 y.o.male, comes to clinic as a(n) established patient presenting for diabetic foot exam and complaining of toenail/callus issues. Patient has h/o cancer, CHF, DM2, HTN . Patient is NIDDM with last stated BG level of 120mg/dl. Relates only mild numbness in feet. Denies open wounds or sores. Notes that his toenails are long, thick and crumbly. He has trouble caring for his toenails himself. Patient has noticed callused area to left 2nd toe.   Denies pain. Denies previous treatment. Denies any constitutional symptoms. No other pedal complaints at this time.    Past Medical History:   Diagnosis Date    Cancer     Cancer of kidney     Congestive cardiac failure     Diabetes mellitus     Hypertension      Past Surgical History:   Procedure Laterality Date    APPENDECTOMY      PACEMAKER IMPLANTATION       History reviewed. No pertinent family history.  Social History     Socioeconomic History    Marital status:    Tobacco Use    Smoking status: Former     Current packs/day: 0.00     Types: Cigarettes     Quit date:      Years since quittin.6    Smokeless tobacco: Never   Vaping Use    Vaping status: Never Used   Substance and Sexual Activity    Alcohol use: Yes     Comment: occ    Drug use: Never    Sexual activity: Defer     Allergies   Allergen Reactions    Lisinopril Cough    Metformin And Related Nausea And Vomiting     Current Outpatient Medications   Medication Sig Dispense Refill    amLODIPine (NORVASC) 5 MG tablet Take 1 tablet by mouth  Daily.      Eliquis 5 MG tablet tablet       famotidine (PEPCID) 20 MG tablet Take 1 tablet by mouth 2 (Two) Times a Day.      losartan (COZAAR) 50 MG tablet Take 2 tablets by mouth Daily.      metoprolol tartrate (LOPRESSOR) 25 MG tablet Take 1 tablet by mouth 2 (Two) Times a Day.      propafenone (RYTHMOL) 225 MG tablet Take 1 tablet by mouth Every 8 (Eight) Hours.      Semaglutide, 1 MG/DOSE, (OZEMPIC) 4 MG/3ML solution pen-injector Inject  under the skin into the appropriate area as directed 1 (One) Time Per Week.       No current facility-administered medications for this visit.     Review of Systems   Constitutional:  Negative for chills and fever.   HENT:  Negative for congestion.    Respiratory:  Negative for shortness of breath.    Cardiovascular:  Negative for chest pain and leg swelling.   Gastrointestinal:  Negative for constipation, diarrhea, nausea and vomiting.   Musculoskeletal:  Positive for arthralgias.   Skin:  Negative for wound.   Neurological:  Negative for numbness.   Psychiatric/Behavioral:  Negative for agitation.        OBJECTIVE     Vitals:    08/14/24 1310   BP: 130/82   Pulse: 75   Resp: 19   SpO2: 96%       PHYSICAL EXAM  GEN:   Accompanied by none.     Foot/Ankle Exam    GENERAL  Diabetic foot exam performed    Appearance:  appears stated age  Orientation:  AAOx3  Affect:  appropriate  Gait:  unimpaired  Assistance:  independent  Right shoe gear: casual shoe  Left shoe gear: casual shoe    VASCULAR     Right Foot Vascularity   Dorsalis pedis:  2+  Posterior tibial:  2+  Skin temperature:  warm  Edema grading:  None  CFT:  3  Pedal hair growth:  Present  Varicosities:  none     Left Foot Vascularity   Dorsalis pedis:  2+  Posterior tibial:  2+  Skin temperature:  warm  Edema grading:  None  CFT:  3  Pedal hair growth:  Present  Varicosities:  none     NEUROLOGIC     Right Foot Neurologic   Light touch sensation: diminished  Vibratory sensation: diminished  Hot/Cold sensation:  diminished  Protective Sensation using [place] Monofilament:   Sites intact: 9  Sites tested: 10     Left Foot Neurologic   Light touch sensation: diminished  Vibratory sensation: diminished  Hot/Cold sensation:  diminished  Protective Sensation using [place] Monofilament:   Sites intact: 10  Sites tested: 10    MUSCULOSKELETAL     Right Foot Musculoskeletal   Ecchymosis:  none  Tenderness:  toenail problem    Arch:  Normal  Claw toe:  Second toe, third toe, fourth toe and fifth toe     Left Foot Musculoskeletal   Ecchymosis:  none  Tenderness:  toenail problem  Arch:  Normal  Claw toe:  Second toe, third toe, fourth toe and fifth toe    MUSCLE STRENGTH     Right Foot Muscle Strength   Foot dorsiflexion:  5  Foot plantar flexion:  5  Foot inversion:  5  Foot eversion:  5     Left Foot Muscle Strength   Foot dorsiflexion:  5  Foot plantar flexion:  5  Foot inversion:  5  Foot eversion:  5    RANGE OF MOTION     Right Foot Range of Motion   Foot and ankle ROM within normal limits       Left Foot Range of Motion   Foot and ankle ROM within normal limits      DERMATOLOGIC      Right Foot Dermatologic   Skin  Right foot skin is intact.   Nails  1.  Positive for elongated, onychomycosis, abnormal thickness, subungual debris and dystrophic nail.  2.  Positive for elongated, onychomycosis, abnormal thickness and subungual debris.  3.  Positive for elongated, onychomycosis, abnormal thickness and subungual debris.  4.  Positive for elongated, onychomycosis, abnormal thickness and subungual debris.  5.  Positive for elongated, onychomycosis, abnormal thickness and subungual debris.     Left Foot Dermatologic   Skin  Left foot skin is intact.   Nails  1.  Positive for elongated, onychomycosis, abnormal thickness, subungual debris and dystrophic nail.  2.  Positive for elongated, onychomycosis, abnormal thickness and subungual debris.  3.  Positive for elongated, onychomycosis, abnormal thickness and subungual  debris.  4.  Positive for elongated, onychomycosis, abnormally thick and subungual debris.  5.  Positive for elongated, onychomycosis, abnormally thick and subungual debris.    Image:       RADIOLOGY/NUCLEAR:  No results found.    LABORATORY/CULTURE RESULTS:      PATHOLOGY RESULTS:       [id number]/PLAN     Diagnoses and all orders for this visit:    1. Onychomycosis (Primary)    2. Stage 3b chronic kidney disease    3. Type 2 diabetes mellitus with diabetic neuropathy, without long-term current use of insulin    4. Foot callus [L84]    5. Claw toe    Comprehensive lower extremity examination and evaluation was performed.  Discussed findings and treatment plan including risks, benefits, and treatment options with patient in detail. Patient agreed with treatment plan.  After verbal consent obtained, nail(s) x10 debrided of length and thickness with nail nipper without incidence  After verbal consent obtained, calluses x1 pared utilizing dermal curette and/or scalpel without incidence  Patient may maintain nails and calluses at home utilizing emery board or pumice stone between visits as needed  Reviewed at home diabetic foot care including daily foot checks   Continue BG control with assistance of PCP.  An After Visit Summary was printed and given to the patient at discharge, including (if requested) any available informative/educational handouts regarding diagnosis, treatment, or medications. All questions were answered to patient/family satisfaction. Should symptoms fail to improve or worsen they agree to call or return to clinic or to go to the Emergency Department. Discussed the importance of following up with any needed screening tests/labs/specialist appointments and any requested follow-up recommended by me today. Importance of maintaining follow-up discussed and patient accepts that missed appointments can delay diagnosis and potentially lead to worsening of conditions.  Return in about 3 months (around  11/14/2024) for With Bety CARBALLO, Schedule Foot Care Clinic., or sooner if acute issues arise.        This document has been electronically signed by KAIT Gee on August 14, 2024 14:18 CDT

## 2024-08-14 ENCOUNTER — OFFICE VISIT (OUTPATIENT)
Age: 79
End: 2024-08-14
Payer: MEDICARE

## 2024-08-14 VITALS
HEIGHT: 74 IN | WEIGHT: 214 LBS | SYSTOLIC BLOOD PRESSURE: 130 MMHG | DIASTOLIC BLOOD PRESSURE: 82 MMHG | OXYGEN SATURATION: 96 % | HEART RATE: 75 BPM | RESPIRATION RATE: 19 BRPM | BODY MASS INDEX: 27.46 KG/M2

## 2024-08-14 DIAGNOSIS — Q66.89 CLAW TOE: ICD-10-CM

## 2024-08-14 DIAGNOSIS — B35.1 ONYCHOMYCOSIS: Primary | ICD-10-CM

## 2024-08-14 DIAGNOSIS — N18.32 STAGE 3B CHRONIC KIDNEY DISEASE: ICD-10-CM

## 2024-08-14 DIAGNOSIS — L84 FOOT CALLUS: ICD-10-CM

## 2024-08-14 DIAGNOSIS — E11.40 TYPE 2 DIABETES MELLITUS WITH DIABETIC NEUROPATHY, WITHOUT LONG-TERM CURRENT USE OF INSULIN: ICD-10-CM

## 2024-08-30 DIAGNOSIS — I10 ESSENTIAL HYPERTENSION: ICD-10-CM

## 2024-09-03 RX ORDER — METOPROLOL TARTRATE 25 MG/1
TABLET, FILM COATED ORAL
Qty: 90 TABLET | Refills: 3 | OUTPATIENT
Start: 2024-09-03

## 2024-09-03 RX ORDER — AMLODIPINE BESYLATE 5 MG/1
5 TABLET ORAL DAILY
Qty: 90 TABLET | Refills: 3 | OUTPATIENT
Start: 2024-09-03

## 2024-11-25 NOTE — PROGRESS NOTES
[facility] - PODIATRY    Today's Date: 2024     Patient Name: Jero Higgins  MRN: [id number]  CSN: [id number]  PCP: Colten Otto MD  Referring Provider: No ref. provider found    SUBJECTIVE     Chief Complaint   Patient presents with    Follow-up     Colten Otto MD 2024 3 mth f/u in diabetic foot care clinic- pt states feet doing ok- pt denies pain     Diabetes     113mg/dl BG this am      HPI: Jero Higgins, a 79 y.o.male, comes to clinic as a(n) established patient presenting for diabetic foot exam and complaining of toenail/callus issues. Patient has h/o cancer, CHF, DM2, HTN .  Patient presents with toenails that are long, thick, and crumbly.  Patient is NIDDM with last stated BG level of 113mg/dl. Relates only mild numbness in feet. Denies open wounds or sores. He has trouble caring for his toenails himself due to thickness.  Denies pain. Denies previous treatment. Denies any constitutional symptoms. No other pedal complaints at this time.    Past Medical History:   Diagnosis Date    Cancer     Cancer of kidney     Congestive cardiac failure     Diabetes mellitus     Hypertension      Past Surgical History:   Procedure Laterality Date    APPENDECTOMY      PACEMAKER IMPLANTATION       History reviewed. No pertinent family history.  Social History     Socioeconomic History    Marital status:    Tobacco Use    Smoking status: Former     Current packs/day: 0.00     Types: Cigarettes     Quit date:      Years since quittin.9    Smokeless tobacco: Never   Vaping Use    Vaping status: Never Used   Substance and Sexual Activity    Alcohol use: Yes     Comment: occ    Drug use: Never    Sexual activity: Defer     Allergies   Allergen Reactions    Lisinopril Cough    Metformin And Related Nausea And Vomiting     Current Outpatient Medications   Medication Sig Dispense Refill    amLODIPine (NORVASC) 5 MG tablet Take 1 tablet by mouth Daily.      Eliquis 5 MG  tablet tablet       famotidine (PEPCID) 20 MG tablet Take 1 tablet by mouth 2 (Two) Times a Day.      losartan (COZAAR) 50 MG tablet Take 2 tablets by mouth Daily.      metoprolol tartrate (LOPRESSOR) 25 MG tablet Take 1 tablet by mouth 2 (Two) Times a Day.      propafenone (RYTHMOL) 225 MG tablet Take 1 tablet by mouth Every 8 (Eight) Hours.      Semaglutide, 1 MG/DOSE, (OZEMPIC) 4 MG/3ML solution pen-injector Inject  under the skin into the appropriate area as directed 1 (One) Time Per Week.       No current facility-administered medications for this visit.     Review of Systems   Constitutional:  Negative for chills and fever.   HENT:  Negative for congestion.    Respiratory:  Negative for shortness of breath.    Cardiovascular:  Negative for chest pain and leg swelling.   Gastrointestinal:  Negative for constipation, diarrhea, nausea and vomiting.   Musculoskeletal:  Positive for arthralgias.   Skin:  Negative for wound.   Neurological:  Negative for numbness.   Psychiatric/Behavioral:  Negative for agitation.        OBJECTIVE     Vitals:    11/27/24 1340   BP: 136/74   Pulse: 60   SpO2: 97%       PHYSICAL EXAM  GEN:   Accompanied by none.     Foot/Ankle Exam    GENERAL  Diabetic foot exam performed    Appearance:  appears stated age  Orientation:  AAOx3  Affect:  appropriate  Gait:  unimpaired  Assistance:  independent  Right shoe gear: casual shoe  Left shoe gear: casual shoe    VASCULAR     Right Foot Vascularity   Dorsalis pedis:  2+  Posterior tibial:  2+  Skin temperature:  warm  Edema grading:  None  CFT:  3  Pedal hair growth:  Present  Varicosities:  none     Left Foot Vascularity   Dorsalis pedis:  2+  Posterior tibial:  2+  Skin temperature:  warm  Edema grading:  None  CFT:  3  Pedal hair growth:  Present  Varicosities:  none     NEUROLOGIC     Right Foot Neurologic   Light touch sensation: diminished  Vibratory sensation: diminished  Hot/Cold sensation: diminished  Protective Sensation using  [place] Monofilament:   Sites intact: 9  Sites tested: 10     Left Foot Neurologic   Light touch sensation: diminished  Vibratory sensation: diminished  Hot/Cold sensation:  diminished  Protective Sensation using [place] Monofilament:   Sites intact: 10  Sites tested: 10    MUSCULOSKELETAL     Right Foot Musculoskeletal   Ecchymosis:  none  Tenderness:  toenail problem    Arch:  Normal  Claw toe:  Second toe, third toe, fourth toe and fifth toe     Left Foot Musculoskeletal   Ecchymosis:  none  Tenderness:  toenail problem  Arch:  Normal  Claw toe:  Second toe, third toe, fourth toe and fifth toe    MUSCLE STRENGTH     Right Foot Muscle Strength   Foot dorsiflexion:  5  Foot plantar flexion:  5  Foot inversion:  5  Foot eversion:  5     Left Foot Muscle Strength   Foot dorsiflexion:  5  Foot plantar flexion:  5  Foot inversion:  5  Foot eversion:  5    RANGE OF MOTION     Right Foot Range of Motion   Foot and ankle ROM within normal limits       Left Foot Range of Motion   Foot and ankle ROM within normal limits      DERMATOLOGIC      Right Foot Dermatologic   Skin  Right foot skin is intact.   Nails  1.  Positive for elongated, onychomycosis, abnormal thickness, subungual debris and dystrophic nail.  2.  Positive for elongated, onychomycosis, abnormal thickness and subungual debris.  3.  Positive for elongated, onychomycosis, abnormal thickness and subungual debris.  4.  Positive for elongated, onychomycosis, abnormal thickness and subungual debris.  5.  Positive for elongated, onychomycosis, abnormal thickness and subungual debris.     Left Foot Dermatologic   Skin  Left foot skin is intact.   Nails  1.  Positive for elongated, onychomycosis, abnormal thickness, subungual debris and dystrophic nail.  2.  Positive for elongated, onychomycosis, abnormal thickness and subungual debris.  3.  Positive for elongated, onychomycosis, abnormal thickness and subungual debris.  4.  Positive for elongated,  onychomycosis, abnormally thick and subungual debris.  5.  Positive for elongated, onychomycosis, abnormally thick and subungual debris.    Image:       RADIOLOGY/NUCLEAR:  No results found.    LABORATORY/CULTURE RESULTS:      PATHOLOGY RESULTS:       [id number]/PLAN     Diagnoses and all orders for this visit:    1. Onychomycosis (Primary)    2. Type 2 diabetes mellitus with diabetic neuropathy, without long-term current use of insulin    3. Stage 3b chronic kidney disease    4. Foot callus [L84]    5. Claw toe      Comprehensive lower extremity examination and evaluation was performed.  Discussed findings and treatment plan including risks, benefits, and treatment options with patient in detail. Patient agreed with treatment plan.  After verbal consent obtained, nail(s) x10 debrided of length and thickness with nail nipper without incidence  After verbal consent obtained, calluses x1 pared utilizing dermal curette and/or scalpel without incidence  Patient may maintain nails and calluses at home utilizing emery board or pumice stone between visits as needed  Reviewed at home diabetic foot care including daily foot checks   Continue BG control with assistance of PCP.  An After Visit Summary was printed and given to the patient at discharge, including (if requested) any available informative/educational handouts regarding diagnosis, treatment, or medications. All questions were answered to patient/family satisfaction. Should symptoms fail to improve or worsen they agree to call or return to clinic or to go to the Emergency Department. Discussed the importance of following up with any needed screening tests/labs/specialist appointments and any requested follow-up recommended by me today. Importance of maintaining follow-up discussed and patient accepts that missed appointments can delay diagnosis and potentially lead to worsening of conditions.  Return in about 3 months (around 2/27/2025) for With Bety CARBALLO,  Diabetic Foot Exam, Schedule Foot Care Clinic., or sooner if acute issues arise.        This document has been electronically signed by KAIT Gee on November 27, 2024 15:03 CST

## 2024-11-27 ENCOUNTER — OFFICE VISIT (OUTPATIENT)
Age: 79
End: 2024-11-27
Payer: MEDICARE

## 2024-11-27 VITALS
OXYGEN SATURATION: 97 % | SYSTOLIC BLOOD PRESSURE: 136 MMHG | HEIGHT: 74 IN | HEART RATE: 60 BPM | BODY MASS INDEX: 27.72 KG/M2 | DIASTOLIC BLOOD PRESSURE: 74 MMHG | WEIGHT: 216 LBS

## 2024-11-27 DIAGNOSIS — L84 FOOT CALLUS: ICD-10-CM

## 2024-11-27 DIAGNOSIS — B35.1 ONYCHOMYCOSIS: Primary | ICD-10-CM

## 2024-11-27 DIAGNOSIS — E11.40 TYPE 2 DIABETES MELLITUS WITH DIABETIC NEUROPATHY, WITHOUT LONG-TERM CURRENT USE OF INSULIN: ICD-10-CM

## 2024-11-27 DIAGNOSIS — Q66.89 CLAW TOE: ICD-10-CM

## 2024-11-27 DIAGNOSIS — N18.32 STAGE 3B CHRONIC KIDNEY DISEASE: ICD-10-CM

## 2024-12-16 ENCOUNTER — OFFICE VISIT (OUTPATIENT)
Dept: CARDIOLOGY | Facility: CLINIC | Age: 79
End: 2024-12-16
Payer: MEDICARE

## 2024-12-16 VITALS
SYSTOLIC BLOOD PRESSURE: 104 MMHG | HEART RATE: 68 BPM | WEIGHT: 219 LBS | OXYGEN SATURATION: 98 % | DIASTOLIC BLOOD PRESSURE: 56 MMHG | BODY MASS INDEX: 28.11 KG/M2 | HEIGHT: 74 IN

## 2024-12-16 DIAGNOSIS — I48.0 PAROXYSMAL ATRIAL FIBRILLATION: Primary | ICD-10-CM

## 2024-12-16 DIAGNOSIS — Z79.01 CHRONIC ANTICOAGULATION: ICD-10-CM

## 2024-12-16 DIAGNOSIS — Z95.0 PACEMAKER: ICD-10-CM

## 2024-12-16 DIAGNOSIS — I10 BENIGN ESSENTIAL HYPERTENSION: ICD-10-CM

## 2024-12-16 DIAGNOSIS — I25.10 NONOBSTRUCTIVE ATHEROSCLEROSIS OF CORONARY ARTERY: ICD-10-CM

## 2024-12-16 NOTE — LETTER
December 16, 2024     Colten Otto MD  [address] [place] [address]    Patient: Jero Higgins   YOB: 1945   Date of Visit: 12/16/2024       Dear Colten Otto MD    Jero Higgins was in my office today. Below is a copy of my note.    If you have questions, please do not hesitate to call me. I look forward to following Jero along with you.         Sincerely,        Mike Higgins MD        CC: No Recipients      Reason for Visit: cardiovascular follow up.    HPI:  Jero Higgins is a 79 y.o. male is here today for 6-month follow-up.  He feels better since his pacemaker was reprogrammed last visit.  He reports not getting enough exercise.  He is doing well for the most part.  His breathing is at baseline.  He denies any chest pain, palpitations, syncope, PND, or orthopnea.  His blood pressure is well controlled at home.  He only gets dizzy when he stands up quickly.  Blood sugar has been well controlled.      Previous Cardiac Testing and Procedures:  -Pacemaker placement (2012) for sick sinus syndrome  -[facility] (12/6/2012) 55% ramus, 30% RCA, otherwise luminal irregularities  -[facility] (7/16/2018) nonobstructive coronary atherosclerosis  -Pacemaker generator change (3/25/2021) [employer]  -Dobutamine stress echo (2/28/2023) no clinical, electrocardiographic, or echocardiographic evidence of ischemia  -Echo (2/28/2023) EF 55-60%, impaired relaxation, mild RV enlargement, normal LA and RA, mild TR  -PFTs (12/2023) moderate obstructive ventilatory defect, slight decrease in inspiratory capacity, mild diffusion impairment that is normal when corrected for alveolar volume  -Chest x-ray (4/13/2024) no consolidation, effusion, or pneumothorax, cardiomediastinal silhouette and pulmonary vessels are within normal limits, pacemaker in place  -Nuclear stress (5/30/2024) fixed basal to mid inferior defect consistent with diaphragmatic attenuation, no evidence of ischemia, EF 43%  -Echo (5/30/2024) EF  55-60%, normal diastolic function for age, normal RV size and function, pacemaker lead present, normal LA size, normal aortic root, no significant valve dysfunction     Lab data:  -BMP (3/20/2024) creatinine 1.49, GFR 47, potassium 4.3, sodium 140  -CBC (3/20/2024) WBC 8.9, hemoglobin 12.5, platelets 260  -proBNP (2024) 935, normal 0-450  -Lipid panel (2024) total cholesterol 167, HDL 38, , triglycerides 129  -BMP (2024) creatinine 1.47, GFR 48, potassium 4.5, sodium 140    Patient Active Problem List   Diagnosis   • Alternating constipation and diarrhea   • Benign essential hypertension   • Bilateral impacted cerumen   • Cholelithiasis without obstruction   • Nonobstructive atherosclerosis of coronary artery   • Gastroesophageal reflux disease without esophagitis   • Personal history of kidney cancer   • Hyperglycemia due to type 2 diabetes mellitus   • Meniere's disease of both ears   • Stage 3b chronic kidney disease   • Sick sinus syndrome   • Renal cell carcinoma   • Pacemaker   • Vertigo   • Paroxysmal atrial fibrillation   • Chronic anticoagulation       Social History     Tobacco Use   • Smoking status: Former     Current packs/day: 0.00     Types: Cigarettes     Quit date: 2020     Years since quittin.5     Passive exposure: Never   • Smokeless tobacco: Never   Vaping Use   • Vaping status: Never Used   Substance Use Topics   • Alcohol use: Not Currently   • Drug use: Never       Family History   Problem Relation Age of Onset   • Heart disease Mother    • Hypertension Father        The following portions of the patient's history were reviewed and updated as appropriate: allergies, current medications, past family history, past medical history, past social history, past surgical history, and problem list.      Current Outpatient Medications:   •  amLODIPine (NORVASC) 5 MG tablet, Take 1 tablet by mouth Daily., Disp: , Rfl:   •  Eliquis 5 MG tablet tablet, Take 1 tablet by mouth  "Every 12 (Twelve) Hours., Disp: , Rfl:   •  famotidine (PEPCID) 20 MG tablet, Take 1 tablet by mouth 2 (Two) Times a Day. (Patient taking differently: Take 1 tablet by mouth Daily.), Disp: , Rfl:   •  losartan (COZAAR) 50 MG tablet, Take 2 tablets by mouth Daily., Disp: , Rfl:   •  metoprolol tartrate (LOPRESSOR) 25 MG tablet, Take 1 tablet by mouth 2 (Two) Times a Day., Disp: , Rfl:   •  propafenone (RYTHMOL) 225 MG tablet, Take 1 tablet by mouth Every 8 (Eight) Hours., Disp: , Rfl:   •  Semaglutide, 1 MG/DOSE, (OZEMPIC) 4 MG/3ML solution pen-injector, Inject  under the skin into the appropriate area as directed 1 (One) Time Per Week., Disp: , Rfl:     Review of Systems   Constitutional: Negative for chills and fever.   Cardiovascular:  Negative for chest pain, paroxysmal nocturnal dyspnea and syncope.   Respiratory:  Negative for cough and shortness of breath.    Skin:  Negative for rash.   Gastrointestinal:  Negative for abdominal pain and heartburn.   Neurological:  Positive for dizziness. Negative for numbness.       Objective  /56   Pulse 68   Ht 188 cm (74\")   Wt 99.3 kg (219 lb)   SpO2 98%   BMI 28.12 kg/m²   Constitutional:       Appearance: Well-developed.   HENT:      Head: Normocephalic and atraumatic.   Pulmonary:      Effort: Pulmonary effort is normal.      Breath sounds: Normal breath sounds.   Cardiovascular:      Normal rate. Regular rhythm.   Edema:     Peripheral edema absent.   Skin:     General: Skin is warm and dry.   Neurological:      Mental Status: Alert and oriented to person, place, and time.         ECG 12 Lead    Date/Time: 2024 11:53 AM  Performed by: Mike Higgins MD    Authorized by: Mike Higgins MD  Comparison: compared with previous ECG from 2024  Similar to previous ECG  Rhythm: paced  Conduction: right bundle branch block  Pacin% capture          ICD-10-CM ICD-9-CM   1. Paroxysmal atrial fibrillation  I48.0 427.31   2. Pacemaker  Z95.0 V45.01 "   3. Nonobstructive atherosclerosis of coronary artery  I25.10 414.00   4. Benign essential hypertension  I10 401.1   5. Chronic anticoagulation  Z79.01 V58.61         Assessment/Plan:  1.  Paroxysmal atrial fibrillation: Seen on prior pacemaker interrogations.  He remains asymptomatic.  Continue metoprolol, propafenone, and Eliquis.     2.  Cardiac pacemaker: Normal function on interrogation on 10/24/2024.     3.  Nonobstructive coronary artery disease: Noted on previous heart cath from 2012 and 2018.  Low risk stress echo on 2/28/2023 and nuclear stress on 5/30/2024 was negative for ischemia.  No current chest pain symptoms.  Plan to discuss statin therapy at follow-up.    4.  Essential hypertension: Blood pressure is low normal today and well-controlled at home.  Continue amlodipine, losartan, and metoprolol.     6.  Chronic anticoagulation: With Eliquis.

## 2024-12-16 NOTE — PROGRESS NOTES
Reason for Visit: cardiovascular follow up.    HPI:  Jero Higgins is a 79 y.o. male is here today for 6-month follow-up.  He feels better since his pacemaker was reprogrammed last visit.  He reports not getting enough exercise.  He is doing well for the most part.  His breathing is at baseline.  He denies any chest pain, palpitations, syncope, PND, or orthopnea.  His blood pressure is well controlled at home.  He only gets dizzy when he stands up quickly.  Blood sugar has been well controlled.      Previous Cardiac Testing and Procedures:  -Pacemaker placement (2012) for sick sinus syndrome  -[facility] (12/6/2012) 55% ramus, 30% RCA, otherwise luminal irregularities  -[facility] (7/16/2018) nonobstructive coronary atherosclerosis  -Pacemaker generator change (3/25/2021) [employer]  -Dobutamine stress echo (2/28/2023) no clinical, electrocardiographic, or echocardiographic evidence of ischemia  -Echo (2/28/2023) EF 55-60%, impaired relaxation, mild RV enlargement, normal LA and RA, mild TR  -PFTs (12/2023) moderate obstructive ventilatory defect, slight decrease in inspiratory capacity, mild diffusion impairment that is normal when corrected for alveolar volume  -Chest x-ray (4/13/2024) no consolidation, effusion, or pneumothorax, cardiomediastinal silhouette and pulmonary vessels are within normal limits, pacemaker in place  -Nuclear stress (5/30/2024) fixed basal to mid inferior defect consistent with diaphragmatic attenuation, no evidence of ischemia, EF 43%  -Echo (5/30/2024) EF 55-60%, normal diastolic function for age, normal RV size and function, pacemaker lead present, normal LA size, normal aortic root, no significant valve dysfunction     Lab data:  -BMP (3/20/2024) creatinine 1.49, GFR 47, potassium 4.3, sodium 140  -CBC (3/20/2024) WBC 8.9, hemoglobin 12.5, platelets 260  -proBNP (4/13/2024) 935, normal 0-450  -Lipid panel (7/26/2024) total cholesterol 167, HDL 38, , triglycerides 129  -BMP (7/26/2024)  creatinine 1.47, GFR 48, potassium 4.5, sodium 140    Patient Active Problem List   Diagnosis    Alternating constipation and diarrhea    Benign essential hypertension    Bilateral impacted cerumen    Cholelithiasis without obstruction    Nonobstructive atherosclerosis of coronary artery    Gastroesophageal reflux disease without esophagitis    Personal history of kidney cancer    Hyperglycemia due to type 2 diabetes mellitus    Meniere's disease of both ears    Stage 3b chronic kidney disease    Sick sinus syndrome    Renal cell carcinoma    Pacemaker    Vertigo    Paroxysmal atrial fibrillation    Chronic anticoagulation       Social History     Tobacco Use    Smoking status: Former     Current packs/day: 0.00     Types: Cigarettes     Quit date: 2020     Years since quittin.5     Passive exposure: Never    Smokeless tobacco: Never   Vaping Use    Vaping status: Never Used   Substance Use Topics    Alcohol use: Not Currently    Drug use: Never       Family History   Problem Relation Age of Onset    Heart disease Mother     Hypertension Father        The following portions of the patient's history were reviewed and updated as appropriate: allergies, current medications, past family history, past medical history, past social history, past surgical history, and problem list.      Current Outpatient Medications:     amLODIPine (NORVASC) 5 MG tablet, Take 1 tablet by mouth Daily., Disp: , Rfl:     Eliquis 5 MG tablet tablet, Take 1 tablet by mouth Every 12 (Twelve) Hours., Disp: , Rfl:     famotidine (PEPCID) 20 MG tablet, Take 1 tablet by mouth 2 (Two) Times a Day. (Patient taking differently: Take 1 tablet by mouth Daily.), Disp: , Rfl:     losartan (COZAAR) 50 MG tablet, Take 2 tablets by mouth Daily., Disp: , Rfl:     metoprolol tartrate (LOPRESSOR) 25 MG tablet, Take 1 tablet by mouth 2 (Two) Times a Day., Disp: , Rfl:     propafenone (RYTHMOL) 225 MG tablet, Take 1 tablet by mouth Every 8 (Eight) Hours.,  "Disp: , Rfl:     Semaglutide, 1 MG/DOSE, (OZEMPIC) 4 MG/3ML solution pen-injector, Inject  under the skin into the appropriate area as directed 1 (One) Time Per Week., Disp: , Rfl:     Review of Systems   Constitutional: Negative for chills and fever.   Cardiovascular:  Negative for chest pain, paroxysmal nocturnal dyspnea and syncope.   Respiratory:  Negative for cough and shortness of breath.    Skin:  Negative for rash.   Gastrointestinal:  Negative for abdominal pain and heartburn.   Neurological:  Positive for dizziness. Negative for numbness.       Objective   /56   Pulse 68   Ht 188 cm (74\")   Wt 99.3 kg (219 lb)   SpO2 98%   BMI 28.12 kg/m²   Constitutional:       Appearance: Well-developed.   HENT:      Head: Normocephalic and atraumatic.   Pulmonary:      Effort: Pulmonary effort is normal.      Breath sounds: Normal breath sounds.   Cardiovascular:      Normal rate. Regular rhythm.   Edema:     Peripheral edema absent.   Skin:     General: Skin is warm and dry.   Neurological:      Mental Status: Alert and oriented to person, place, and time.         ECG 12 Lead    Date/Time: 2024 11:53 AM  Performed by: Mike Higgins MD    Authorized by: Mike Higgins MD  Comparison: compared with previous ECG from 2024  Similar to previous ECG  Rhythm: paced  Conduction: right bundle branch block  Pacin% capture          ICD-10-CM ICD-9-CM   1. Paroxysmal atrial fibrillation  I48.0 427.31   2. Pacemaker  Z95.0 V45.01   3. Nonobstructive atherosclerosis of coronary artery  I25.10 414.00   4. Benign essential hypertension  I10 401.1   5. Chronic anticoagulation  Z79.01 V58.61         Assessment/Plan:  1.  Paroxysmal atrial fibrillation: Seen on prior pacemaker interrogations.  He remains asymptomatic.  Continue metoprolol, propafenone, and Eliquis.     2.  Cardiac pacemaker: Normal function on interrogation on 10/24/2024.     3.  Nonobstructive coronary artery disease: Noted on previous " heart cath from 2012 and 2018.  Low risk stress echo on 2/28/2023 and nuclear stress on 5/30/2024 was negative for ischemia.  No current chest pain symptoms.  Plan to discuss statin therapy at follow-up.    4.  Essential hypertension: Blood pressure is low normal today and well-controlled at home.  Continue amlodipine, losartan, and metoprolol.     6.  Chronic anticoagulation: With Eliquis.

## 2025-02-25 NOTE — PROGRESS NOTES
[facility] - PODIATRY    Today's Date: 2025     Patient Name: Jero Higgins  MRN: [id number]  CSN: [id number]  PCP: Colten Otto MD  Referring Provider: No ref. provider found    SUBJECTIVE     Chief Complaint   Patient presents with    Follow-up     Colten Otto MD 25   Return in about 3 months (around 2025) for With Bety CARBALLO, Diabetic Foot Exam, Schedule Foot Care Clinic. Pt here for nail/foot care. Pt reports no pain.       HPI: Jero Higgins, a 79 y.o.male, comes to clinic as a(n) established patient presenting for diabetic foot exam and complaining of toenail/callus issues. Patient has h/o cancer, CHF, DM2, HTN .  Patient presents with toenails that are long, thick, and crumbly.  Patient is NIDDM and unsure of last BG level.  Last A1c of 6.0%.  Relates only mild numbness in feet. Denies open wounds or sores. He has trouble caring for his toenails himself due to thickness.  Denies pain. Denies previous treatment. Denies any constitutional symptoms. No other pedal complaints at this time.    Past Medical History:   Diagnosis Date    Atrial fibrillation     Cancer     Cancer of kidney     Congestive cardiac failure     Coronary artery disease     Diabetes mellitus     Hypertension      Past Surgical History:   Procedure Laterality Date    APPENDECTOMY      CARDIAC CATHETERIZATION  last 1   approx 3 years    2    INSERT / REPLACE / REMOVE PACEMAKER  2    last 1  about  2 years    PACEMAKER IMPLANTATION       Family History   Problem Relation Age of Onset    Heart disease Mother     Hypertension Father     Colon cancer Neg Hx     Colon polyps Neg Hx     Esophageal cancer Neg Hx      Social History     Socioeconomic History    Marital status:    Tobacco Use    Smoking status: Former     Current packs/day: 0.00     Types: Cigarettes     Quit date: 2020     Years since quittin.7     Passive exposure: Never    Smokeless tobacco: Never   Vaping Use     Vaping status: Never Used   Substance and Sexual Activity    Alcohol use: Not Currently    Drug use: Never    Sexual activity: Yes     Partners: Female     Comment: [id number]     Allergies   Allergen Reactions    Lisinopril Cough    Metformin And Related Nausea And Vomiting     Current Outpatient Medications   Medication Sig Dispense Refill    amLODIPine (NORVASC) 5 MG tablet Take 1 tablet by mouth Daily.      Eliquis 5 MG tablet tablet Take 1 tablet by mouth Every 12 (Twelve) Hours.      famotidine (PEPCID) 20 MG tablet Take 1 tablet by mouth 2 (Two) Times a Day. (Patient taking differently: Take 1 tablet by mouth Daily.)      Farxiga 10 MG tablet       losartan (COZAAR) 50 MG tablet Take 2 tablets by mouth Daily.      metoprolol tartrate (LOPRESSOR) 25 MG tablet Take 1 tablet by mouth 2 (Two) Times a Day.      propafenone (RYTHMOL) 225 MG tablet Take 1 tablet by mouth Every 8 (Eight) Hours.      Semaglutide, 1 MG/DOSE, (OZEMPIC) 4 MG/3ML solution pen-injector Inject  under the skin into the appropriate area as directed 1 (One) Time Per Week. (Patient not taking: Reported on 2/26/2025)       No current facility-administered medications for this visit.     Review of Systems   Constitutional:  Negative for chills and fever.   HENT:  Negative for congestion.    Respiratory:  Negative for shortness of breath.    Cardiovascular:  Negative for chest pain and leg swelling.   Gastrointestinal:  Negative for constipation, diarrhea, nausea and vomiting.   Musculoskeletal:  Positive for arthralgias.   Skin:  Negative for wound.   Neurological:  Negative for numbness.   Psychiatric/Behavioral:  Negative for agitation.        OBJECTIVE     Vitals:    02/26/25 0937   BP: 128/74   Pulse: 74   SpO2: 98%       PHYSICAL EXAM  GEN:   Accompanied by none.     Foot/Ankle Exam    GENERAL  Diabetic foot exam performed    Appearance:  appears stated age  Orientation:  AAOx3  Affect:  appropriate  Gait:  unimpaired  Assistance:   independent  Right shoe gear: casual shoe  Left shoe gear: casual shoe    VASCULAR     Right Foot Vascularity   Dorsalis pedis:  2+  Posterior tibial:  2+  Skin temperature:  warm  Edema grading:  None  CFT:  3  Pedal hair growth:  Present  Varicosities:  none     Left Foot Vascularity   Dorsalis pedis:  2+  Posterior tibial:  2+  Skin temperature:  warm  Edema grading:  None  CFT:  3  Pedal hair growth:  Present  Varicosities:  none     NEUROLOGIC     Right Foot Neurologic   Light touch sensation: diminished  Vibratory sensation: diminished  Hot/Cold sensation: diminished  Protective Sensation using [place]yaan Monofilament:   Sites intact: 9  Sites tested: 10     Left Foot Neurologic   Light touch sensation: diminished  Vibratory sensation: diminished  Hot/Cold sensation:  diminished  Protective Sensation using [place]yaan Monofilament:   Sites intact: 10  Sites tested: 10    MUSCULOSKELETAL     Right Foot Musculoskeletal   Ecchymosis:  none  Tenderness:  toenail problem    Arch:  Normal  Claw toe:  Second toe, third toe, fourth toe and fifth toe     Left Foot Musculoskeletal   Ecchymosis:  none  Tenderness:  toenail problem  Arch:  Normal  Claw toe:  Second toe, third toe, fourth toe and fifth toe    MUSCLE STRENGTH     Right Foot Muscle Strength   Foot dorsiflexion:  5  Foot plantar flexion:  5  Foot inversion:  5  Foot eversion:  5     Left Foot Muscle Strength   Foot dorsiflexion:  5  Foot plantar flexion:  5  Foot inversion:  5  Foot eversion:  5    RANGE OF MOTION     Right Foot Range of Motion   Foot and ankle ROM within normal limits       Left Foot Range of Motion   Foot and ankle ROM within normal limits      DERMATOLOGIC      Right Foot Dermatologic   Skin  Right foot skin is intact.   Nails  1.  Positive for elongated, onychomycosis, abnormal thickness, subungual debris and dystrophic nail.  2.  Positive for elongated, onychomycosis, abnormal thickness and subungual debris.  3.  Positive for  elongated, onychomycosis, abnormal thickness and subungual debris.  4.  Positive for elongated, onychomycosis, abnormal thickness and subungual debris.  5.  Positive for elongated, onychomycosis, abnormal thickness and subungual debris.     Left Foot Dermatologic   Skin  Left foot skin is intact.   Nails  1.  Positive for elongated, onychomycosis, abnormal thickness, subungual debris and dystrophic nail.  2.  Positive for elongated, onychomycosis, abnormal thickness and subungual debris.  3.  Positive for elongated, onychomycosis, abnormal thickness and subungual debris.  4.  Positive for elongated, onychomycosis, abnormally thick and subungual debris.  5.  Positive for elongated, onychomycosis, abnormally thick and subungual debris.    Image:       RADIOLOGY/NUCLEAR:  No results found.    LABORATORY/CULTURE RESULTS:      PATHOLOGY RESULTS:       [id number]/PLAN     Diagnoses and all orders for this visit:    1. Onychomycosis (Primary)    2. Type 2 diabetes mellitus with diabetic neuropathy, without long-term current use of insulin    3. Stage 3b chronic kidney disease    4. Foot callus [L84]    5. Claw toe    6. Encounter for diabetic foot exam      Comprehensive lower extremity examination and evaluation was performed.  Discussed findings and treatment plan including risks, benefits, and treatment options with patient in detail. Patient agreed with treatment plan.  PCP note reviewed.  Hemoglobin A1c reviewed.  Diabetic foot exam performed.  After verbal consent obtained, nail(s) x10 debrided of length and thickness with nail nipper without incidence  After verbal consent obtained, calluses x1 pared utilizing dermal curette and/or scalpel without incidence  Patient may maintain nails and calluses at home utilizing emery board or pumice stone between visits as needed  Reviewed at home diabetic foot care including daily foot checks   Continue BG control with assistance of PCP.  Patient is at an increased risk of  foot complications due to chronic kidney disease.    Continue to follow up with PCP for treatment of CKD and routine labs.    An After Visit Summary was printed and given to the patient at discharge, including (if requested) any available informative/educational handouts regarding diagnosis, treatment, or medications. All questions were answered to patient/family satisfaction. Should symptoms fail to improve or worsen they agree to call or return to clinic or to go to the Emergency Department. Discussed the importance of following up with any needed screening tests/labs/specialist appointments and any requested follow-up recommended by me today. Importance of maintaining follow-up discussed and patient accepts that missed appointments can delay diagnosis and potentially lead to worsening of conditions.  Return in about 3 months (around 5/26/2025) for With Bety CARBALLO, Schedule Foot Care Clinic., or sooner if acute issues arise.        This document has been electronically signed by KAIT Gee on February 26, 2025 13:33 CST

## 2025-02-26 ENCOUNTER — OFFICE VISIT (OUTPATIENT)
Age: 80
End: 2025-02-26
Payer: MEDICARE

## 2025-02-26 VITALS
BODY MASS INDEX: 28.11 KG/M2 | HEIGHT: 74 IN | HEART RATE: 74 BPM | DIASTOLIC BLOOD PRESSURE: 74 MMHG | OXYGEN SATURATION: 98 % | WEIGHT: 219 LBS | SYSTOLIC BLOOD PRESSURE: 128 MMHG

## 2025-02-26 DIAGNOSIS — E11.9 ENCOUNTER FOR DIABETIC FOOT EXAM: ICD-10-CM

## 2025-02-26 DIAGNOSIS — N18.32 STAGE 3B CHRONIC KIDNEY DISEASE: ICD-10-CM

## 2025-02-26 DIAGNOSIS — E11.40 TYPE 2 DIABETES MELLITUS WITH DIABETIC NEUROPATHY, WITHOUT LONG-TERM CURRENT USE OF INSULIN: ICD-10-CM

## 2025-02-26 DIAGNOSIS — Q66.89 CLAW TOE: ICD-10-CM

## 2025-02-26 DIAGNOSIS — B35.1 ONYCHOMYCOSIS: Primary | ICD-10-CM

## 2025-02-26 DIAGNOSIS — L84 FOOT CALLUS: ICD-10-CM

## 2025-02-26 RX ORDER — DAPAGLIFLOZIN 10 MG/1
TABLET, FILM COATED ORAL
COMMUNITY
Start: 2025-01-28

## 2025-03-14 ENCOUNTER — TELEPHONE (OUTPATIENT)
Dept: CARDIOLOGY | Facility: CLINIC | Age: 80
End: 2025-03-14
Payer: MEDICARE

## 2025-03-14 NOTE — TELEPHONE ENCOUNTER
Patient left voicemail for RN stating that his monitor wasn't working correctly.  RN returned call - Patient has a [employer] Relay bedside monitor.  Patient states the light stays on all of the time and keeps him awake at night.  RN verified that monitor is connected to the network and patient successfully sent a forced transmission.  RN informed patient that the light sensor on the monitor must be broken (normal function has light go off when light in room is turned off) - Patient advised to lay a thin dark colored pillowcase over the monitor to see if that prevents it from lighting up his room at night.  If that does not help, RN will provided him with number for [employer] to get their recommendations. Understanding verbalized.

## 2025-03-18 NOTE — PROGRESS NOTES
Chief Complaint   Patient presents with    GI Problem     Having reflux never had endo       PCP: Colten Otto MD  REFER: Colten Otto MD    Subjective       HPI    Jero Higgins presents to office with complaints of heartburn noted to be worse at night.  This occurred apx 2 months ago.  Acid reflux suspected being related to covid.  Currently symptoms described as improved.  No medication changes, no lifestyle changes to result in improvement in symptoms.   Jero Higgins has been compliance with nightly pepcid for over 2 years.   There is currently some dysphagia with solid foods.   There has not been weight loss.  He denies regular use of NSAIDs.      No previous EGD    Colonoscopy 2019 - recall date recommended for 5 years - [facility] - Care Everywhere - tubular adenoma     Colonoscopy 2016, -hyperplastic polyp -[facility]     Past Medical History:   Diagnosis Date    Atrial fibrillation     Cancer     Cancer of kidney     Congestive cardiac failure     Coronary artery disease     Diabetes mellitus     Hypertension      Past Surgical History:   Procedure Laterality Date    APPENDECTOMY      CARDIAC CATHETERIZATION  last 1   approx 3 years    2    INSERT / REPLACE / REMOVE PACEMAKER  2    last 1  about  2 years    PACEMAKER IMPLANTATION       Outpatient Medications Marked as Taking for the 3/19/25 encounter (Office Visit) with Tye Tai APRN   Medication Sig Dispense Refill    amLODIPine (NORVASC) 5 MG tablet Take 1 tablet by mouth Daily.      Eliquis 5 MG tablet tablet Take 1 tablet by mouth Every 12 (Twelve) Hours.      famotidine (PEPCID) 20 MG tablet Take 1 tablet by mouth 2 (Two) Times a Day. (Patient taking differently: Take 1 tablet by mouth Daily.)      Farxiga 10 MG tablet       fluticasone-salmeterol (ADVAIR HFA) 45-21 MCG/ACT inhaler Inhale 2 puffs 2 (Two) Times a Day.      losartan (COZAAR) 50 MG tablet Take 2 tablets by mouth Daily.      metoprolol tartrate  "(LOPRESSOR) 25 MG tablet Take 1 tablet by mouth 2 (Two) Times a Day.      propafenone (RYTHMOL) 225 MG tablet Take 1 tablet by mouth Every 8 (Eight) Hours.       Allergies   Allergen Reactions    Lisinopril Cough    Metformin And Related Nausea And Vomiting     Social History     Socioeconomic History    Marital status:    Tobacco Use    Smoking status: Former     Current packs/day: 0.00     Types: Cigarettes     Quit date: 2020     Years since quittin.8     Passive exposure: Never    Smokeless tobacco: Never   Vaping Use    Vaping status: Never Used   Substance and Sexual Activity    Alcohol use: Not Currently    Drug use: Never    Sexual activity: Yes     Partners: Female     Comment: [id number]     Review of Systems   Constitutional:  Negative for fever and unexpected weight change.   HENT:  Negative for trouble swallowing.    Respiratory:  Negative for shortness of breath.    Cardiovascular:  Negative for chest pain.   Gastrointestinal:  Negative for abdominal pain and anal bleeding.   Objective   Vitals:    25 0812   BP: 130/80   Pulse: 96   Temp: 97 °F (36.1 °C)   SpO2: 98%   Weight: 102 kg (225 lb)   Height: 188 cm (74\")     Physical Exam  Constitutional:       Appearance: Normal appearance. He is well-developed.   Eyes:      General: No scleral icterus.  Cardiovascular:      Heart sounds: Normal heart sounds. No murmur heard.  Pulmonary:      Effort: Pulmonary effort is normal.   Abdominal:      General: Bowel sounds are normal. There is no distension.      Palpations: Abdomen is soft.      Tenderness: There is no abdominal tenderness. There is no guarding.   Skin:     General: Skin is warm and dry.      Coloration: Skin is not jaundiced.   Neurological:      Mental Status: He is alert.   Psychiatric:         Behavior: Behavior is cooperative.       Imaging Results (Most Recent)       None          Body mass index is 28.89 kg/m².  Assessment & Plan   Diagnoses and all orders for this " visit:    1. Gastroesophageal reflux disease, unspecified whether esophagitis present (Primary)  -     Case Request; Standing  -     Implement Anesthesia Orders Day of Procedure; Standing  -     Follow Anesthesia Guidelines / Protocol; Future  -     Case Request    2. Anticoagulated    3. Dysphagia, unspecified type    4. History of adenomatous polyp of colon        ESOPHAGOGASTRODUODENOSCOPY WITH ANESTHESIA- (examination of lining of the esophagus, stomach, and first part of small intestine) (N/A)        Jero Higgins was under impression he was recommended to have 10 years recall on colonoscopy.  I called Traylamine Higgins after he left the office to discuss his previous history of colon polyps and that with type of polyp he had a 5 year recall would be indicated.  I discussed adding colonoscopy onto scheduled EGD.  Jero Higgins verbalized understanding and wished to speak to Colten Otto MD prior to making further decision.  Jero Higgins will notify office prior to EGD if he wishes to have colonoscopy.     Continue pepcid as previously prescribed, take at bedtime  Decrease caffeine, nicotine, etoh- all contribute to acid reflux  Do not eat 2-3 hours within lying down, elevated HOB 4-6 inches    Discussed EGD due to occasional dysphagia  vs no EGD with continuation of current medication in light of improvement in symptoms.  Jero Higgins verbalized understanding of options    The risks of the endoscopy were discussed in detail.  We discussed the risks of perforation (one out of 8160-8284, riskier with dilation), bleeding (one out of 500), and the rare risks of infection, adverse reaction to anesthesia, respiratory failure, cardiac failure including MI and adverse reaction to medications, etc.  We discussed consequences that could occur if a risk were to develop such as the need for hospitalization, blood transfusion, surgical intervention, medications, pain and disability and death.  Alternatives include not  doing anything, or pursuing an UGI series which only offers a diagnosis with potential less accuracy compared to EGD.  The patient verbalizes understanding and agrees to proceed.    Patient is to hold their anticoagulation medication per the direction of their prescribing provider,  Colten Otto MD . This is to prevent any risk or complication from bleeding intra and post procedure. If they develop bleeding post procedure they are to go the emergency department for further evaluation and treatment immediately.  We will obtain clearance from prescribing provider requesting Eliquis will need to be held x 2 days prior to procedure.            Tye Tai, APRN  03/19/25      Gastroesophageal Reflux Disease, Adult    Gastroesophageal reflux disease (GERD) happens when acid from your stomach flows up into the esophagus. When acid comes in contact with the esophagus, the acid causes soreness (inflammation) in the esophagus. Over time, GERD may create small holes (ulcers) in the lining of the esophagus.  CAUSES    Increased body weight. This puts pressure on the stomach, making acid rise from the stomach into the esophagus.  Smoking. This increases acid production in the stomach.  Drinking alcohol. This causes decreased pressure in the lower esophageal sphincter (valve or ring of muscle between the esophagus and stomach), allowing acid from the stomach into the esophagus.  Late evening meals and a full stomach. This increases pressure and acid production in the stomach.  A malformed lower esophageal sphincter.  Sometimes, no cause is found.  SYMPTOMS    Burning pain in the lower part of the mid-chest behind the breastbone and in the mid-stomach area. This may occur twice a week or more often.  Trouble swallowing.  Sore throat.  Dry cough.  Asthma-like symptoms including chest tightness, shortness of breath, or wheezing.  DIAGNOSIS    Your caregiver may be able to diagnose GERD based on your symptoms. In  some cases, X-rays and other tests may be done to check for complications or to check the condition of your stomach and esophagus.  TREATMENT    Your caregiver may recommend over-the-counter or prescription medicines to help decrease acid production. Ask your caregiver before starting or adding any new medicines.    HOME CARE INSTRUCTIONS    Change the factors that you can control. Ask your caregiver for guidance concerning weight loss, quitting smoking, and alcohol consumption.  Avoid foods and drinks that make your symptoms worse, such as:  Caffeine or alcoholic drinks.  Chocolate.  Peppermint or mint flavorings.  Garlic and onions.  Spicy foods.  Citrus fruits, such as oranges, homa, or limes.  Tomato-based foods such as sauce, chili, salsa, and pizza.  Fried and fatty foods.  Avoid lying down for the 3 hours prior to your bedtime or prior to taking a nap.  Eat small, frequent meals instead of large meals.  Wear loose-fitting clothing. Do not wear anything tight around your waist that causes pressure on your stomach.  Raise the head of your bed 6 to 8 inches with wood blocks to help you sleep. Extra pillows will not help.  Only take over-the-counter or prescription medicines for pain, discomfort, or fever as directed by your caregiver.  Do not take aspirin, ibuprofen, or other nonsteroidal anti-inflammatory drugs (NSAIDs).  SEEK IMMEDIATE MEDICAL CARE IF:    You have pain in your arms, neck, jaw, teeth, or back.  Your pain increases or changes in intensity or duration.  You develop nausea, vomiting, or sweating (diaphoresis).  You develop shortness of breath, or you faint.  Your vomit is green, yellow, black, or looks like coffee grounds or blood.  Your stool is red, bloody, or black.  These symptoms could be signs of other problems, such as heart disease, gastric bleeding, or esophageal bleeding.  MAKE SURE YOU:    Understand these instructions.  Will watch your condition.  Will get help right away if you are  not doing well or get worse.     This information is not intended to replace advice given to you by your health care provider. Make sure you discuss any questions you have with your health care provider.     Document Released: 09/27/2006 Document Revised: 01/08/2016 Document Reviewed: 04/13/2016  Elsevier Interactive Patient Education ©2016 [employer]

## 2025-03-19 ENCOUNTER — OFFICE VISIT (OUTPATIENT)
Dept: GASTROENTEROLOGY | Facility: CLINIC | Age: 80
End: 2025-03-19
Payer: MEDICARE

## 2025-03-19 VITALS
OXYGEN SATURATION: 98 % | SYSTOLIC BLOOD PRESSURE: 130 MMHG | HEART RATE: 96 BPM | TEMPERATURE: 97 F | BODY MASS INDEX: 28.88 KG/M2 | WEIGHT: 225 LBS | DIASTOLIC BLOOD PRESSURE: 80 MMHG | HEIGHT: 74 IN

## 2025-03-19 DIAGNOSIS — Z79.01 ANTICOAGULATED: ICD-10-CM

## 2025-03-19 DIAGNOSIS — K21.9 GASTROESOPHAGEAL REFLUX DISEASE, UNSPECIFIED WHETHER ESOPHAGITIS PRESENT: Primary | ICD-10-CM

## 2025-03-19 DIAGNOSIS — Z86.0101 HISTORY OF ADENOMATOUS POLYP OF COLON: ICD-10-CM

## 2025-03-19 DIAGNOSIS — R13.10 DYSPHAGIA, UNSPECIFIED TYPE: ICD-10-CM

## 2025-03-19 RX ORDER — FLUTICASONE PROPIONATE AND SALMETEROL XINAFOATE 45; 21 UG/1; UG/1
2 AEROSOL, METERED RESPIRATORY (INHALATION)
COMMUNITY

## 2025-04-10 RX ORDER — PROPAFENONE HYDROCHLORIDE 225 MG/1
TABLET, FILM COATED ORAL
Qty: 270 TABLET | Refills: 3 | Status: SHIPPED | OUTPATIENT
Start: 2025-04-10

## 2025-04-16 ENCOUNTER — TELEPHONE (OUTPATIENT)
Dept: GASTROENTEROLOGY | Facility: CLINIC | Age: 80
End: 2025-04-16
Payer: MEDICARE

## 2025-04-29 ENCOUNTER — ANESTHESIA (OUTPATIENT)
Dept: GASTROENTEROLOGY | Facility: HOSPITAL | Age: 80
End: 2025-04-29
Payer: MEDICARE

## 2025-04-29 ENCOUNTER — HOSPITAL ENCOUNTER (OUTPATIENT)
Facility: HOSPITAL | Age: 80
Setting detail: HOSPITAL OUTPATIENT SURGERY
Discharge: HOME | End: 2025-04-29
Attending: INTERNAL MEDICINE | Admitting: INTERNAL MEDICINE
Payer: MEDICARE

## 2025-04-29 ENCOUNTER — ANESTHESIA EVENT (OUTPATIENT)
Dept: GASTROENTEROLOGY | Facility: HOSPITAL | Age: 80
End: 2025-04-29
Payer: MEDICARE

## 2025-04-29 VITALS
OXYGEN SATURATION: 93 % | HEIGHT: 74 IN | TEMPERATURE: 96.6 F | DIASTOLIC BLOOD PRESSURE: 82 MMHG | RESPIRATION RATE: 18 BRPM | HEART RATE: 65 BPM | BODY MASS INDEX: 28.88 KG/M2 | WEIGHT: 225 LBS | SYSTOLIC BLOOD PRESSURE: 139 MMHG

## 2025-04-29 DIAGNOSIS — K21.9 GASTROESOPHAGEAL REFLUX DISEASE, UNSPECIFIED WHETHER ESOPHAGITIS PRESENT: ICD-10-CM

## 2025-04-29 PROCEDURE — 88305 TISSUE EXAM BY PATHOLOGIST: CPT | Performed by: INTERNAL MEDICINE

## 2025-04-29 PROCEDURE — 25810000003 SODIUM CHLORIDE 0.9 % SOLUTION: Performed by: ANESTHESIOLOGY

## 2025-04-29 PROCEDURE — 43239 EGD BIOPSY SINGLE/MULTIPLE: CPT | Performed by: INTERNAL MEDICINE

## 2025-04-29 PROCEDURE — 25010000002 PROPOFOL 10 MG/ML EMULSION: Performed by: NURSE ANESTHETIST, CERTIFIED REGISTERED

## 2025-04-29 PROCEDURE — 25010000002 LIDOCAINE PF 2% 2 % SOLUTION: Performed by: NURSE ANESTHETIST, CERTIFIED REGISTERED

## 2025-04-29 PROCEDURE — 45385 COLONOSCOPY W/LESION REMOVAL: CPT | Performed by: INTERNAL MEDICINE

## 2025-04-29 PROCEDURE — 25810000003 SODIUM CHLORIDE 0.9 % SOLUTION: Performed by: NURSE ANESTHETIST, CERTIFIED REGISTERED

## 2025-04-29 RX ORDER — SODIUM CHLORIDE 9 MG/ML
500 INJECTION, SOLUTION INTRAVENOUS ONCE
Status: COMPLETED | OUTPATIENT
Start: 2025-04-29 | End: 2025-04-29

## 2025-04-29 RX ORDER — SODIUM CHLORIDE 9 MG/ML
INJECTION, SOLUTION INTRAVENOUS CONTINUOUS PRN
Status: DISCONTINUED | OUTPATIENT
Start: 2025-04-29 | End: 2025-04-29 | Stop reason: SURG

## 2025-04-29 RX ORDER — LIDOCAINE HYDROCHLORIDE 20 MG/ML
INJECTION, SOLUTION EPIDURAL; INFILTRATION; INTRACAUDAL; PERINEURAL AS NEEDED
Status: DISCONTINUED | OUTPATIENT
Start: 2025-04-29 | End: 2025-04-29 | Stop reason: SURG

## 2025-04-29 RX ORDER — SODIUM CHLORIDE 0.9 % (FLUSH) 0.9 %
10 SYRINGE (ML) INJECTION AS NEEDED
Status: DISCONTINUED | OUTPATIENT
Start: 2025-04-29 | End: 2025-04-29 | Stop reason: HOSPADM

## 2025-04-29 RX ORDER — PROPOFOL 10 MG/ML
VIAL (ML) INTRAVENOUS AS NEEDED
Status: DISCONTINUED | OUTPATIENT
Start: 2025-04-29 | End: 2025-04-29 | Stop reason: SURG

## 2025-04-29 RX ADMIN — GLYCOPYRROLATE 0.1 MG: 0.2 INJECTION INTRAMUSCULAR; INTRAVENOUS at 11:12

## 2025-04-29 RX ADMIN — SODIUM CHLORIDE 500 ML: 9 INJECTION, SOLUTION INTRAVENOUS at 10:08

## 2025-04-29 RX ADMIN — SODIUM CHLORIDE: 9 INJECTION, SOLUTION INTRAVENOUS at 11:11

## 2025-04-29 RX ADMIN — PROPOFOL 200 MG: 10 INJECTION, EMULSION INTRAVENOUS at 11:14

## 2025-04-29 RX ADMIN — LIDOCAINE HYDROCHLORIDE 160 MG: 20 INJECTION, SOLUTION EPIDURAL; INFILTRATION; INTRACAUDAL; PERINEURAL at 11:14

## 2025-04-29 NOTE — ANESTHESIA PREPROCEDURE EVALUATION
Anesthesia Evaluation     Patient summary reviewed   no history of anesthetic complications:   NPO Solid Status: > 8 hours  NPO Liquid Status: > 8 hours           Airway   Mallampati: II  Dental    (+) edentulous    Pulmonary - negative pulmonary ROS   (-) asthma, sleep apnea, not a smoker  Cardiovascular     (+) pacemaker (AP 99.8% , medtronic) pacemaker, hypertension, CAD, dysrhythmias Atrial Fib, CHF       Neuro/Psych  (-) seizures, TIA, CVA  GI/Hepatic/Renal/Endo    (+) renal disease (renal cell, partial nephrectomy)- CRI, diabetes mellitus  (-) liver disease    Musculoskeletal     Abdominal    Substance History      OB/GYN          Other                      Anesthesia Plan    ASA 3     MAC       Anesthetic plan, risks, benefits, and alternatives have been provided, discussed and informed consent has been obtained with: patient.    CODE STATUS:

## 2025-04-29 NOTE — ANESTHESIA POSTPROCEDURE EVALUATION
Patient: Jero Higgins    Procedure Summary       Date: 04/29/25 Room / Location:  [facility] /  [facility]    Anesthesia Start: 1111 Anesthesia Stop: 1137    Procedures:       ESOPHAGOGASTRODUODENOSCOPY WITH ANESTHESIA- (examination of lining of the esophagus, stomach, and first part of small intestine)      COLONOSCOPY WITH ANESTHESIA Diagnosis:       Gastroesophageal reflux disease, unspecified whether esophagitis present      (Gastroesophageal reflux disease, unspecified whether esophagitis present [K21.9])    Surgeons: Cory Nelson DO Provider: Janice Newman CRNA    Anesthesia Type: MAC ASA Status: 3            Anesthesia Type: MAC    Vitals  Vitals Value Taken Time   /71 04/29/25 11:56   Temp     Pulse 61 04/29/25 11:58   Resp 15 04/29/25 11:55   SpO2 93 % 04/29/25 11:58   Vitals shown include unfiled device data.        Post Anesthesia Care and Evaluation    Patient location during evaluation: PHASE II  Patient participation: complete - patient participated  Level of consciousness: awake and alert  Pain management: adequate    Airway patency: patent  Anesthetic complications: No anesthetic complications  PONV Status: none  Cardiovascular status: acceptable  Respiratory status: acceptable  Hydration status: acceptable  No anesthesia care post op

## 2025-04-29 NOTE — H&P
[facility] Gastroenterology  Pre Procedure History & Physical    Chief Complaint:   GERD    Subjective     HPI:   GERD    Past Medical History:   Past Medical History:   Diagnosis Date    Atrial fibrillation     Cancer     Cancer of kidney     Congestive cardiac failure     Coronary artery disease     Diabetes mellitus     GERD (gastroesophageal reflux disease)     Hypertension        Past Surgical History:  Past Surgical History:   Procedure Laterality Date    APPENDECTOMY      CARDIAC CATHETERIZATION  last 1   approx 3 years    2    COLONOSCOPY      INSERT / REPLACE / REMOVE PACEMAKER  2    last 1  about  2 years    PACEMAKER IMPLANTATION         Family History:  Family History   Problem Relation Age of Onset    Heart disease Mother     Hypertension Father     Colon cancer Neg Hx     Colon polyps Neg Hx     Esophageal cancer Neg Hx        Social History:   reports that he quit smoking about 4 years ago. His smoking use included cigarettes. He has never been exposed to tobacco smoke. He has never used smokeless tobacco. He reports that he does not currently use alcohol. He reports that he does not use drugs.    Medications:   Prior to Admission medications    Medication Sig Start Date End Date Taking? Authorizing Provider   amLODIPine (NORVASC) 5 MG tablet Take 1 tablet by mouth Daily.   Yes ProviderEron MD   famotidine (PEPCID) 20 MG tablet Take 1 tablet by mouth 2 (Two) Times a Day.  Patient taking differently: Take 1 tablet by mouth Daily.   Yes ProviderEron MD   Farxiga 10 MG tablet  1/28/25  Yes ProviderEron MD   losartan (COZAAR) 50 MG tablet Take 2 tablets by mouth Daily.   Yes ProviderEron MD   metoprolol tartrate (LOPRESSOR) 25 MG tablet Take 1 tablet by mouth 2 (Two) Times a Day.   Yes ProviderEron MD   propafenone (RYTHMOL) 225 MG tablet Take 1 tablet by mouth Every 8 (Eight) Hours.   Yes ProviderEron MD   Eliquis 5 MG tablet tablet Take 1 tablet  "by mouth Every 12 (Twelve) Hours. 4/13/24   Eron Graham MD   fluticasone-salmeterol (ADVAIR HFA) 45-21 MCG/ACT inhaler Inhale 2 puffs 2 (Two) Times a Day.    Eron Graham MD   Semaglutide, 1 MG/DOSE, (OZEMPIC) 4 MG/3ML solution pen-injector Inject  under the skin into the appropriate area as directed 1 (One) Time Per Week.  Patient not taking: Reported on 3/19/2025    ProviderEron MD       Allergies:  Lisinopril and Metformin and related    ROS:    General: Weight stable  Resp: No SOA  Cardiovascular: No CP    Objective     Blood pressure 131/78, pulse 75, temperature 96.6 °F (35.9 °C), temperature source Tympanic, resp. rate 18, height 188 cm (74\"), weight 102 kg (225 lb), SpO2 96%.    Physical Exam   Constitutional: Pt is oriented to person, place, and in no distress.   HENT: Mouth/Throat: Oropharynx is clear.   Cardiovascular: Normal rate, regular rhythm.    Pulmonary/Chest: Effort normal. No respiratory distress. No  wheezes.   Abdominal: Soft. Non-distended.  Skin: Skin is warm and dry.   Psychiatric: Mood, memory, affect and judgment appear normal.     Assessment & Plan     Diagnosis:  GERD/Polyps    Anticipated Surgical Procedure:  E/C    The risks, benefits, and alternatives of this procedure have been discussed with the patient or the responsible party- the patient understands and agrees to proceed.        "

## 2025-04-30 LAB
LAB AP CASE REPORT: NORMAL
LAB AP CLINICAL INFORMATION: NORMAL
Lab: NORMAL
PATH REPORT.FINAL DX SPEC: NORMAL
PATH REPORT.GROSS SPEC: NORMAL

## 2025-05-01 ENCOUNTER — RESULTS FOLLOW-UP (OUTPATIENT)
Dept: GASTROENTEROLOGY | Facility: HOSPITAL | Age: 80
End: 2025-05-01
Payer: MEDICARE

## 2025-05-01 NOTE — TELEPHONE ENCOUNTER
----- Message from Cory Nelson sent at 4/30/2025  1:18 PM CDT -----  Please let patient know small bowel biopsy was negative     Thank you  ----- Message -----  From: Lab, Background User  Sent: 4/30/2025  11:31 AM CDT  To: Cory Nelson, DO

## 2025-05-20 NOTE — PROGRESS NOTES
[facility] - PODIATRY    Today's Date: 05/28/2025     Patient Name: Jero Higgins  MRN: [id number]  CSN: [id number]  PCP: Colten Otto MD  Referring Provider: No ref. provider found    SUBJECTIVE     Chief Complaint   Patient presents with    Follow-up     Colten Otto MD 04/28/25  Return in about 3 months for Foot Care Clinic   Ppt here for nail/foot care. Pt reports no pain or issues.      Diabetes     HPI: Jero Higgins, a 79 y.o.male, comes to clinic as a(n) established patient presenting for diabetic foot exam and complaining of toenail/callus issues. Patient has h/o cancer, CHF, DM2, HTN .  Patient presents with toenails that are long, thick, and crumbly.  Patient is NIDDM and unsure of last BG level. Relates only mild numbness in feet. Denies open wounds or sores. He has trouble caring for his toenails himself due to thickness. Reports he has  Denies pain. Denies previous treatment. Denies any constitutional symptoms. No other pedal complaints at this time.    Past Medical History:   Diagnosis Date    Atrial fibrillation     Cancer kidney    Cancer of kidney     Congestive cardiac failure     Coronary artery disease     Diabetes mellitus     GERD (gastroesophageal reflux disease)     Hypertension 2024     Past Surgical History:   Procedure Laterality Date    APPENDECTOMY      CARDIAC CATHETERIZATION  last 1   approx 3 years    2    COLONOSCOPY      COLONOSCOPY N/A 4/29/2025    Procedure: COLONOSCOPY WITH ANESTHESIA;  Surgeon: Cory Nelson DO;  Location: [facility] ENDOSCOPY;  Service: Gastroenterology;  Laterality: N/A;  pre:   post: polyps  [place]    ENDOSCOPY N/A 4/29/2025    Procedure: ESOPHAGOGASTRODUODENOSCOPY WITH ANESTHESIA- (examination of lining of the esophagus, stomach, and first part of small intestine);  Surgeon: Cory Nelson DO;  Location: [facility] ENDOSCOPY;  Service: Gastroenterology;  Laterality: N/A;  pre: reflux  post:bx @ duodenum   [place]    INSERT /  REPLACE / REMOVE PACEMAKER  2    last 1  about  2 years    PACEMAKER IMPLANTATION       Family History   Problem Relation Age of Onset    Heart disease Mother     Cancer Mother     Hypertension Father     Diabetes Father     Cancer Brother     Colon cancer Neg Hx     Colon polyps Neg Hx     Esophageal cancer Neg Hx      Social History     Socioeconomic History    Marital status:    Tobacco Use    Smoking status: Former     Current packs/day: 0.00     Types: Cigarettes     Quit date: 2020     Years since quittin.0     Passive exposure: Never    Smokeless tobacco: Never   Vaping Use    Vaping status: Never Used   Substance and Sexual Activity    Alcohol use: Not Currently    Drug use: Never    Sexual activity: Yes     Partners: Female     Comment: [id number]     Allergies   Allergen Reactions    Lisinopril Cough    Metformin And Related Nausea And Vomiting     Current Outpatient Medications   Medication Sig Dispense Refill    amLODIPine (NORVASC) 5 MG tablet Take 1 tablet by mouth Daily.      Eliquis 5 MG tablet tablet Take 1 tablet by mouth Every 12 (Twelve) Hours.      famotidine (PEPCID) 20 MG tablet Take 1 tablet by mouth 2 (Two) Times a Day. (Patient taking differently: Take 1 tablet by mouth Daily.)      Farxiga 10 MG tablet       fluticasone-salmeterol (ADVAIR HFA) 45-21 MCG/ACT inhaler Inhale 2 puffs 2 (Two) Times a Day.      losartan (COZAAR) 50 MG tablet Take 2 tablets by mouth Daily.      metoprolol tartrate (LOPRESSOR) 25 MG tablet Take 1 tablet by mouth 2 (Two) Times a Day.      propafenone (RYTHMOL) 225 MG tablet Take 1 tablet by mouth Every 8 (Eight) Hours.      Semaglutide, 1 MG/DOSE, (OZEMPIC) 4 MG/3ML solution pen-injector Inject  under the skin into the appropriate area as directed 1 (One) Time Per Week. (Patient not taking: Reported on 2025)       No current facility-administered medications for this visit.     Review of Systems   Constitutional:  Negative for chills and fever.    HENT:  Negative for congestion.    Respiratory:  Negative for shortness of breath.    Cardiovascular:  Negative for chest pain and leg swelling.   Gastrointestinal:  Negative for constipation, diarrhea, nausea and vomiting.   Musculoskeletal:  Positive for arthralgias.   Skin:  Negative for wound.   Neurological:  Negative for numbness.   Psychiatric/Behavioral:  Negative for agitation.        OBJECTIVE     Vitals:    05/28/25 0836   BP: 116/70   Pulse: 64   SpO2: 99%         PHYSICAL EXAM  GEN:   Accompanied by none.     Foot/Ankle Exam    GENERAL  Diabetic foot exam performed    Appearance:  appears stated age  Orientation:  AAOx3  Affect:  appropriate  Gait:  unimpaired  Assistance:  independent  Right shoe gear: casual shoe  Left shoe gear: casual shoe    VASCULAR     Right Foot Vascularity   Dorsalis pedis:  2+  Posterior tibial:  2+  Skin temperature:  warm  Edema grading:  None  CFT:  3  Pedal hair growth:  Present  Varicosities:  none     Left Foot Vascularity   Dorsalis pedis:  2+  Posterior tibial:  2+  Skin temperature:  warm  Edema grading:  None  CFT:  3  Pedal hair growth:  Present  Varicosities:  none     NEUROLOGIC     Right Foot Neurologic   Light touch sensation: diminished  Vibratory sensation: diminished  Hot/Cold sensation: diminished  Protective Sensation using [place] Monofilament:   Sites intact: 9  Sites tested: 10     Left Foot Neurologic   Light touch sensation: diminished  Vibratory sensation: diminished  Hot/Cold sensation:  diminished  Protective Sensation using [place] Monofilament:   Sites intact: 10  Sites tested: 10    MUSCULOSKELETAL     Right Foot Musculoskeletal   Ecchymosis:  none  Tenderness:  toenail problem    Arch:  Normal  Claw toe:  Second toe, third toe, fourth toe and fifth toe     Left Foot Musculoskeletal   Ecchymosis:  none  Tenderness:  toenail problem  Arch:  Normal  Claw toe:  Second toe, third toe, fourth toe and fifth toe    MUSCLE  STRENGTH     Right Foot Muscle Strength   Foot dorsiflexion:  5  Foot plantar flexion:  5  Foot inversion:  5  Foot eversion:  5     Left Foot Muscle Strength   Foot dorsiflexion:  5  Foot plantar flexion:  5  Foot inversion:  5  Foot eversion:  5    RANGE OF MOTION     Right Foot Range of Motion   Foot and ankle ROM within normal limits       Left Foot Range of Motion   Foot and ankle ROM within normal limits      DERMATOLOGIC      Right Foot Dermatologic   Skin  Right foot skin is intact.   Nails  1.  Positive for elongated, onychomycosis, abnormal thickness, subungual debris and dystrophic nail.  2.  Positive for elongated, onychomycosis, abnormal thickness and subungual debris.  3.  Positive for elongated, onychomycosis, abnormal thickness and subungual debris.  4.  Positive for elongated, onychomycosis, abnormal thickness and subungual debris.  5.  Positive for elongated, onychomycosis, abnormal thickness and subungual debris.     Left Foot Dermatologic   Skin  Left foot skin is intact.   Nails  1.  Positive for elongated, onychomycosis, abnormal thickness, subungual debris and dystrophic nail.  2.  Positive for elongated, onychomycosis, abnormal thickness and subungual debris.  3.  Positive for elongated, onychomycosis, abnormal thickness and subungual debris.  4.  Positive for elongated, onychomycosis, abnormally thick and subungual debris.  5.  Positive for elongated, onychomycosis, abnormally thick and subungual debris.    Image:       RADIOLOGY/NUCLEAR:  No results found.    LABORATORY/CULTURE RESULTS:      PATHOLOGY RESULTS:       [id number]/PLAN     Diagnoses and all orders for this visit:    1. Onychomycosis (Primary)    2. Type 2 diabetes mellitus with diabetic neuropathy, without long-term current use of insulin    3. Foot callus [L84]    4. Claw toe    5. Stage 3b chronic kidney disease        Comprehensive lower extremity examination and evaluation was performed.  Discussed findings and treatment  plan including risks, benefits, and treatment options with patient in detail. Patient agreed with treatment plan.  After verbal consent obtained, nail(s) x10 debrided of length and thickness with nail nipper without incidence  After verbal consent obtained, calluses x1 pared utilizing dermal curette and/or scalpel without incidence  Patient may maintain nails and calluses at home utilizing emery board or pumice stone between visits as needed  Reviewed at home diabetic foot care including daily foot checks   Continue BG control with assistance of PCP.  Patient is at an increased risk of foot complications due to chronic kidney disease.    Continue to follow up with PCP for treatment of CKD and routine labs.    Due to diabetic neuropathy and CKD it is recommended that the patient return for routine nail and foot care.   Encourage patient to call with any questions or concerns before next appointment.  An After Visit Summary was printed and given to the patient at discharge, including (if requested) any available informative/educational handouts regarding diagnosis, treatment, or medications. All questions were answered to patient/family satisfaction. Should symptoms fail to improve or worsen they agree to call or return to clinic or to go to the Emergency Department. Discussed the importance of following up with any needed screening tests/labs/specialist appointments and any requested follow-up recommended by me today. Importance of maintaining follow-up discussed and patient accepts that missed appointments can delay diagnosis and potentially lead to worsening of conditions.  Return in about 3 months (around 8/28/2025) for Diabetic foot care clinic, With Bety CARBALLO., or sooner if acute issues arise.        This document has been electronically signed by KAIT Gee on May 28, 2025 09:04 CDT

## 2025-05-28 ENCOUNTER — OFFICE VISIT (OUTPATIENT)
Age: 80
End: 2025-05-28
Payer: MEDICARE

## 2025-05-28 VITALS
HEIGHT: 74 IN | HEART RATE: 64 BPM | OXYGEN SATURATION: 99 % | BODY MASS INDEX: 28.88 KG/M2 | SYSTOLIC BLOOD PRESSURE: 116 MMHG | DIASTOLIC BLOOD PRESSURE: 70 MMHG | WEIGHT: 225 LBS

## 2025-05-28 DIAGNOSIS — N18.32 STAGE 3B CHRONIC KIDNEY DISEASE: ICD-10-CM

## 2025-05-28 DIAGNOSIS — Q66.89 CLAW TOE: ICD-10-CM

## 2025-05-28 DIAGNOSIS — E11.40 TYPE 2 DIABETES MELLITUS WITH DIABETIC NEUROPATHY, WITHOUT LONG-TERM CURRENT USE OF INSULIN: ICD-10-CM

## 2025-05-28 DIAGNOSIS — L84 FOOT CALLUS: ICD-10-CM

## 2025-05-28 DIAGNOSIS — B35.1 ONYCHOMYCOSIS: Primary | ICD-10-CM

## 2025-06-23 ENCOUNTER — OFFICE VISIT (OUTPATIENT)
Dept: CARDIOLOGY | Facility: CLINIC | Age: 80
End: 2025-06-23
Payer: MEDICARE

## 2025-06-23 VITALS — HEIGHT: 74 IN | BODY MASS INDEX: 28.88 KG/M2

## 2025-06-23 NOTE — PROGRESS NOTES
Reason for Visit: cardiovascular follow up.    HPI:  Jero Higgins is a 79 y.o. male is here today for 6-month follow-up.  He follows in cardiology clinic due to paroxysmal atrial fibrillation and has a pacemaker in place.    Previous Cardiac Testing and Procedures:  -Pacemaker placement (2012) for sick sinus syndrome  -[facility] (12/6/2012) 55% ramus, 30% RCA, otherwise luminal irregularities  -[facility] (7/16/2018) nonobstructive coronary atherosclerosis  -Pacemaker generator change (3/25/2021) [employer]  -Dobutamine stress echo (2/28/2023) no clinical, electrocardiographic, or echocardiographic evidence of ischemia  -Echo (2/28/2023) EF 55-60%, impaired relaxation, mild RV enlargement, normal LA and RA, mild TR  -PFTs (12/2023) moderate obstructive ventilatory defect, slight decrease in inspiratory capacity, mild diffusion impairment that is normal when corrected for alveolar volume  -Chest x-ray (4/13/2024) no consolidation, effusion, or pneumothorax, cardiomediastinal silhouette and pulmonary vessels are within normal limits, pacemaker in place  -Nuclear stress (5/30/2024) fixed basal to mid inferior defect consistent with diaphragmatic attenuation, no evidence of ischemia, EF 43%  -Echo (5/30/2024) EF 55-60%, normal diastolic function for age, normal RV size and function, pacemaker lead present, normal LA size, normal aortic root, no significant valve dysfunction     Lab data:  -BMP (3/20/2024) creatinine 1.49, GFR 47, potassium 4.3, sodium 140  -CBC (3/20/2024) WBC 8.9, hemoglobin 12.5, platelets 260  -proBNP (4/13/2024) 935, normal 0-450  -Lipid panel (10/30/2024) total cholesterol 167, HDL 38, , triglycerides 129  -BMP (10/30/2024) creatinine 1.47, GFR 48, potassium 4.5, sodium 140    Patient Active Problem List   Diagnosis    Alternating constipation and diarrhea    Benign essential hypertension    Bilateral impacted cerumen    Cholelithiasis without obstruction    Nonobstructive atherosclerosis of coronary  artery    Gastroesophageal reflux disease without esophagitis    Personal history of kidney cancer    Hyperglycemia due to type 2 diabetes mellitus    Meniere's disease of both ears    Stage 3b chronic kidney disease    Sick sinus syndrome    Renal cell carcinoma    Pacemaker    Vertigo    Paroxysmal atrial fibrillation    Chronic anticoagulation    Gastroesophageal reflux disease       Social History     Tobacco Use    Smoking status: Former     Current packs/day: 0.00     Types: Cigarettes     Quit date: 2020     Years since quittin.0     Passive exposure: Never    Smokeless tobacco: Never   Vaping Use    Vaping status: Never Used   Substance Use Topics    Alcohol use: Not Currently    Drug use: Never       Family History   Problem Relation Age of Onset    Heart disease Mother     Cancer Mother     Hypertension Father     Diabetes Father     Cancer Brother     Colon cancer Neg Hx     Colon polyps Neg Hx     Esophageal cancer Neg Hx        The following portions of the patient's history were reviewed and updated as appropriate: allergies, current medications, past family history, past medical history, past social history, past surgical history, and problem list.      Current Outpatient Medications:     amLODIPine (NORVASC) 5 MG tablet, Take 1 tablet by mouth Daily., Disp: , Rfl:     Eliquis 5 MG tablet tablet, Take 1 tablet by mouth Every 12 (Twelve) Hours., Disp: , Rfl:     famotidine (PEPCID) 20 MG tablet, Take 1 tablet by mouth 2 (Two) Times a Day. (Patient taking differently: Take 1 tablet by mouth Daily.), Disp: , Rfl:     Farxiga 10 MG tablet, , Disp: , Rfl:     fluticasone-salmeterol (ADVAIR HFA) 45-21 MCG/ACT inhaler, Inhale 2 puffs 2 (Two) Times a Day., Disp: , Rfl:     losartan (COZAAR) 50 MG tablet, Take 2 tablets by mouth Daily., Disp: , Rfl:     metoprolol tartrate (LOPRESSOR) 25 MG tablet, Take 1 tablet by mouth 2 (Two) Times a Day., Disp: , Rfl:     propafenone (RYTHMOL) 225 MG tablet, Take  1 tablet by mouth Every 8 (Eight) Hours., Disp: , Rfl:     Semaglutide, 1 MG/DOSE, (OZEMPIC) 4 MG/3ML solution pen-injector, Inject  under the skin into the appropriate area as directed 1 (One) Time Per Week. (Patient not taking: Reported on 5/28/2025), Disp: , Rfl:     ROS    Objective   There were no vitals taken for this visit.  Physical Exam  Procedures    No diagnosis found.      Assessment/Plan:  1.  Paroxysmal atrial fibrillation: Seen on prior pacemaker interrogations.  He remains asymptomatic.  Continue metoprolol, propafenone, and Eliquis.       2.  Cardiac pacemaker: Interrogation on 4/24/2025 demonstrated normal function.       3.  Nonobstructive coronary artery disease: Noted on previous heart cath from 2012 and 2018.  Low risk stress echo on 2/28/2023 and nuclear stress on 5/30/2024 was negative for ischemia.  No current chest pain symptoms.  Plan to discuss statin therapy at follow-up.     4.  Essential hypertension: Blood pressure is low normal today and well-controlled at home.  Continue amlodipine, losartan, and metoprolol.     6.  Chronic anticoagulation: With Eliquis.

## 2025-07-21 ENCOUNTER — OFFICE VISIT (OUTPATIENT)
Dept: CARDIOLOGY | Facility: CLINIC | Age: 80
End: 2025-07-21
Payer: MEDICARE

## 2025-07-21 VITALS
BODY MASS INDEX: 29.65 KG/M2 | HEIGHT: 74 IN | OXYGEN SATURATION: 98 % | HEART RATE: 65 BPM | DIASTOLIC BLOOD PRESSURE: 82 MMHG | SYSTOLIC BLOOD PRESSURE: 120 MMHG | WEIGHT: 231 LBS

## 2025-07-21 DIAGNOSIS — Z95.0 PACEMAKER: ICD-10-CM

## 2025-07-21 DIAGNOSIS — I25.10 NONOBSTRUCTIVE ATHEROSCLEROSIS OF CORONARY ARTERY: ICD-10-CM

## 2025-07-21 DIAGNOSIS — I48.0 PAROXYSMAL ATRIAL FIBRILLATION: Primary | ICD-10-CM

## 2025-07-21 DIAGNOSIS — Z79.01 CHRONIC ANTICOAGULATION: ICD-10-CM

## 2025-07-21 DIAGNOSIS — I10 BENIGN ESSENTIAL HYPERTENSION: ICD-10-CM

## 2025-07-21 PROBLEM — E11.9 DM (DIABETES MELLITUS), TYPE 2: Status: ACTIVE | Noted: 2025-07-21

## 2025-07-21 PROBLEM — E11.65 HYPERGLYCEMIA DUE TO TYPE 2 DIABETES MELLITUS: Status: RESOLVED | Noted: 2023-07-19 | Resolved: 2025-07-21

## 2025-07-21 PROBLEM — K21.9 GASTROESOPHAGEAL REFLUX DISEASE: Status: RESOLVED | Noted: 2025-03-19 | Resolved: 2025-07-21

## 2025-07-21 PROCEDURE — 1160F RVW MEDS BY RX/DR IN RCRD: CPT | Performed by: INTERNAL MEDICINE

## 2025-07-21 PROCEDURE — 93000 ELECTROCARDIOGRAM COMPLETE: CPT | Performed by: INTERNAL MEDICINE

## 2025-07-21 PROCEDURE — 3074F SYST BP LT 130 MM HG: CPT | Performed by: INTERNAL MEDICINE

## 2025-07-21 PROCEDURE — 3079F DIAST BP 80-89 MM HG: CPT | Performed by: INTERNAL MEDICINE

## 2025-07-21 PROCEDURE — 99214 OFFICE O/P EST MOD 30 MIN: CPT | Performed by: INTERNAL MEDICINE

## 2025-07-21 PROCEDURE — 1159F MED LIST DOCD IN RCRD: CPT | Performed by: INTERNAL MEDICINE

## 2025-07-21 NOTE — PROGRESS NOTES
Reason for Visit: cardiovascular follow up.    HPI:  Jero Higgins is a 79 y.o. male is here today for 6-month follow-up.  He is doing well today and not having any issues or complaints.  He denies any chest pain, palpitations, syncope, PND, or orthopnea.  He gets a little dizzy when he stands up, but it goes away quickly.  He stays hydrated.  Blood pressure is normal at home and well controlled today.  He hasn't been getting much exercise due to the heat.  His weight is up 6 lbs compared to April.  He is on Farxiga now in place of Ozempic for diabetes.     Previous Cardiac Testing and Procedures:  -Pacemaker placement (2012) for sick sinus syndrome  -[facility] (12/6/2012) 55% ramus, 30% RCA, otherwise luminal irregularities  -[facility] (7/16/2018) nonobstructive coronary atherosclerosis  -Pacemaker generator change (3/25/2021) [employer]  -Dobutamine stress echo (2/28/2023) no clinical, electrocardiographic, or echocardiographic evidence of ischemia  -Echo (2/28/2023) EF 55-60%, impaired relaxation, mild RV enlargement, normal LA and RA, mild TR  -PFTs (12/2023) moderate obstructive ventilatory defect, slight decrease in inspiratory capacity, mild diffusion impairment that is normal when corrected for alveolar volume  -Chest x-ray (4/13/2024) no consolidation, effusion, or pneumothorax, cardiomediastinal silhouette and pulmonary vessels are within normal limits, pacemaker in place  -Nuclear stress (5/30/2024) fixed basal to mid inferior defect consistent with diaphragmatic attenuation, no evidence of ischemia, EF 43%  -Echo (5/30/2024) EF 55-60%, normal diastolic function for age, normal RV size and function, pacemaker lead present, normal LA size, normal aortic root, no significant valve dysfunction     Lab data:  -BMP (3/20/2024) creatinine 1.49, GFR 47, potassium 4.3, sodium 140  -CBC (3/20/2024) WBC 8.9, hemoglobin 12.5, platelets 260  -proBNP (4/13/2024) 935, normal 0-450  -Lipid panel (10/30/2024) total cholesterol 167,  HDL 38, , triglycerides 129  -BMP (10/30/2024) creatinine 1.47, GFR 48, potassium 4.5, sodium 140    Patient Active Problem List   Diagnosis    Alternating constipation and diarrhea    Benign essential hypertension    Bilateral impacted cerumen    Cholelithiasis without obstruction    Nonobstructive atherosclerosis of coronary artery    Gastroesophageal reflux disease without esophagitis    Personal history of kidney cancer    Meniere's disease of both ears    Stage 3b chronic kidney disease    Sick sinus syndrome    Renal cell carcinoma    Pacemaker    Vertigo    Paroxysmal atrial fibrillation    Chronic anticoagulation    DM (diabetes mellitus), type 2       Social History     Tobacco Use    Smoking status: Former     Current packs/day: 0.00     Types: Cigarettes     Quit date: 2020     Years since quittin.1     Passive exposure: Never    Smokeless tobacco: Never   Vaping Use    Vaping status: Never Used   Substance Use Topics    Alcohol use: Not Currently    Drug use: Never       Family History   Problem Relation Age of Onset    Heart disease Mother     Cancer Mother     Hypertension Father     Diabetes Father     Cancer Brother     Colon cancer Neg Hx     Colon polyps Neg Hx     Esophageal cancer Neg Hx        The following portions of the patient's history were reviewed and updated as appropriate: allergies, current medications, past family history, past medical history, past social history, past surgical history, and problem list.      Current Outpatient Medications:     amLODIPine (NORVASC) 5 MG tablet, Take 1 tablet by mouth Daily., Disp: , Rfl:     Eliquis 5 MG tablet tablet, Take 1 tablet by mouth Every 12 (Twelve) Hours., Disp: , Rfl:     Farxiga 10 MG tablet, , Disp: , Rfl:     fluticasone-salmeterol (ADVAIR HFA) 45-21 MCG/ACT inhaler, Inhale 2 puffs 2 (Two) Times a Day., Disp: , Rfl:     losartan (COZAAR) 50 MG tablet, Take 2 tablets by mouth Daily., Disp: , Rfl:     metoprolol tartrate  "(LOPRESSOR) 25 MG tablet, Take 1 tablet by mouth 2 (Two) Times a Day., Disp: , Rfl:     famotidine (PEPCID) 20 MG tablet, Take 1 tablet by mouth 2 (Two) Times a Day. (Patient not taking: Reported on 7/21/2025), Disp: , Rfl:     propafenone (RYTHMOL) 225 MG tablet, Take 1 tablet by mouth Every 8 (Eight) Hours., Disp: , Rfl:     Review of Systems   Constitutional: Negative for chills and fever.   Cardiovascular:  Negative for chest pain and paroxysmal nocturnal dyspnea.   Respiratory:  Negative for cough and shortness of breath.    Skin:  Negative for rash.   Gastrointestinal:  Negative for abdominal pain and heartburn.   Neurological:  Positive for dizziness. Negative for numbness.       Objective   /82 (BP Location: Left arm, Patient Position: Sitting, Cuff Size: Adult)   Pulse 65   Ht 188 cm (74.02\")   Wt 105 kg (231 lb)   SpO2 98%   BMI 29.65 kg/m²   Constitutional:       Appearance: Well-developed.   HENT:      Head: Normocephalic and atraumatic.   Pulmonary:      Effort: Pulmonary effort is normal.      Breath sounds: Normal breath sounds.   Cardiovascular:      Normal rate. Regular rhythm.   Edema:     Peripheral edema absent.   Skin:     General: Skin is warm and dry.   Neurological:      Mental Status: Alert and oriented to person, place, and time.         ECG 12 Lead    Date/Time: 7/21/2025 9:01 AM  Performed by: Mike Higgins MD    Authorized by: Mike Higgins MD  Comparison: compared with previous ECG from 12/16/2024  Similar to previous ECG  Rhythm comments: electronic atrial pacemaker  Rate: normal  Conduction: right bundle branch block            ICD-10-CM ICD-9-CM   1. Paroxysmal atrial fibrillation  I48.0 427.31   2. Pacemaker  Z95.0 V45.01   3. Nonobstructive atherosclerosis of coronary artery  I25.10 414.00   4. Benign essential hypertension  I10 401.1   5. Chronic anticoagulation  Z79.01 V58.61         Assessment/Plan:  1.  Paroxysmal atrial fibrillation: Diagnosed on pacemaker " interrogations.  No recent angina.  Continue metoprolol, propafenone, and Eliquis.      2.  Cardiac pacemaker: Interrogation on 4/24/2025 demonstrated normal function.     3.  Nonobstructive coronary artery disease: Noted on previous heart cath from 2012 and 2018.  No recent angina.  Low risk nuclear stress test on 5/30/2024.  Statin therapy is recommended.     4.  Essential hypertension: Blood pressure is well-controlled on losartan, amlodipine, and metoprolol.      5.  Chronic anticoagulation: Continue Eliquis.

## 2025-07-21 NOTE — LETTER
July 21, 2025     Colten Otto MD  [address] [facility] [place] [address]    Patient: Jero Higgins   YOB: 1945   Date of Visit: 7/21/2025       Dear Colten Otto MD    Jero Higgins was in my office today. Below is a copy of my note.    If you have questions, please do not hesitate to call me. I look forward to following Jero along with you.         Sincerely,        Mike Higgins MD        CC: No Recipients      Reason for Visit: cardiovascular follow up.    HPI:  Jero Higgins is a 79 y.o. male is here today for 6-month follow-up.  He is doing well today and not having any issues or complaints.  He denies any chest pain, palpitations, syncope, PND, or orthopnea.  He gets a little dizzy when he stands up, but it goes away quickly.  He stays hydrated.  Blood pressure is normal at home and well controlled today.  He hasn't been getting much exercise due to the heat.  His weight is up 6 lbs compared to April.  He is on Farxiga now in place of Ozempic for diabetes.     Previous Cardiac Testing and Procedures:  -Pacemaker placement (2012) for sick sinus syndrome  -[facility] (12/6/2012) 55% ramus, 30% RCA, otherwise luminal irregularities  -[facility] (7/16/2018) nonobstructive coronary atherosclerosis  -Pacemaker generator change (3/25/2021) [employer]  -Dobutamine stress echo (2/28/2023) no clinical, electrocardiographic, or echocardiographic evidence of ischemia  -Echo (2/28/2023) EF 55-60%, impaired relaxation, mild RV enlargement, normal LA and RA, mild TR  -PFTs (12/2023) moderate obstructive ventilatory defect, slight decrease in inspiratory capacity, mild diffusion impairment that is normal when corrected for alveolar volume  -Chest x-ray (4/13/2024) no consolidation, effusion, or pneumothorax, cardiomediastinal silhouette and pulmonary vessels are within normal limits, pacemaker in place  -Nuclear stress (5/30/2024) fixed basal to mid inferior defect consistent with diaphragmatic attenuation, no  evidence of ischemia, EF 43%  -Echo (2024) EF 55-60%, normal diastolic function for age, normal RV size and function, pacemaker lead present, normal LA size, normal aortic root, no significant valve dysfunction     Lab data:  -BMP (3/20/2024) creatinine 1.49, GFR 47, potassium 4.3, sodium 140  -CBC (3/20/2024) WBC 8.9, hemoglobin 12.5, platelets 260  -proBNP (2024) 935, normal 0-450  -Lipid panel (10/30/2024) total cholesterol 167, HDL 38, , triglycerides 129  -BMP (10/30/2024) creatinine 1.47, GFR 48, potassium 4.5, sodium 140    Patient Active Problem List   Diagnosis   • Alternating constipation and diarrhea   • Benign essential hypertension   • Bilateral impacted cerumen   • Cholelithiasis without obstruction   • Nonobstructive atherosclerosis of coronary artery   • Gastroesophageal reflux disease without esophagitis   • Personal history of kidney cancer   • Meniere's disease of both ears   • Stage 3b chronic kidney disease   • Sick sinus syndrome   • Renal cell carcinoma   • Pacemaker   • Vertigo   • Paroxysmal atrial fibrillation   • Chronic anticoagulation   • DM (diabetes mellitus), type 2       Social History     Tobacco Use   • Smoking status: Former     Current packs/day: 0.00     Types: Cigarettes     Quit date: 2020     Years since quittin.1     Passive exposure: Never   • Smokeless tobacco: Never   Vaping Use   • Vaping status: Never Used   Substance Use Topics   • Alcohol use: Not Currently   • Drug use: Never       Family History   Problem Relation Age of Onset   • Heart disease Mother    • Cancer Mother    • Hypertension Father    • Diabetes Father    • Cancer Brother    • Colon cancer Neg Hx    • Colon polyps Neg Hx    • Esophageal cancer Neg Hx        The following portions of the patient's history were reviewed and updated as appropriate: allergies, current medications, past family history, past medical history, past social history, past surgical history, and problem  "list.      Current Outpatient Medications:   •  amLODIPine (NORVASC) 5 MG tablet, Take 1 tablet by mouth Daily., Disp: , Rfl:   •  Eliquis 5 MG tablet tablet, Take 1 tablet by mouth Every 12 (Twelve) Hours., Disp: , Rfl:   •  Farxiga 10 MG tablet, , Disp: , Rfl:   •  fluticasone-salmeterol (ADVAIR HFA) 45-21 MCG/ACT inhaler, Inhale 2 puffs 2 (Two) Times a Day., Disp: , Rfl:   •  losartan (COZAAR) 50 MG tablet, Take 2 tablets by mouth Daily., Disp: , Rfl:   •  metoprolol tartrate (LOPRESSOR) 25 MG tablet, Take 1 tablet by mouth 2 (Two) Times a Day., Disp: , Rfl:   •  famotidine (PEPCID) 20 MG tablet, Take 1 tablet by mouth 2 (Two) Times a Day. (Patient not taking: Reported on 7/21/2025), Disp: , Rfl:   •  propafenone (RYTHMOL) 225 MG tablet, Take 1 tablet by mouth Every 8 (Eight) Hours., Disp: , Rfl:     Review of Systems   Constitutional: Negative for chills and fever.   Cardiovascular:  Negative for chest pain and paroxysmal nocturnal dyspnea.   Respiratory:  Negative for cough and shortness of breath.    Skin:  Negative for rash.   Gastrointestinal:  Negative for abdominal pain and heartburn.   Neurological:  Positive for dizziness. Negative for numbness.       Objective  /82 (BP Location: Left arm, Patient Position: Sitting, Cuff Size: Adult)   Pulse 65   Ht 188 cm (74.02\")   Wt 105 kg (231 lb)   SpO2 98%   BMI 29.65 kg/m²   Constitutional:       Appearance: Well-developed.   HENT:      Head: Normocephalic and atraumatic.   Pulmonary:      Effort: Pulmonary effort is normal.      Breath sounds: Normal breath sounds.   Cardiovascular:      Normal rate. Regular rhythm.   Edema:     Peripheral edema absent.   Skin:     General: Skin is warm and dry.   Neurological:      Mental Status: Alert and oriented to person, place, and time.         ECG 12 Lead    Date/Time: 7/21/2025 9:01 AM  Performed by: Mike Higgins MD    Authorized by: Mike Higgins MD  Comparison: compared with previous ECG from " 12/16/2024  Similar to previous ECG  Rhythm comments: electronic atrial pacemaker  Rate: normal  Conduction: right bundle branch block            ICD-10-CM ICD-9-CM   1. Paroxysmal atrial fibrillation  I48.0 427.31   2. Pacemaker  Z95.0 V45.01   3. Nonobstructive atherosclerosis of coronary artery  I25.10 414.00   4. Benign essential hypertension  I10 401.1   5. Chronic anticoagulation  Z79.01 V58.61         Assessment/Plan:  1.  Paroxysmal atrial fibrillation: Diagnosed on pacemaker interrogations.  No recent angina.  Continue metoprolol, propafenone, and Eliquis.      2.  Cardiac pacemaker: Interrogation on 4/24/2025 demonstrated normal function.     3.  Nonobstructive coronary artery disease: Noted on previous heart cath from 2012 and 2018.  No recent angina.  Low risk nuclear stress test on 5/30/2024.  Statin therapy is recommended.     4.  Essential hypertension: Blood pressure is well-controlled on losartan, amlodipine, and metoprolol.      5.  Chronic anticoagulation: Continue Eliquis.

## 2025-07-23 LAB
MC_CV_MDC_IDC_RATE_1: 150
MC_CV_MDC_IDC_RATE_1: 171
MC_CV_MDC_IDC_THERAPIES: NORMAL
MC_CV_MDC_IDC_ZONE_ID: 2
MC_CV_MDC_IDC_ZONE_ID: 6
MDC_IDC_MSMT_BATTERY_REMAINING_LONGEVITY: 98 MO
MDC_IDC_MSMT_BATTERY_RRT_TRIGGER: 2.62
MDC_IDC_MSMT_BATTERY_STATUS: NORMAL
MDC_IDC_MSMT_BATTERY_VOLTAGE: 2.99
MDC_IDC_MSMT_LEADCHNL_RA_DTM: NORMAL
MDC_IDC_MSMT_LEADCHNL_RA_IMPEDANCE_VALUE: 361
MDC_IDC_MSMT_LEADCHNL_RA_PACING_THRESHOLD_AMPLITUDE: 1
MDC_IDC_MSMT_LEADCHNL_RA_PACING_THRESHOLD_POLARITY: NORMAL
MDC_IDC_MSMT_LEADCHNL_RA_PACING_THRESHOLD_PULSEWIDTH: 0.4
MDC_IDC_MSMT_LEADCHNL_RA_SENSING_INTR_AMPL: 0.88
MDC_IDC_MSMT_LEADCHNL_RV_DTM: NORMAL
MDC_IDC_MSMT_LEADCHNL_RV_IMPEDANCE_VALUE: 608
MDC_IDC_MSMT_LEADCHNL_RV_PACING_THRESHOLD_AMPLITUDE: 0.62
MDC_IDC_MSMT_LEADCHNL_RV_PACING_THRESHOLD_POLARITY: NORMAL
MDC_IDC_MSMT_LEADCHNL_RV_PACING_THRESHOLD_PULSEWIDTH: 0.4
MDC_IDC_MSMT_LEADCHNL_RV_SENSING_INTR_AMPL: 11.12
MDC_IDC_PG_IMPLANT_DTM: NORMAL
MDC_IDC_PG_MFG: NORMAL
MDC_IDC_PG_MODEL: NORMAL
MDC_IDC_PG_SERIAL: NORMAL
MDC_IDC_PG_TYPE: NORMAL
MDC_IDC_SESS_DTM: NORMAL
MDC_IDC_SESS_TYPE: NORMAL
MDC_IDC_SET_BRADY_AT_MODE_SWITCH_RATE: 171
MDC_IDC_SET_BRADY_LOWRATE: 60
MDC_IDC_SET_BRADY_MAX_SENSOR_RATE: 130
MDC_IDC_SET_BRADY_MAX_TRACKING_RATE: 130
MDC_IDC_SET_BRADY_MODE: NORMAL
MDC_IDC_SET_BRADY_PAV_DELAY: 180
MDC_IDC_SET_BRADY_SAV_DELAY: 150
MDC_IDC_SET_LEADCHNL_RA_PACING_AMPLITUDE: 2
MDC_IDC_SET_LEADCHNL_RA_PACING_POLARITY: NORMAL
MDC_IDC_SET_LEADCHNL_RA_PACING_PULSEWIDTH: 0.4
MDC_IDC_SET_LEADCHNL_RA_SENSING_POLARITY: NORMAL
MDC_IDC_SET_LEADCHNL_RA_SENSING_SENSITIVITY: 0.45
MDC_IDC_SET_LEADCHNL_RV_PACING_AMPLITUDE: 2
MDC_IDC_SET_LEADCHNL_RV_PACING_POLARITY: NORMAL
MDC_IDC_SET_LEADCHNL_RV_PACING_PULSEWIDTH: 0.4
MDC_IDC_SET_LEADCHNL_RV_SENSING_POLARITY: NORMAL
MDC_IDC_SET_LEADCHNL_RV_SENSING_SENSITIVITY: 0.9
MDC_IDC_SET_ZONE_STATUS: NORMAL
MDC_IDC_SET_ZONE_STATUS: NORMAL
MDC_IDC_SET_ZONE_TYPE: NORMAL
MDC_IDC_SET_ZONE_TYPE: NORMAL
MDC_IDC_STAT_AT_BURDEN_PERCENT: 0.2
MDC_IDC_STAT_BRADY_RA_PERCENT_PACED: 99.53
MDC_IDC_STAT_BRADY_RV_PERCENT_PACED: 9.47

## 2025-07-30 ENCOUNTER — TRANSCRIBE ORDERS (OUTPATIENT)
Dept: PHYSICAL THERAPY | Facility: HOSPITAL | Age: 80
End: 2025-07-30
Payer: MEDICARE

## 2025-07-30 DIAGNOSIS — R41.3 OTHER AMNESIA: ICD-10-CM

## 2025-07-30 DIAGNOSIS — R41.3 SHORT-TERM MEMORY LOSS: Primary | ICD-10-CM

## 2025-08-11 ENCOUNTER — HOSPITAL ENCOUNTER (OUTPATIENT)
Facility: HOSPITAL | Age: 80
Setting detail: THERAPIES SERIES
Discharge: HOME | End: 2025-08-11
Payer: MEDICARE

## 2025-08-11 DIAGNOSIS — R41.89 OTHER SYMPTOMS AND SIGNS INVOLVING COGNITIVE FUNCTIONS AND AWARENESS: Primary | ICD-10-CM

## 2025-08-11 PROCEDURE — 96125 COGNITIVE TEST BY HC PRO: CPT

## 2025-08-28 ENCOUNTER — OFFICE VISIT (OUTPATIENT)
Age: 80
End: 2025-08-28
Payer: MEDICARE

## 2025-08-28 VITALS
SYSTOLIC BLOOD PRESSURE: 128 MMHG | DIASTOLIC BLOOD PRESSURE: 70 MMHG | OXYGEN SATURATION: 95 % | HEIGHT: 74 IN | BODY MASS INDEX: 29.52 KG/M2 | HEART RATE: 74 BPM | WEIGHT: 230 LBS

## 2025-08-28 DIAGNOSIS — L84 FOOT CALLUS: ICD-10-CM

## 2025-08-28 DIAGNOSIS — B35.1 ONYCHOMYCOSIS: Primary | ICD-10-CM

## 2025-08-28 DIAGNOSIS — E11.40 TYPE 2 DIABETES MELLITUS WITH DIABETIC NEUROPATHY, WITHOUT LONG-TERM CURRENT USE OF INSULIN: ICD-10-CM

## 2025-08-28 DIAGNOSIS — Q66.89 CLAW TOE: ICD-10-CM

## 2025-08-28 DIAGNOSIS — E11.9 ENCOUNTER FOR DIABETIC FOOT EXAM: ICD-10-CM

## 2025-08-28 DIAGNOSIS — N18.32 STAGE 3B CHRONIC KIDNEY DISEASE: ICD-10-CM

## 2025-08-28 LAB
MC_CV_MDC_IDC_RATE_1: 150
MC_CV_MDC_IDC_RATE_1: 171
MC_CV_MDC_IDC_THERAPIES: NORMAL
MC_CV_MDC_IDC_ZONE_ID: 2
MC_CV_MDC_IDC_ZONE_ID: 6
MDC_IDC_MSMT_BATTERY_REMAINING_LONGEVITY: 98 MO
MDC_IDC_MSMT_BATTERY_RRT_TRIGGER: 2.62
MDC_IDC_MSMT_BATTERY_STATUS: NORMAL
MDC_IDC_MSMT_BATTERY_VOLTAGE: 2.99
MDC_IDC_MSMT_LEADCHNL_RA_DTM: NORMAL
MDC_IDC_MSMT_LEADCHNL_RA_IMPEDANCE_VALUE: 361
MDC_IDC_MSMT_LEADCHNL_RA_PACING_THRESHOLD_AMPLITUDE: 1
MDC_IDC_MSMT_LEADCHNL_RA_PACING_THRESHOLD_POLARITY: NORMAL
MDC_IDC_MSMT_LEADCHNL_RA_PACING_THRESHOLD_PULSEWIDTH: 0.4
MDC_IDC_MSMT_LEADCHNL_RA_SENSING_INTR_AMPL: 0.88
MDC_IDC_MSMT_LEADCHNL_RV_DTM: NORMAL
MDC_IDC_MSMT_LEADCHNL_RV_IMPEDANCE_VALUE: 608
MDC_IDC_MSMT_LEADCHNL_RV_PACING_THRESHOLD_AMPLITUDE: 0.62
MDC_IDC_MSMT_LEADCHNL_RV_PACING_THRESHOLD_POLARITY: NORMAL
MDC_IDC_MSMT_LEADCHNL_RV_PACING_THRESHOLD_PULSEWIDTH: 0.4
MDC_IDC_MSMT_LEADCHNL_RV_SENSING_INTR_AMPL: 11.12
MDC_IDC_PG_IMPLANT_DTM: NORMAL
MDC_IDC_PG_MFG: NORMAL
MDC_IDC_PG_MODEL: NORMAL
MDC_IDC_PG_SERIAL: NORMAL
MDC_IDC_PG_TYPE: NORMAL
MDC_IDC_SESS_DTM: NORMAL
MDC_IDC_SESS_TYPE: NORMAL
MDC_IDC_SET_BRADY_AT_MODE_SWITCH_RATE: 171
MDC_IDC_SET_BRADY_LOWRATE: 60
MDC_IDC_SET_BRADY_MAX_SENSOR_RATE: 130
MDC_IDC_SET_BRADY_MAX_TRACKING_RATE: 130
MDC_IDC_SET_BRADY_MODE: NORMAL
MDC_IDC_SET_BRADY_PAV_DELAY: 180
MDC_IDC_SET_BRADY_SAV_DELAY: 150
MDC_IDC_SET_LEADCHNL_RA_PACING_AMPLITUDE: 2
MDC_IDC_SET_LEADCHNL_RA_PACING_POLARITY: NORMAL
MDC_IDC_SET_LEADCHNL_RA_PACING_PULSEWIDTH: 0.4
MDC_IDC_SET_LEADCHNL_RA_SENSING_POLARITY: NORMAL
MDC_IDC_SET_LEADCHNL_RA_SENSING_SENSITIVITY: 0.45
MDC_IDC_SET_LEADCHNL_RV_PACING_AMPLITUDE: 2
MDC_IDC_SET_LEADCHNL_RV_PACING_POLARITY: NORMAL
MDC_IDC_SET_LEADCHNL_RV_PACING_PULSEWIDTH: 0.4
MDC_IDC_SET_LEADCHNL_RV_SENSING_POLARITY: NORMAL
MDC_IDC_SET_LEADCHNL_RV_SENSING_SENSITIVITY: 0.9
MDC_IDC_SET_ZONE_STATUS: NORMAL
MDC_IDC_SET_ZONE_STATUS: NORMAL
MDC_IDC_SET_ZONE_TYPE: NORMAL
MDC_IDC_SET_ZONE_TYPE: NORMAL
MDC_IDC_STAT_AT_BURDEN_PERCENT: 0.2
MDC_IDC_STAT_BRADY_RA_PERCENT_PACED: 99.53
MDC_IDC_STAT_BRADY_RV_PERCENT_PACED: 9.47

## (undated) DEVICE — ENDO KIT,LOURDES HOSPITAL: Brand: MEDLINE INDUSTRIES, INC.

## (undated) DEVICE — CUFF,BP,DISP,1 TUBE,ADULT,HP: Brand: MEDLINE

## (undated) DEVICE — SENSR O2 OXIMAX FNGR A/ 18IN NONSTR

## (undated) DEVICE — THE CHANNEL CLEANING BRUSH IS A NYLON FLEXI BRUSH ATTACHED TO A FLEXIBLE PLASTIC SHEATH DESIGNED TO SAFELY REMOVE DEBRIS FROM FLEXIBLE ENDOSCOPES.

## (undated) DEVICE — CONMED SCOPE SAVER BITE BLOCK, 20X27 MM: Brand: SCOPE SAVER

## (undated) DEVICE — FRCP BX RADJAW4 NDL 2.8 240 STD OG

## (undated) DEVICE — THE SINGLE USE ETRAP – POLYP TRAP IS USED FOR SUCTION RETRIEVAL OF ENDOSCOPICALLY REMOVED POLYPS.: Brand: ETRAP

## (undated) DEVICE — SNARE ENDOSCP L240CM LOOP W13MM SHTH DIA2.4MM SM OVL FLX

## (undated) DEVICE — DEFENDO AIR WATER SUCTION AND BIOPSY VALVE KIT FOR  OLYMPUS: Brand: DEFENDO AIR/WATER/SUCTION AND BIOPSY VALVE

## (undated) DEVICE — SNAR POLYP CAPTIVATOR MICROHEX 13 240CM

## (undated) DEVICE — ARGYLE YANKAUER BULB TIP WITH VENT: Brand: ARGYLE